# Patient Record
Sex: FEMALE | Race: WHITE | Employment: OTHER | URBAN - METROPOLITAN AREA
[De-identification: names, ages, dates, MRNs, and addresses within clinical notes are randomized per-mention and may not be internally consistent; named-entity substitution may affect disease eponyms.]

---

## 2017-02-17 ENCOUNTER — OFFICE VISIT (OUTPATIENT)
Dept: FAMILY MEDICINE CLINIC | Age: 82
End: 2017-02-17

## 2017-02-17 VITALS
WEIGHT: 123 LBS | HEART RATE: 80 BPM | BODY MASS INDEX: 22.63 KG/M2 | RESPIRATION RATE: 20 BRPM | DIASTOLIC BLOOD PRESSURE: 78 MMHG | TEMPERATURE: 98.2 F | OXYGEN SATURATION: 97 % | SYSTOLIC BLOOD PRESSURE: 148 MMHG | HEIGHT: 62 IN

## 2017-02-17 DIAGNOSIS — R31.9 URINARY TRACT INFECTION WITH HEMATURIA, SITE UNSPECIFIED: Primary | ICD-10-CM

## 2017-02-17 DIAGNOSIS — N39.0 URINARY TRACT INFECTION WITH HEMATURIA, SITE UNSPECIFIED: Primary | ICD-10-CM

## 2017-02-17 LAB
BILIRUB UR QL STRIP: NEGATIVE
GLUCOSE UR-MCNC: NEGATIVE MG/DL
KETONES P FAST UR STRIP-MCNC: NEGATIVE MG/DL
PH UR STRIP: 5.5 [PH] (ref 4.6–8)
PROT UR QL STRIP: NORMAL MG/DL
SP GR UR STRIP: 1.01 (ref 1–1.03)
UA UROBILINOGEN AMB POC: NORMAL (ref 0.2–1)
URINALYSIS CLARITY POC: CLEAR
URINALYSIS COLOR POC: YELLOW
URINE BLOOD POC: NORMAL
URINE LEUKOCYTES POC: NORMAL
URINE NITRITES POC: POSITIVE

## 2017-02-17 RX ORDER — NITROFURANTOIN 25; 75 MG/1; MG/1
100 CAPSULE ORAL 2 TIMES DAILY
Qty: 14 CAP | Refills: 0 | Status: SHIPPED | OUTPATIENT
Start: 2017-02-17 | End: 2017-02-24

## 2017-02-17 NOTE — PATIENT INSTRUCTIONS
Urinary Tract Infection in Women: Care Instructions  Your Care Instructions    A urinary tract infection, or UTI, is a general term for an infection anywhere between the kidneys and the urethra (where urine comes out). Most UTIs are bladder infections. They often cause pain or burning when you urinate. UTIs are caused by bacteria and can be cured with antibiotics. Be sure to complete your treatment so that the infection goes away. Follow-up care is a key part of your treatment and safety. Be sure to make and go to all appointments, and call your doctor if you are having problems. It's also a good idea to know your test results and keep a list of the medicines you take. How can you care for yourself at home? · Take your antibiotics as directed. Do not stop taking them just because you feel better. You need to take the full course of antibiotics. · Drink extra water and other fluids for the next day or two. This may help wash out the bacteria that are causing the infection. (If you have kidney, heart, or liver disease and have to limit fluids, talk with your doctor before you increase your fluid intake.)  · Avoid drinks that are carbonated or have caffeine. They can irritate the bladder. · Urinate often. Try to empty your bladder each time. · To relieve pain, take a hot bath or lay a heating pad set on low over your lower belly or genital area. Never go to sleep with a heating pad in place. To prevent UTIs  · Drink plenty of water each day. This helps you urinate often, which clears bacteria from your system. (If you have kidney, heart, or liver disease and have to limit fluids, talk with your doctor before you increase your fluid intake.)  · Consider adding cranberry juice to your diet. · Urinate when you need to. · Urinate right after you have sex. · Change sanitary pads often. · Avoid douches, bubble baths, feminine hygiene sprays, and other feminine hygiene products that have deodorants.   · After going to the bathroom, wipe from front to back. When should you call for help? Call your doctor now or seek immediate medical care if:  · Symptoms such as fever, chills, nausea, or vomiting get worse or appear for the first time. · You have new pain in your back just below your rib cage. This is called flank pain. · There is new blood or pus in your urine. · You have any problems with your antibiotic medicine. Watch closely for changes in your health, and be sure to contact your doctor if:  · You are not getting better after taking an antibiotic for 2 days. · Your symptoms go away but then come back. Where can you learn more? Go to http://yeny-bret.info/. Enter U386 in the search box to learn more about \"Urinary Tract Infection in Women: Care Instructions. \"  Current as of: August 12, 2016  Content Version: 11.1  © 5002-0541 [employer]. Care instructions adapted under license by [employer] (which disclaims liability or warranty for this information). If you have questions about a medical condition or this instruction, always ask your healthcare professional. [address] any warranty or liability for your use of this information.

## 2017-02-17 NOTE — MR AVS SNAPSHOT
Visit Information Date & Time Provider Department Dept. Phone Encounter #  
 2/17/2017  9:30 AM Christopher Pham NP [facility] [phone] [id number] Follow-up Instructions Return if symptoms worsen or fail to improve. Upcoming Health Maintenance Date Due  
 MEDICARE YEARLY EXAM 7/3/1989 GLAUCOMA SCREENING Q2Y 5/31/2017* Pneumococcal 65+ Low/Medium Risk (2 of 2 - PPSV23) 10/4/2017 DTaP/Tdap/Td series (2 - Td) 10/4/2026 *Topic was postponed. The date shown is not the original due date. Allergies as of 2/17/2017  Review Complete On: 2/17/2017 By: Sabas Chavez LPN No Known Allergies Current Immunizations  Reviewed on 10/4/2016 Name Date Influenza High Dose Vaccine PF 10/4/2016, 10/14/2015 Influenza Vaccine 9/12/2014, 9/12/2013 Influenza Vaccine Split 10/15/2011, 10/12/2010 Not reviewed this visit You Were Diagnosed With   
  
 Codes Comments Urinary tract infection with hematuria, site unspecified    -  Primary ICD-10-CM: N39.0, R31.9 ICD-9-CM: 599.0 Bladder infection     ICD-10-CM: N30.90 ICD-9-CM: 595.9 Vitals BP Pulse Temp Resp Height(growth percentile) Weight(growth percentile) 148/78 80 98.2 °F (36.8 °C) (Oral) 20 5' 2\" (1.575 m) 123 lb (55.8 kg) SpO2 BMI OB Status Smoking Status 97% 22.5 kg/m2 Hysterectomy Former Smoker Vitals History BMI and BSA Data Body Mass Index Body Surface Area  
 22.5 kg/m 2 1.56 m 2 Preferred Pharmacy Pharmacy Name Phone [id number] [place] [phone] Your Updated Medication List  
  
   
This list is accurate as of: 2/17/17 10:19 AM.  Always use your most recent med list. amLODIPine 10 mg tablet Commonly known as:  [place] Post TAKE ONE TABLET BY MOUTH ONCE DAILY  
  
 aspirin delayed-release 81 mg tablet Take  by mouth daily. CALCARB 600 WITH VITAMIN D tablet Generic drug:  calcium-cholecalciferol (D3) Take 1 Tab by mouth daily. FISH OIL 1,000 mg Cap Generic drug:  omega-3 fatty acids-vitamin e Take 1 Cap by mouth. AKLRGJTV-RKNB-PVNIHI-HYALUR AC PO Take  by mouth. hydroCHLOROthiazide 12.5 mg tablet Commonly known as:  HYDRODIURIL Take 1 Tab by mouth daily. lisinopril 20 mg tablet Commonly known as:  PRINIVIL, ZESTRIL  
TAKE ONE TABLET BY MOUTH ONCE DAILY  
  
 nitrofurantoin (macrocrystal-monohydrate) 100 mg capsule Commonly known as:  MACROBID Take 1 Cap by mouth two (2) times a day for 7 days. OCUVITE WITH LUTEIN tablet Generic drug:  vit a,c & e-lutein-minerals  
daily. polyethylene glycol 17 gram/dose powder Commonly known as:  [place] [employer] DILUTE ONE CAPFULL IN WATER AS DIRECTED DAILY  
  
 simvastatin 20 mg tablet Commonly known as:  ZOCOR  
TAKE ONE TABLET BY MOUTH IN THE EVENING  
  
 TYLENOL ARTHRITIS 650 mg CR tablet Generic drug:  acetaminophen Take 1,300 mg by mouth every twelve (12) hours as needed. Prescriptions Sent to Pharmacy Refills  
 nitrofurantoin, macrocrystal-monohydrate, (MACROBID) 100 mg capsule 0 Sig: Take 1 Cap by mouth two (2) times a day for 7 days. Class: Normal  
 Pharmacy: [address] #: [phone] Route: Oral  
  
We Performed the Following AMB POC URINALYSIS DIP STICK AUTO W/O MICRO [15307 CPT(R)] CULTURE, URINE [id number] CPT(R)] Follow-up Instructions Return if symptoms worsen or fail to improve. Patient Instructions Urinary Tract Infection in Women: Care Instructions Your Care Instructions A urinary tract infection, or UTI, is a general term for an infection anywhere between the kidneys and the urethra (where urine comes out). Most UTIs are bladder infections. They often cause pain or burning when you urinate. UTIs are caused by bacteria and can be cured with antibiotics. Be sure to complete your treatment so that the infection goes away. Follow-up care is a key part of your treatment and safety. Be sure to make and go to all appointments, and call your doctor if you are having problems. It's also a good idea to know your test results and keep a list of the medicines you take. How can you care for yourself at home? · Take your antibiotics as directed. Do not stop taking them just because you feel better. You need to take the full course of antibiotics. · Drink extra water and other fluids for the next day or two. This may help wash out the bacteria that are causing the infection. (If you have kidney, heart, or liver disease and have to limit fluids, talk with your doctor before you increase your fluid intake.) · Avoid drinks that are carbonated or have caffeine. They can irritate the bladder. · Urinate often. Try to empty your bladder each time. · To relieve pain, take a hot bath or lay a heating pad set on low over your lower belly or genital area. Never go to sleep with a heating pad in place. To prevent UTIs · Drink plenty of water each day. This helps you urinate often, which clears bacteria from your system. (If you have kidney, heart, or liver disease and have to limit fluids, talk with your doctor before you increase your fluid intake.) · Consider adding cranberry juice to your diet. · Urinate when you need to. · Urinate right after you have sex. · Change sanitary pads often. · Avoid douches, bubble baths, feminine hygiene sprays, and other feminine hygiene products that have deodorants. · After going to the bathroom, wipe from front to back. When should you call for help? Call your doctor now or seek immediate medical care if: · Symptoms such as fever, chills, nausea, or vomiting get worse or appear for the first time. · You have new pain in your back just below your rib cage.  This is called flank pain. · There is new blood or pus in your urine. · You have any problems with your antibiotic medicine. Watch closely for changes in your health, and be sure to contact your doctor if: 
· You are not getting better after taking an antibiotic for 2 days. · Your symptoms go away but then come back. Where can you learn more? Go to http://yeny-bret.info/. Enter O984 in the search box to learn more about \"Urinary Tract Infection in Women: Care Instructions. \" Current as of: August 12, 2016 Content Version: 11.1 © 2499-0255 [employer]. Care instructions adapted under license by [employer] (which disclaims liability or warranty for this information). If you have questions about a medical condition or this instruction, always ask your healthcare professional. [address] any warranty or liability for your use of this information. Introducing [facility]! Michelle Martinez introduces [employer] patient portal. Now you can access parts of your medical record, email your doctor's office, and request medication refills online. 1. In your internet browser, go to [email or web address] 2. Click on the First Time User? Click Here link in the Sign In box. You will see the New Member Sign Up page. 3. Enter your [employer] Access Code exactly as it appears below. You will not need to use this code after youve completed the sign-up process. If you do not sign up before the expiration date, you must request a new code. · [employer] Access Code: [email or web address] Expires: 5/18/2017 10:19 AM 
 
4. Enter the last four digits of your Social Security Number (xxxx) and Date of Birth (mm/dd/yyyy) as indicated and click Submit. You will be taken to the next sign-up page. 5. Create a [employer] ID. This will be your [employer] login ID and cannot be changed, so think of one that is secure and easy to remember. 6. Create a [facility] [employer] password. You can change your password at any time. 7. Enter your Password Reset Question and Answer. This can be used at a later time if you forget your password. 8. Enter your e-mail address. You will receive e-mail notification when new information is available in [address] 9. Click Sign Up. You can now view and download portions of your medical record. 10. Click the Download Summary menu link to download a portable copy of your medical information. If you have questions, please visit the Frequently Asked Questions section of the [employer] website. Remember, [employer] is NOT to be used for urgent needs. For medical emergencies, dial 911. Now available from your iPhone and Android! Please provide this summary of care documentation to your next provider. Your primary care clinician is listed as Emily Yang. If you have any questions after today's visit, please call [phone].

## 2017-02-17 NOTE — PROGRESS NOTES
HISTORY OF PRESENT ILLNESS  Philip Garcia is a 80 y.o. female. HPI Patient comes in today for UTI. States symptoms started last night. Complains of dysuria, frequency, urgency, abdominal pain. Denies flank pain, fever, chills. Drinks water, coffee. Wipes front to back. No bubble baths, no douching  No Known Allergies    Past Medical History   Diagnosis Date    Disc disease, degenerative, lumbar or lumbosacral     HTN (hypertension) 4/9/2010    Hypercholesteremia 4/9/2010    Osteoporosis, age related        Past Surgical History   Procedure Laterality Date    Pr abdomen surgery proc unlisted      Hx gyn      Hx cataract removal      Endoscopy, colon, diagnostic         Social History     Social History    Marital status:      Spouse name: N/A    Number of children: N/A    Years of education: N/A     Occupational History    Not on file. Social History Main Topics    Smoking status: Former Smoker     Quit date: 5/3/1986    Smokeless tobacco: Never Used    Alcohol use Yes      Comment: wine occas    Drug use: No    Sexual activity: Not Currently     Other Topics Concern    Not on file     Social History Narrative       Family History   Problem Relation Age of Onset    Heart Disease Mother     Cancer Father      colon    No Known Problems Sister     No Known Problems Brother        Current Outpatient Prescriptions   Medication Sig    simvastatin (ZOCOR) 20 mg tablet TAKE ONE TABLET BY MOUTH IN THE EVENING    hydrochlorothiazide (HYDRODIURIL) 12.5 mg tablet Take 1 Tab by mouth daily.  lisinopril (PRINIVIL, ZESTRIL) 20 mg tablet TAKE ONE TABLET BY MOUTH ONCE DAILY    amLODIPine (NORVASC) 10 mg tablet TAKE ONE TABLET BY MOUTH ONCE DAILY    polyethylene glycol (MIRALAX) 17 gram/dose powder DILUTE ONE CAPFULL IN WATER AS DIRECTED DAILY    acetaminophen (TYLENOL ARTHRITIS) 650 mg CR tablet Take 1,300 mg by mouth every twelve (12) hours as needed.     GLUC HCL/CSA/NGOZI HY/HYALUR AC (FZVFZGOS-NSXL-GKKTTE-HYALUR AC PO) Take  by mouth.  calcium-cholecalciferol, D3, (CALCARB 600 WITH VITAMIN D) tablet Take 1 Tab by mouth daily.  vit a,c & e-lutein-minerals (OCUVITE) tablet daily.  aspirin delayed-release 81 mg tablet Take  by mouth daily.  omega-3 fatty acids-vitamin e (FISH OIL) 1,000 mg Cap Take 1 Cap by mouth. No current facility-administered medications for this visit. Review of Systems   Constitutional: Negative for chills and fever. Gastrointestinal: Positive for abdominal pain. Negative for nausea and vomiting. Genitourinary: Positive for dysuria, frequency and urgency. Negative for flank pain and hematuria. Neurological: Negative for dizziness. Vitals:    02/17/17 0922 02/17/17 1016   BP: 189/89 148/78   Pulse: 80    Resp: 20    Temp: 98.2 °F (36.8 °C)    TempSrc: Oral    SpO2: 97%    Weight: 123 lb (55.8 kg)    Height: 5' 2\" (1.575 m)      Physical Exam   Constitutional: She is oriented to person, place, and time. Vital signs are normal. She appears well-developed and well-nourished. She is cooperative. Cardiovascular: Normal rate, regular rhythm and normal heart sounds. Pulmonary/Chest: Effort normal and breath sounds normal.   Abdominal: Soft. Normal appearance and bowel sounds are normal. There is tenderness in the suprapubic area. There is no CVA tenderness. Neurological: She is alert and oriented to person, place, and time. Skin: Skin is warm and dry. Psychiatric: She has a normal mood and affect. Her speech is normal and behavior is normal. Thought content normal.     ASSESSMENT and PLAN    ICD-10-CM ICD-9-CM    1. Urinary tract infection with hematuria, site unspecified N39.0 599.0 AMB POC URINALYSIS DIP STICK AUTO W/O MICRO    R31.9  nitrofurantoin, macrocrystal-monohydrate, (MACROBID) 100 mg capsule      CULTURE, URINE     Encounter Diagnoses   Name Primary?     Urinary tract infection with hematuria, site unspecified Yes Orders Placed This Encounter    CULTURE, URINE    AMB POC URINALYSIS DIP STICK AUTO W/O MICRO    nitrofurantoin, macrocrystal-monohydrate, (MACROBID) 100 mg capsule     Tommy was seen today for bladder infection. Diagnoses and all orders for this visit:    Urinary tract infection with hematuria, site unspecified  -     AMB POC URINALYSIS DIP STICK AUTO W/O MICRO - +nitrites, 3+ LE, 2+ blood  -     nitrofurantoin, macrocrystal-monohydrate, (MACROBID) 100 mg capsule; Take 1 Cap by mouth two (2) times a day for 7 days. -     CULTURE, URINE  -     Provided and reviewed written patient education on UTI and prevention of UTI  -     Push fluids, cranberry juice. Follow-up Disposition:  Return if symptoms worsen or fail to improve.  lab results and schedule of future lab studies reviewed with patient    I have reviewed the patient's allergies and made any necessary changes. Medical, procedural, social and family histories have been reviewed and updated as medically indicated. I have reconciled and/or revised patient medications in the EMR. I have discussed each diagnosis listed in this note with Drew Julien and/or their family. I have discussed treatment options and the risk/benefit analysis of those options, including safe use of medications and possible medication side effects. Through the use of shared decision making we have agreed to the above plan. The patient has received an after-visit summary and questions were answered concerning future plans. Olivia Brooks, AFSANEH    This note will not be viewable in [email or web address].

## 2017-02-20 LAB — BACTERIA UR CULT: ABNORMAL

## 2017-02-22 ENCOUNTER — TELEPHONE (OUTPATIENT)
Dept: FAMILY MEDICINE CLINIC | Age: 82
End: 2017-02-22

## 2017-02-25 ENCOUNTER — HOSPITAL ENCOUNTER (OUTPATIENT)
Dept: LAB | Age: 82
Discharge: HOME | End: 2017-02-25

## 2017-02-25 ENCOUNTER — HOSPITAL ENCOUNTER (EMERGENCY)
Age: 82
Discharge: HOME | End: 2017-02-25
Attending: EMERGENCY MEDICINE

## 2017-02-25 VITALS
RESPIRATION RATE: 16 BRPM | WEIGHT: 120 LBS | SYSTOLIC BLOOD PRESSURE: 191 MMHG | OXYGEN SATURATION: 98 % | BODY MASS INDEX: 21.26 KG/M2 | DIASTOLIC BLOOD PRESSURE: 86 MMHG | HEART RATE: 83 BPM | HEIGHT: 63 IN | TEMPERATURE: 97.7 F

## 2017-02-25 DIAGNOSIS — N30.00 ACUTE CYSTITIS WITHOUT HEMATURIA: Primary | ICD-10-CM

## 2017-02-25 LAB
BILIRUB UR QL: NEGATIVE
GLUCOSE UR QL STRIP.AUTO: NEGATIVE MG/DL
KETONES UR-MCNC: NEGATIVE MG/DL
LEUKOCYTE ESTERASE UR QL STRIP: ABNORMAL
NITRITE UR QL: NEGATIVE
PH UR: 8.5 [PH] (ref 5–8)
PROT UR QL: NEGATIVE MG/DL
RBC # UR STRIP: ABNORMAL /UL
SP GR UR: 1.01 (ref 1–1.03)
UROBILINOGEN UR QL: 0.2 EU/DL (ref 0.2–1)

## 2017-02-25 PROCEDURE — 87086 URINE CULTURE/COLONY COUNT: CPT | Performed by: EMERGENCY MEDICINE

## 2017-02-25 PROCEDURE — 87077 CULTURE AEROBIC IDENTIFY: CPT | Performed by: EMERGENCY MEDICINE

## 2017-02-25 PROCEDURE — 87186 SC STD MICRODIL/AGAR DIL: CPT | Performed by: EMERGENCY MEDICINE

## 2017-02-25 RX ORDER — CEPHALEXIN 500 MG/1
500 CAPSULE ORAL 4 TIMES DAILY
Qty: 28 CAP | Refills: 0 | Status: SHIPPED | OUTPATIENT
Start: 2017-02-25 | End: 2017-03-04

## 2017-02-25 NOTE — UC PROVIDER NOTE
HPI Comments: Just completed treatment for uti, susceptible to macrobid, symptoms recurred the day after completing treatment    Patient is a 80 y.o. female presenting with urinary pain. The history is provided by the patient. Urinary Pain    This is a new problem. The problem occurs every urination. The problem has not changed since onset. The quality of the pain is described as burning. The pain is at a severity of 3/10. The pain is mild. There has been no fever. She is not sexually active. There is no history of pyelonephritis. Associated symptoms include frequency, urgency and abdominal pain. Pertinent negatives include no chills, no sweats, no nausea, no vomiting, no discharge, no hematuria, no hesitancy, no flank pain, no vaginal discharge and no back pain. The patient is not pregnant. She has tried antibiotics for the symptoms. The treatment provided significant (then symptoms recurred after completing 7 days of macrobid) relief. Her past medical history is significant for recurrent UTIs. Her past medical history does not include kidney stones, single kidney, urological procedure, urinary stasis, catheterization or urinary catheter problem. Past Medical History:   Diagnosis Date    Disc disease, degenerative, lumbar or lumbosacral     HTN (hypertension) 4/9/2010    Hypercholesteremia 4/9/2010    Osteoporosis, age related         Past Surgical History:   Procedure Laterality Date    ABDOMEN SURGERY PROC UNLISTED      ENDOSCOPY, COLON, DIAGNOSTIC      HX CATARACT REMOVAL      HX GYN           Family History   Problem Relation Age of Onset    Heart Disease Mother     Cancer Father      colon    No Known Problems Sister     No Known Problems Brother         Social History     Social History    Marital status:      Spouse name: N/A    Number of children: N/A    Years of education: N/A     Occupational History    Not on file.      Social History Main Topics    Smoking status: Former Smoker     Quit date: 5/3/1986    Smokeless tobacco: Never Used    Alcohol use Yes      Comment: wine occas    Drug use: No    Sexual activity: Not Currently     Other Topics Concern    Not on file     Social History Narrative                ALLERGIES: Review of patient's allergies indicates no known allergies. Review of Systems   Constitutional: Negative. Negative for chills. Gastrointestinal: Positive for abdominal pain. Negative for nausea and vomiting. Genitourinary: Positive for dysuria, frequency and urgency. Negative for flank pain, hematuria, hesitancy and vaginal discharge. Musculoskeletal: Negative. Negative for back pain. All other systems reviewed and are negative. Vitals:    02/25/17 1101   BP: 191/86   Pulse: 83   Resp: 16   Temp: 97.7 °F (36.5 °C)   SpO2: 98%   Weight: 54.4 kg (120 lb)   Height: 5' 3\" (1.6 m)       Physical Exam   Constitutional: She is oriented to person, place, and time. She appears well-developed and well-nourished. HENT:   Head: Normocephalic and atraumatic. Mouth/Throat: Oropharynx is clear and moist. No oropharyngeal exudate. Eyes: Conjunctivae and EOM are normal. Pupils are equal, round, and reactive to light. Right eye exhibits no discharge. Left eye exhibits no discharge. No scleral icterus. Neck: Normal range of motion. No tracheal deviation present. No thyromegaly present. Cardiovascular: Normal rate, regular rhythm and normal heart sounds. No murmur heard. Pulmonary/Chest: Effort normal and breath sounds normal. No respiratory distress. She has no wheezes. She has no rales. She exhibits no tenderness. Abdominal: Soft. Bowel sounds are normal. She exhibits no distension. There is no tenderness. There is no rebound and no guarding. Musculoskeletal: Normal range of motion. She exhibits no edema or tenderness. Lymphadenopathy:     She has no cervical adenopathy. Neurological: She is alert and oriented to person, place, and time.  No cranial nerve deficit. Coordination normal.   Skin: Skin is warm. No erythema. Psychiatric: She has a normal mood and affect. Her behavior is normal. Judgment and thought content normal.   Nursing note and vitals reviewed.       MDM     Differential Diagnosis; Clinical Impression; Plan:     Recurrent uti, will send culture, treat with keflex      Procedures

## 2017-02-25 NOTE — DISCHARGE INSTRUCTIONS
Urinary Tract Infection in Women: Care Instructions  Your Care Instructions    A urinary tract infection, or UTI, is a general term for an infection anywhere between the kidneys and the urethra (where urine comes out). Most UTIs are bladder infections. They often cause pain or burning when you urinate. UTIs are caused by bacteria and can be cured with antibiotics. Be sure to complete your treatment so that the infection goes away. Follow-up care is a key part of your treatment and safety. Be sure to make and go to all appointments, and call your doctor if you are having problems. It's also a good idea to know your test results and keep a list of the medicines you take. How can you care for yourself at home? · Take your antibiotics as directed. Do not stop taking them just because you feel better. You need to take the full course of antibiotics. · Drink extra water and other fluids for the next day or two. This may help wash out the bacteria that are causing the infection. (If you have kidney, heart, or liver disease and have to limit fluids, talk with your doctor before you increase your fluid intake.)  · Avoid drinks that are carbonated or have caffeine. They can irritate the bladder. · Urinate often. Try to empty your bladder each time. · To relieve pain, take a hot bath or lay a heating pad set on low over your lower belly or genital area. Never go to sleep with a heating pad in place. To prevent UTIs  · Drink plenty of water each day. This helps you urinate often, which clears bacteria from your system. (If you have kidney, heart, or liver disease and have to limit fluids, talk with your doctor before you increase your fluid intake.)  · Consider adding cranberry juice to your diet. · Urinate when you need to. · Urinate right after you have sex. · Change sanitary pads often. · Avoid douches, bubble baths, feminine hygiene sprays, and other feminine hygiene products that have deodorants.   · After going to the bathroom, wipe from front to back. When should you call for help? Call your doctor now or seek immediate medical care if:  · Symptoms such as fever, chills, nausea, or vomiting get worse or appear for the first time. · You have new pain in your back just below your rib cage. This is called flank pain. · There is new blood or pus in your urine. · You have any problems with your antibiotic medicine. Watch closely for changes in your health, and be sure to contact your doctor if:  · You are not getting better after taking an antibiotic for 2 days. · Your symptoms go away but then come back. Where can you learn more? Go to http://yeny-bret.info/. Enter G443 in the search box to learn more about \"Urinary Tract Infection in Women: Care Instructions. \"  Current as of: August 12, 2016  Content Version: 11.1  © 3063-4850 [employer]. Care instructions adapted under license by [employer] (which disclaims liability or warranty for this information). If you have questions about a medical condition or this instruction, always ask your healthcare professional. [address] any warranty or liability for your use of this information.

## 2017-02-27 LAB
BACTERIA SPEC CULT: ABNORMAL
CC UR VC: ABNORMAL
SERVICE CMNT-IMP: ABNORMAL

## 2017-03-01 ENCOUNTER — OFFICE VISIT (OUTPATIENT)
Dept: FAMILY MEDICINE CLINIC | Age: 82
End: 2017-03-01

## 2017-03-01 VITALS
BODY MASS INDEX: 21.26 KG/M2 | SYSTOLIC BLOOD PRESSURE: 164 MMHG | HEART RATE: 80 BPM | DIASTOLIC BLOOD PRESSURE: 61 MMHG | TEMPERATURE: 96.2 F | WEIGHT: 120 LBS | OXYGEN SATURATION: 98 % | HEIGHT: 63 IN | RESPIRATION RATE: 20 BRPM

## 2017-03-01 DIAGNOSIS — N39.0 URINARY TRACT INFECTION, SITE UNSPECIFIED: Primary | ICD-10-CM

## 2017-03-01 LAB
BILIRUB UR QL STRIP: NEGATIVE
GLUCOSE UR-MCNC: NEGATIVE MG/DL
KETONES P FAST UR STRIP-MCNC: NEGATIVE MG/DL
PH UR STRIP: 7.5 [PH] (ref 4.6–8)
PROT UR QL STRIP: NEGATIVE MG/DL
SP GR UR STRIP: 1.01 (ref 1–1.03)
UA UROBILINOGEN AMB POC: NORMAL (ref 0.2–1)
URINALYSIS CLARITY POC: CLEAR
URINALYSIS COLOR POC: YELLOW
URINE BLOOD POC: NORMAL
URINE LEUKOCYTES POC: NORMAL
URINE NITRITES POC: NEGATIVE

## 2017-03-01 NOTE — PATIENT INSTRUCTIONS
Urinary Tract Infection in Women: Care Instructions  Your Care Instructions    A urinary tract infection, or UTI, is a general term for an infection anywhere between the kidneys and the urethra (where urine comes out). Most UTIs are bladder infections. They often cause pain or burning when you urinate. UTIs are caused by bacteria and can be cured with antibiotics. Be sure to complete your treatment so that the infection goes away. Follow-up care is a key part of your treatment and safety. Be sure to make and go to all appointments, and call your doctor if you are having problems. It's also a good idea to know your test results and keep a list of the medicines you take. How can you care for yourself at home? · Take your antibiotics as directed. Do not stop taking them just because you feel better. You need to take the full course of antibiotics. · Drink extra water and other fluids for the next day or two. This may help wash out the bacteria that are causing the infection. (If you have kidney, heart, or liver disease and have to limit fluids, talk with your doctor before you increase your fluid intake.)  · Avoid drinks that are carbonated or have caffeine. They can irritate the bladder. · Urinate often. Try to empty your bladder each time. · To relieve pain, take a hot bath or lay a heating pad set on low over your lower belly or genital area. Never go to sleep with a heating pad in place. To prevent UTIs  · Drink plenty of water each day. This helps you urinate often, which clears bacteria from your system. (If you have kidney, heart, or liver disease and have to limit fluids, talk with your doctor before you increase your fluid intake.)  · Consider adding cranberry juice to your diet. · Urinate when you need to. · Urinate right after you have sex. · Change sanitary pads often. · Avoid douches, bubble baths, feminine hygiene sprays, and other feminine hygiene products that have deodorants.   · After going to the bathroom, wipe from front to back. When should you call for help? Call your doctor now or seek immediate medical care if:  · Symptoms such as fever, chills, nausea, or vomiting get worse or appear for the first time. · You have new pain in your back just below your rib cage. This is called flank pain. · There is new blood or pus in your urine. · You have any problems with your antibiotic medicine. Watch closely for changes in your health, and be sure to contact your doctor if:  · You are not getting better after taking an antibiotic for 2 days. · Your symptoms go away but then come back. Where can you learn more? Go to http://yeny-bret.info/. Enter R924 in the search box to learn more about \"Urinary Tract Infection in Women: Care Instructions. \"  Current as of: August 12, 2016  Content Version: 11.1  © 7262-1898 [employer]. Care instructions adapted under license by [employer] (which disclaims liability or warranty for this information). If you have questions about a medical condition or this instruction, always ask your healthcare professional. [address] any warranty or liability for your use of this information.

## 2017-03-01 NOTE — PROGRESS NOTES
HISTORY OF PRESENT ILLNESS  Abrahan Bone is a 80 y.o. female. HPI  Patient comes in today for follow up UTI. Patient was seen by me in office on 2/17/17, dx with UTI, urine culture positive for E coli, C&C shows susceptibility to Macrobid that was prescribed for patient. Patient states symptoms improved while taking antibiotic, but returned (dysuria, back pain) on 2/25/17 once she had completed antibiotic. Patient went to [address] [place] [address] on 2/25/17 - records reviewed. Urine was recultured and patient was started on Keflex. Patient is currently in middle of treatment with Keflex and states symptoms have once again resolved. Denies abd pain, fever, chills, AMS  No Known Allergies    Past Medical History:   Diagnosis Date    Disc disease, degenerative, lumbar or lumbosacral     HTN (hypertension) 4/9/2010    Hypercholesteremia 4/9/2010    Osteoporosis, age related        Past Surgical History:   Procedure Laterality Date    ABDOMEN SURGERY PROC UNLISTED      ENDOSCOPY, COLON, DIAGNOSTIC      HX CATARACT REMOVAL      HX GYN         Social History     Social History    Marital status:      Spouse name: N/A    Number of children: N/A    Years of education: N/A     Occupational History    Not on file. Social History Main Topics    Smoking status: Former Smoker     Quit date: 5/3/1986    Smokeless tobacco: Never Used    Alcohol use Yes      Comment: wine occas    Drug use: No    Sexual activity: Not Currently     Other Topics Concern    Not on file     Social History Narrative       Family History   Problem Relation Age of Onset    Heart Disease Mother     Cancer Father      colon    No Known Problems Sister     No Known Problems Brother        Current Outpatient Prescriptions   Medication Sig    cephALEXin (KEFLEX) 500 mg capsule Take 1 Cap by mouth four (4) times daily for 7 days.     simvastatin (ZOCOR) 20 mg tablet TAKE ONE TABLET BY MOUTH IN THE EVENING    hydrochlorothiazide (HYDRODIURIL) 12.5 mg tablet Take 1 Tab by mouth daily.  lisinopril (PRINIVIL, ZESTRIL) 20 mg tablet TAKE ONE TABLET BY MOUTH ONCE DAILY    amLODIPine (NORVASC) 10 mg tablet TAKE ONE TABLET BY MOUTH ONCE DAILY    polyethylene glycol (MIRALAX) 17 gram/dose powder DILUTE ONE CAPFULL IN WATER AS DIRECTED DAILY    acetaminophen (TYLENOL ARTHRITIS) 650 mg CR tablet Take 1,300 mg by mouth every twelve (12) hours as needed.  GLUC HCL/CSA/NGOZI HY/HYALUR AC (FZILQBXG-UMKE-LWMNTS-HYALUR AC PO) Take  by mouth.  calcium-cholecalciferol, D3, (CALCARB 600 WITH VITAMIN D) tablet Take 1 Tab by mouth daily.  vit a,c & e-lutein-minerals (OCUVITE) tablet daily.  aspirin delayed-release 81 mg tablet Take  by mouth daily.  omega-3 fatty acids-vitamin e (FISH OIL) 1,000 mg Cap Take 1 Cap by mouth. No current facility-administered medications for this visit. Review of Systems   Constitutional: Negative for chills and fever. Respiratory: Negative for shortness of breath. Cardiovascular: Negative for chest pain. Gastrointestinal: Negative for abdominal pain, nausea and vomiting. Genitourinary: Negative for dysuria, flank pain, frequency, hematuria and urgency. Neurological: Negative for dizziness. Vitals:    03/01/17 1456   BP: 164/61   Pulse: 80   Resp: 20   Temp: 96.2 °F (35.7 °C)   TempSrc: Oral   SpO2: 98%   Weight: 120 lb (54.4 kg)   Height: 5' 3\" (1.6 m)     Physical Exam   Constitutional: She is oriented to person, place, and time. She appears well-developed and well-nourished. She is cooperative. Cardiovascular: Normal rate. Pulmonary/Chest: Effort normal.   Abdominal: Soft. There is no tenderness. There is no CVA tenderness. Neurological: She is alert and oriented to person, place, and time. Skin: Skin is warm and dry. Psychiatric: She has a normal mood and affect. Her behavior is normal. Judgment and thought content normal.   Vitals reviewed.     ASSESSMENT and PLAN    ICD-10-CM ICD-9-CM    1. Urinary tract infection, site unspecified N39.0  AMB POC URINALYSIS DIP STICK AUTO W/ MICRO     Encounter Diagnoses   Name Primary?  Urinary tract infection, site unspecified Yes     Orders Placed This Encounter    AMB POC URINALYSIS DIP STICK AUTO W/ MICRO     Tommy was seen today for bladder infection. Diagnoses and all orders for this visit:    Urinary tract infection, site unspecified - improving. Patient encouraged to continue Keflex to completion, drink plenty of water, wipe front to back. Patient instructed to call office if symptoms return after completion of antibiotic. If UTI persists, will need referral to urology. -     AMB POC URINALYSIS DIP STICK AUTO W/ MICRO      Follow-up Disposition:  Return if symptoms worsen or fail to improve.  lab results and schedule of future lab studies reviewed with patient    I have reviewed the patient's allergies and made any necessary changes. Medical, procedural, social and family histories have been reviewed and updated as medically indicated. I have reconciled and/or revised patient medications in the EMR. I have discussed each diagnosis listed in this note with Deni Deutsch and/or their family. I have discussed treatment options and the risk/benefit analysis of those options, including safe use of medications and possible medication side effects. Through the use of shared decision making we have agreed to the above plan. The patient has received an after-visit summary and questions were answered concerning future plans. Olivia Brooks, AFSANEH    This note will not be viewable in [employer].

## 2017-03-01 NOTE — MR AVS SNAPSHOT
Visit Information Date & Time Provider Department Dept. Phone Encounter #  
 3/1/2017  2:30 PM Christopher Pham NP [facility] [phone] [id number] Follow-up Instructions Return if symptoms worsen or fail to improve. Your Appointments 3/22/2017  2:45 PM  
Medicare Physical with Emeli Queen MD  
[facility] Appt Note: Tamir Heard  [address] [id number] [phone] [address] [id number] Upcoming Health Maintenance Date Due  
 MEDICARE YEARLY EXAM 7/3/1989 GLAUCOMA SCREENING Q2Y 5/31/2017* Pneumococcal 65+ Low/Medium Risk (2 of 2 - PPSV23) 10/4/2017 DTaP/Tdap/Td series (2 - Td) 10/4/2026 *Topic was postponed. The date shown is not the original due date. Allergies as of 3/1/2017  Review Complete On: 2/25/2017 By: Jori Meyer RN No Known Allergies Current Immunizations  Reviewed on 10/4/2016 Name Date Influenza High Dose Vaccine PF 10/4/2016, 10/14/2015 Influenza Vaccine 9/12/2014, 9/12/2013 Influenza Vaccine Split 10/15/2011, 10/12/2010 Not reviewed this visit You Were Diagnosed With   
  
 Codes Comments Urinary tract infection, site unspecified    -  Primary ICD-10-CM: N39.0 Vitals BP  
  
  
  
  
  
 164/61 BMI and BSA Data Body Mass Index Body Surface Area  
 21.26 kg/m 2 1.56 m 2 Preferred Pharmacy Pharmacy Name Phone [facility] [address] [address] [phone] Your Updated Medication List  
  
   
This list is accurate as of: 3/1/17  3:30 PM.  Always use your most recent med list. amLODIPine 10 mg tablet Commonly known as:  Rudene Post TAKE ONE TABLET BY MOUTH ONCE DAILY  
  
 aspirin delayed-release 81 mg tablet Take  by mouth daily. CALCARB 600 WITH VITAMIN D tablet Generic drug:  calcium-cholecalciferol (D3) Take 1 Tab by mouth daily. cephALEXin 500 mg capsule Commonly known as:  Paris Revels Take 1 Cap by mouth four (4) times daily for 7 days. FISH OIL 1,000 mg Cap Generic drug:  omega-3 fatty acids-vitamin e Take 1 Cap by mouth. CZELUWXZ-GCLC-APYRQI-HYALUR AC PO Take  by mouth. hydroCHLOROthiazide 12.5 mg tablet Commonly known as:  HYDRODIURIL Take 1 Tab by mouth daily. lisinopril 20 mg tablet Commonly known as:  PRINIVIL, ZESTRIL  
TAKE ONE TABLET BY MOUTH ONCE DAILY  
  
 OCUVITE WITH LUTEIN tablet Generic drug:  vit a,c & e-lutein-minerals  
daily. polyethylene glycol 17 gram/dose powder Commonly known as:  Emmie Manan DILUTE ONE CAPFULL IN WATER AS DIRECTED DAILY  
  
 simvastatin 20 mg tablet Commonly known as:  ZOCOR  
TAKE ONE TABLET BY MOUTH IN THE EVENING  
  
 TYLENOL ARTHRITIS 650 mg CR tablet Generic drug:  acetaminophen Take 1,300 mg by mouth every twelve (12) hours as needed. We Performed the Following AMB POC URINALYSIS DIP STICK AUTO W/ MICRO [71048 CPT(R)] Follow-up Instructions Return if symptoms worsen or fail to improve. Patient Instructions Urinary Tract Infection in Women: Care Instructions Your Care Instructions A urinary tract infection, or UTI, is a general term for an infection anywhere between the kidneys and the urethra (where urine comes out). Most UTIs are bladder infections. They often cause pain or burning when you urinate. UTIs are caused by bacteria and can be cured with antibiotics. Be sure to complete your treatment so that the infection goes away. Follow-up care is a key part of your treatment and safety. Be sure to make and go to all appointments, and call your doctor if you are having problems. It's also a good idea to know your test results and keep a list of the medicines you take. How can you care for yourself at home? · Take your antibiotics as directed. Do not stop taking them just because you feel better. You need to take the full course of antibiotics. · Drink extra water and other fluids for the next day or two. This may help wash out the bacteria that are causing the infection. (If you have kidney, heart, or liver disease and have to limit fluids, talk with your doctor before you increase your fluid intake.) · Avoid drinks that are carbonated or have caffeine. They can irritate the bladder. · Urinate often. Try to empty your bladder each time. · To relieve pain, take a hot bath or lay a heating pad set on low over your lower belly or genital area. Never go to sleep with a heating pad in place. To prevent UTIs · Drink plenty of water each day. This helps you urinate often, which clears bacteria from your system. (If you have kidney, heart, or liver disease and have to limit fluids, talk with your doctor before you increase your fluid intake.) · Consider adding cranberry juice to your diet. · Urinate when you need to. · Urinate right after you have sex. · Change sanitary pads often. · Avoid douches, bubble baths, feminine hygiene sprays, and other feminine hygiene products that have deodorants. · After going to the bathroom, wipe from front to back. When should you call for help? Call your doctor now or seek immediate medical care if: · Symptoms such as fever, chills, nausea, or vomiting get worse or appear for the first time. · You have new pain in your back just below your rib cage. This is called flank pain. · There is new blood or pus in your urine. · You have any problems with your antibiotic medicine. Watch closely for changes in your health, and be sure to contact your doctor if: 
· You are not getting better after taking an antibiotic for 2 days. · Your symptoms go away but then come back. Where can you learn more? Go to http://yeny-bret.info/. Enter F446 in the search box to learn more about \"Urinary Tract Infection in Women: Care Instructions. \" Current as of: August 12, 2016 Content Version: 11.1 © 1716-7082 [employer]. Care instructions adapted under license by [employer] (which disclaims liability or warranty for this information). If you have questions about a medical condition or this instruction, always ask your healthcare professional. [address] any warranty or liability for your use of this information. Introducing [facility]! Nathanielsoumya Diamond introduces [employer] patient portal. Now you can access parts of your medical record, email your doctor's office, and request medication refills online. 1. In your internet browser, go to [email or web address] 2. Click on the First Time User? Click Here link in the Sign In box. You will see the New Member Sign Up page. 3. Enter your ChoreMonster Access Code exactly as it appears below. You will not need to use this code after youve completed the sign-up process. If you do not sign up before the expiration date, you must request a new code. · ChoreMonster Access Code: [email or web address] Expires: 5/18/2017 10:19 AM 
 
4. Enter the last four digits of your Social Security Number (xxxx) and Date of Birth (mm/dd/yyyy) as indicated and click Submit. You will be taken to the next sign-up page. 5. Create a ChoreMonster ID. This will be your ChoreMonster login ID and cannot be changed, so think of one that is secure and easy to remember. 6. Create a Ch[employer]Monster password. You can change your password at any time. 7. Enter your Password Reset Question and Answer. This can be used at a later time if you forget your password. 8. Enter your e-mail address. You will receive e-mail notification when new information is available in [address] 9. Click Sign Up. You can now view and download portions of your medical record. 10. Click the Download Summary menu link to download a portable copy of your medical information. If you have questions, please visit the Frequently Asked Questions section of the [employer] website. Remember, [employer] is NOT to be used for urgent needs. For medical emergencies, dial 911. Now available from your iPhone and Android! Please provide this summary of care documentation to your next provider. Your primary care clinician is listed as Shravan Wilkerson. If you have any questions after today's visit, please call [phone].

## 2017-06-29 ENCOUNTER — OFFICE VISIT (OUTPATIENT)
Dept: FAMILY MEDICINE CLINIC | Age: 82
End: 2017-06-29

## 2017-06-29 ENCOUNTER — PATIENT OUTREACH (OUTPATIENT)
Dept: FAMILY MEDICINE CLINIC | Age: 82
End: 2017-06-29

## 2017-06-29 VITALS
SYSTOLIC BLOOD PRESSURE: 148 MMHG | DIASTOLIC BLOOD PRESSURE: 70 MMHG | TEMPERATURE: 96.5 F | HEIGHT: 63 IN | BODY MASS INDEX: 21.09 KG/M2 | RESPIRATION RATE: 18 BRPM | HEART RATE: 72 BPM | OXYGEN SATURATION: 95 % | WEIGHT: 119 LBS

## 2017-06-29 DIAGNOSIS — Z00.00 ROUTINE GENERAL MEDICAL EXAMINATION AT A HEALTH CARE FACILITY: ICD-10-CM

## 2017-06-29 DIAGNOSIS — R82.90 ABNORMAL FINDING IN URINE: ICD-10-CM

## 2017-06-29 DIAGNOSIS — I10 ESSENTIAL HYPERTENSION: Primary | ICD-10-CM

## 2017-06-29 DIAGNOSIS — E78.5 HYPERLIPIDEMIA, UNSPECIFIED HYPERLIPIDEMIA TYPE: ICD-10-CM

## 2017-06-29 DIAGNOSIS — Z13.39 SCREENING FOR ALCOHOLISM: ICD-10-CM

## 2017-06-29 LAB
BILIRUB UR QL STRIP: NEGATIVE
GLUCOSE UR-MCNC: NEGATIVE MG/DL
KETONES P FAST UR STRIP-MCNC: NEGATIVE MG/DL
PH UR STRIP: 7 [PH] (ref 4.6–8)
PROT UR QL STRIP: NEGATIVE MG/DL
SP GR UR STRIP: 1.01 (ref 1–1.03)
UA UROBILINOGEN AMB POC: NORMAL (ref 0.2–1)
URINALYSIS CLARITY POC: NORMAL
URINALYSIS COLOR POC: YELLOW
URINE BLOOD POC: NORMAL
URINE LEUKOCYTES POC: NORMAL
URINE NITRITES POC: NEGATIVE

## 2017-06-29 RX ORDER — HYDROCHLOROTHIAZIDE 12.5 MG/1
TABLET ORAL
Qty: 90 TAB | Refills: 3 | Status: SHIPPED | OUTPATIENT
Start: 2017-06-29 | End: 2018-06-08 | Stop reason: SDUPTHER

## 2017-06-29 RX ORDER — AMLODIPINE BESYLATE 10 MG/1
TABLET ORAL
Qty: 90 TAB | Refills: 3 | Status: SHIPPED | OUTPATIENT
Start: 2017-06-29 | End: 2018-06-08 | Stop reason: SDUPTHER

## 2017-06-29 RX ORDER — SIMVASTATIN 20 MG/1
TABLET, FILM COATED ORAL
Qty: 90 TAB | Refills: 3 | Status: SHIPPED | OUTPATIENT
Start: 2017-06-29 | End: 2018-06-08 | Stop reason: SDUPTHER

## 2017-06-29 RX ORDER — LISINOPRIL 20 MG/1
TABLET ORAL
Qty: 90 TAB | Refills: 3 | Status: SHIPPED | OUTPATIENT
Start: 2017-06-29 | End: 2018-06-08 | Stop reason: SDUPTHER

## 2017-06-29 NOTE — MR AVS SNAPSHOT
Visit Information Date & Time Provider Department Dept. Phone Encounter #  
 6/29/2017  9:00 AM Greg Dahl [phone] [id number] Follow-up Instructions Return in about 6 months (around 12/29/2017). Upcoming Health Maintenance Date Due  
 GLAUCOMA SCREENING Q2Y 7/3/1989 MEDICARE YEARLY EXAM 7/3/1989 INFLUENZA AGE 9 TO ADULT 8/1/2017 DTaP/Tdap/Td series (2 - Td) 6/29/2027 Allergies as of 6/29/2017  Review Complete On: 6/29/2017 By: Odessa Merino LPN No Known Allergies Current Immunizations  Reviewed on 10/4/2016 Name Date Influenza High Dose Vaccine PF 10/4/2016, 10/14/2015 Influenza Vaccine 9/12/2014, 9/12/2013 Influenza Vaccine Split 10/15/2011, 10/12/2010 Not reviewed this visit You Were Diagnosed With   
  
 Codes Comments Hyperlipidemia, unspecified hyperlipidemia type    -  Primary ICD-10-CM: E78.5 ICD-9-CM: 272.4 Essential hypertension     ICD-10-CM: I10 
ICD-9-CM: 401.9 Routine general medical examination at a health care facility     ICD-10-CM: Z00.00 ICD-9-CM: V70.0 Screening for alcoholism     ICD-10-CM: Z13.89 ICD-9-CM: V79.1 Vitals Height(growth percentile) Weight(growth percentile) BMI OB Status Smoking Status 5' 3\" (1.6 m) 119 lb (54 kg) 21.08 kg/m2 Hysterectomy Former Smoker Vitals History BMI and BSA Data Body Mass Index Body Surface Area 21.08 kg/m 2 1.55 m 2 Preferred Pharmacy Pharmacy Name Phone [facility] [id number] [place] [phone] Your Updated Medication List  
  
   
This list is accurate as of: 6/29/17 10:06 AM.  Always use your most recent med list. amLODIPine 10 mg tablet Commonly known as:  [place] Staggers TAKE ONE TABLET BY MOUTH ONCE DAILY  
  
 aspirin delayed-release 81 mg tablet Take  by mouth daily. CALCARB 600 WITH VITAMIN D tablet Generic drug:  calcium-cholecalciferol (D3) Take 1 Tab by mouth daily. FISH OIL 1,000 mg Cap Generic drug:  omega-3 fatty acids-vitamin e Take 1 Cap by mouth. RCKRMKFC-TIEM-NZYIAU-HYALUR AC PO Take  by mouth. hydroCHLOROthiazide 12.5 mg tablet Commonly known as:  HYDRODIURIL  
TAKE ONE TABLET BY MOUTH ONCE DAILY  
  
 lisinopril 20 mg tablet Commonly known as:  PRINIVIL, ZESTRIL  
TAKE ONE TABLET BY MOUTH ONCE DAILY  
  
 OCUVITE WITH LUTEIN tablet Generic drug:  vit a,c & e-lutein-minerals  
daily. polyethylene glycol 17 gram/dose powder Commonly known as:  Gwenyth Space DILUTE ONE CAPFULL IN WATER AS DIRECTED DAILY  
  
 simvastatin 20 mg tablet Commonly known as:  ZOCOR  
TAKE ONE TABLET BY MOUTH IN THE EVENING  
  
 TYLENOL ARTHRITIS 650 mg CR tablet Generic drug:  acetaminophen Take 1,300 mg by mouth every twelve (12) hours as needed. We Performed the Following AMB POC URINALYSIS DIP STICK AUTO W/ MICRO [34454 CPT(R)] CBC WITH AUTOMATED DIFF [70281 CPT(R)] LIPID PANEL [81837 CPT(R)] METABOLIC PANEL, COMPREHENSIVE [26109 CPT(R)] Follow-up Instructions Return in about 6 months (around 12/29/2017). Patient Instructions Medicare Part B Preventive Services Limitations Recommendation Scheduled Bone Mass Measurement 
(age 72 & older, biennial) Requires diagnosis related to osteoporosis or estrogen deficiency. Biennial benefit unless patient has history of long-term glucocorticoid tx or baseline is needed because initial test was by other method  Declined today Cardiovascular Screening Blood Tests (every 5 years) Total cholesterol, HDL, Triglycerides Order as a panel if possible  Last done 10/2017 Colorectal Cancer Screening 
-Fecal occult blood test (annual) -Flexible sigmoidoscopy (5y) 
-Screening colonoscopy (10y) -Barium Enema   Had in past  
Counseling to Prevent Tobacco Use (up to 8 sessions per year) - Counseling greater than 3 and up to 10 minutes - Counseling greater than 10 minutes Patients must be asymptomatic of tobacco-related conditions to receive as preventive service  Non smoker Diabetes Screening Tests (at least every 3 years, Medicare covers annually or at 6-month intervals for prediabetic patients) Fasting blood sugar (FBS) or glucose tolerance test (GTT) Patient must be diagnosed with one of the following: 
-Hypertension, Dyslipidemia, obesity, previous impaired FBS or GTT 
Or any two of the following: overweight, FH of diabetes, age ? 72, history of gestational diabetes, birth of baby weighing more than 9 pounds  Last done 10/2016 blood glucose was 100 normal is  Diabetes Self-Management Training (DSMT) (no USPSTF recommendation) Requires referral by treating physician for patient with diabetes or renal disease. 10 hours of initial DSMT session of no less than 30 minutes each in a continuous 12-month period. 2 hours of follow-up DSMT in subsequent years. N/A Glaucoma Screening (no USPSTF recommendation) Diabetes mellitus, family history, , age 48 or over,  American, age 72 or over  Dr Mushtaq Johnson yearly Human Immunodeficiency Virus (HIV) Screening (annually for increased risk patients) HIV-1 and HIV-2 by EIA, FADIA, rapid antibody test, or oral mucosa transudate Patient must be at increased risk for HIV infection per USPSTF guidelines or pregnant. Tests covered annually for patients at increased risk. Pregnant patients may receive up to 3 test during pregnancy. Not high risk Medical Nutrition Therapy (MNT) (for diabetes or renal disease not recommended schedule) Requires referral by treating physician for patient with diabetes or renal disease. Can be provided in same year as diabetes self-management training (DSMT), and CMS recommends medical nutrition therapy take place after DSMT.   Up to 3 hours for initial year and 2 hours in subsequent years. N/A Shingles Vaccination A shingles vaccine is also recommended once in a lifetime after age 61  Declined today Seasonal Influenza Vaccination (annually)   Fall 2017 Pneumococcal Vaccination (once after 65)   Declined today Hepatitis B Vaccinations (if medium/high risk) Medium/high risk factors:  End-stage renal disease, Hemophiliacs who received Factor VIII or IX concentrates, Clients of institutions for the mentally retarded, Persons who live in the same house as a HepB virus carrier, Homosexual men, Illicit injectable drug abusers. N/A Screening Mammography (biennial age 54-69) Annually (age 36 or over)  Declined today Screening Pap Tests and Pelvic Examination (up to age 79 and after 79 if unknown history or abnormal study last 10 years) Every 24 months except high risk  Hysterectomy Ultrasound Screening for Abdominal Aortic Aneurysm (AAA) (once) Patient must be referred through IPPE and not have had a screening for abdominal aortic aneurysm before under Medicare. Limited to patients who meet one of the following criteria: 
- Men who are 73-68 years old and have smoked more than 100 cigarettes in their lifetime. 
-Anyone with a FH of AAA 
-Anyone recommended for screening by USPSTF  N/A Introducing [facility]! [employer] introduces [employer] patient portal. Now you can access parts of your medical record, email your doctor's office, and request medication refills online. 1. In your internet browser, go to [email or web address] 2. Click on the First Time User? Click Here link in the Sign In box. You will see the New Member Sign Up page. 3. Enter your [employer] Access Code exactly as it appears below. You will not need to use this code after youve completed the sign-up process. If you do not sign up before the expiration date, you must request a new code. · [employer] Access Code: [email or web address] Expires: 9/27/2017  9:39 AM 
 
 4. Enter the last four digits of your Social Security Number (xxxx) and Date of Birth (mm/dd/yyyy) as indicated and click Submit. You will be taken to the next sign-up page. 5. Create a [employer] ID. This will be your [employer] login ID and cannot be changed, so think of one that is secure and easy to remember. 6. Create a [employer] password. You can change your password at any time. 7. Enter your Password Reset Question and Answer. This can be used at a later time if you forget your password. 8. Enter your e-mail address. You will receive e-mail notification when new information is available in [address] 9. Click Sign Up. You can now view and download portions of your medical record. 10. Click the Download Summary menu link to download a portable copy of your medical information. If you have questions, please visit the Frequently Asked Questions section of the [employer] website. Remember, [employer] is NOT to be used for urgent needs. For medical emergencies, dial 911. Now available from your iPhone and Android! Please provide this summary of care documentation to your next provider. Your primary care clinician is listed as [facility]. If you have any questions after today's visit, please call [phone].

## 2017-06-29 NOTE — PROGRESS NOTES
HISTORY OF PRESENT ILLNESS  Floyd Whipple is a 80 y.o. female. HPI  Patient comes in today for follow up. Patient states compliance with BP medications. Denies any problems with medications. Patient states she took medications this morning. Denies chest pains, palpitations, dyspnea, dizziness, tingling in extremities, headache. Appetite good. Urinating without difficulty. Patient's daughter present today, states patient has been drinking cranberry juice and taking Azo, would like to have urine checked for UTI. Patient has been adamant about not seeing a urologist.  Denies fever, chills, abdominal pain. No Known Allergies    Past Medical History:   Diagnosis Date    Disc disease, degenerative, lumbar or lumbosacral     HTN (hypertension) 4/9/2010    Hypercholesteremia 4/9/2010    Osteoporosis, age related        Past Surgical History:   Procedure Laterality Date    ABDOMEN SURGERY PROC UNLISTED      ENDOSCOPY, COLON, DIAGNOSTIC      HX CATARACT REMOVAL      HX GYN         Social History     Social History    Marital status:      Spouse name: N/A    Number of children: N/A    Years of education: N/A     Occupational History    Not on file.      Social History Main Topics    Smoking status: Former Smoker     Quit date: 5/3/1986    Smokeless tobacco: Never Used    Alcohol use Yes      Comment: wine occas    Drug use: No    Sexual activity: Not Currently     Other Topics Concern    Not on file     Social History Narrative       Family History   Problem Relation Age of Onset    Heart Disease Mother     Cancer Father      colon    No Known Problems Sister     No Known Problems Brother        Current Outpatient Prescriptions   Medication Sig    lisinopril (PRINIVIL, ZESTRIL) 20 mg tablet TAKE ONE TABLET BY MOUTH ONCE DAILY    amLODIPine (NORVASC) 10 mg tablet TAKE ONE TABLET BY MOUTH ONCE DAILY    hydroCHLOROthiazide (HYDRODIURIL) 12.5 mg tablet TAKE ONE TABLET BY MOUTH ONCE DAILY    simvastatin (ZOCOR) 20 mg tablet TAKE ONE TABLET BY MOUTH IN THE EVENING    acetaminophen (TYLENOL ARTHRITIS) 650 mg CR tablet Take 1,300 mg by mouth every twelve (12) hours as needed.  GLUC HCL/CSA/NGOZI HY/HYALUR AC (TEJCGCHT-NSPY-CYYIVP-HYALUR AC PO) Take  by mouth.  calcium-cholecalciferol, D3, (CALCARB 600 WITH VITAMIN D) tablet Take 1 Tab by mouth daily.  vit a,c & e-lutein-minerals (OCUVITE) tablet daily.  aspirin delayed-release 81 mg tablet Take  by mouth daily.  omega-3 fatty acids-vitamin e (FISH OIL) 1,000 mg Cap Take 1 Cap by mouth.  polyethylene glycol (MIRALAX) 17 gram/dose powder DILUTE ONE CAPFULL IN WATER AS DIRECTED DAILY     No current facility-administered medications for this visit. Review of Systems   Constitutional: Negative for chills, fever, malaise/fatigue and weight loss. Respiratory: Negative for cough and shortness of breath. Cardiovascular: Negative for chest pain, palpitations and leg swelling. Gastrointestinal: Negative for abdominal pain, nausea and vomiting. Genitourinary: Negative for dysuria, flank pain, frequency, hematuria and urgency. Musculoskeletal: Negative for back pain, joint pain and myalgias. Skin: Negative. Neurological: Negative for dizziness, tingling, sensory change, speech change, focal weakness and headaches. Psychiatric/Behavioral: Negative for depression. The patient is not nervous/anxious and does not have insomnia. Vitals:    06/29/17 0914 06/29/17 1000   BP: 176/77 148/70   Pulse: 72    Resp: 18    Temp: 96.5 °F (35.8 °C)    TempSrc: Oral    SpO2: 95%    Weight: 119 lb (54 kg)    Height: 5' 3\" (1.6 m)      Physical Exam   Constitutional: She is oriented to person, place, and time. She appears well-developed and well-nourished. She is cooperative. Cardiovascular: Normal rate, regular rhythm and normal heart sounds. Pulses:       Radial pulses are 2+ on the right side, and 2+ on the left side. Dorsalis pedis pulses are 2+ on the right side, and 2+ on the left side. Pulmonary/Chest: Effort normal.   Abdominal: Soft. Normal appearance and bowel sounds are normal. There is no tenderness. Neurological: She is alert and oriented to person, place, and time. Skin: Skin is warm and dry. Psychiatric: She has a normal mood and affect. Her behavior is normal. Judgment and thought content normal.   Vitals reviewed. ASSESSMENT and PLAN    ICD-10-CM ICD-9-CM    1. Essential hypertension I10 401.9 hydroCHLOROthiazide (HYDRODIURIL) 12.5 mg tablet      amLODIPine (NORVASC) 10 mg tablet      lisinopril (PRINIVIL, ZESTRIL) 20 mg tablet      METABOLIC PANEL, COMPREHENSIVE      CBC WITH AUTOMATED DIFF      LIPID PANEL      AMB POC URINALYSIS DIP STICK AUTO W/ MICRO   2. Hyperlipidemia, unspecified hyperlipidemia type E78.5 272.4 simvastatin (ZOCOR) 20 mg tablet   3. Routine general medical examination at a health care facility Z00.00 V70.0    4. Screening for alcoholism Z13.89 V79.1    5. Abnormal finding in urine R82.90 791.9 CULTURE, URINE     Encounter Diagnoses   Name Primary?  Essential hypertension Yes    Hyperlipidemia, unspecified hyperlipidemia type     Routine general medical examination at a health care facility     Screening for alcoholism     Abnormal finding in urine      Orders Placed This Encounter    CULTURE, URINE    METABOLIC PANEL, COMPREHENSIVE    CBC WITH AUTOMATED DIFF    LIPID PANEL    AMB POC URINALYSIS DIP STICK AUTO W/ MICRO    hydroCHLOROthiazide (HYDRODIURIL) 12.5 mg tablet    amLODIPine (NORVASC) 10 mg tablet    lisinopril (PRINIVIL, ZESTRIL) 20 mg tablet    simvastatin (ZOCOR) 20 mg tablet     Tommy was seen today for physical, medication refill, labs and annual wellness visit. Diagnoses and all orders for this visit:    Essential hypertension - stable.   Continue current medications and check labs  -     hydroCHLOROthiazide (HYDRODIURIL) 12.5 mg tablet; TAKE ONE TABLET BY MOUTH ONCE DAILY  -     amLODIPine (NORVASC) 10 mg tablet; TAKE ONE TABLET BY MOUTH ONCE DAILY  -     lisinopril (PRINIVIL, ZESTRIL) 20 mg tablet; TAKE ONE TABLET BY MOUTH ONCE DAILY  -     METABOLIC PANEL, COMPREHENSIVE  -     CBC WITH AUTOMATED DIFF  -     LIPID PANEL  -     AMB POC URINALYSIS DIP STICK AUTO W/ MICRO    Hyperlipidemia, unspecified hyperlipidemia type  -     simvastatin (ZOCOR) 20 mg tablet; TAKE ONE TABLET BY MOUTH IN THE EVENING    Routine general medical examination at a health care facility - per nurse navigator. Screening for alcoholism    Abnormal finding in urine  -     CULTURE, URINE      Follow-up Disposition:  Return in about 6 months (around 12/29/2017). lab results and schedule of future lab studies reviewed with patient  reviewed diet, exercise and weight control  cardiovascular risk and specific lipid/LDL goals reviewed    I have reviewed the patient's allergies and made any necessary changes. Medical, procedural, social and family histories have been reviewed and updated as medically indicated. I have reconciled and/or revised patient medications in the EMR. I have discussed each diagnosis listed in this note with Tamiko Larkin and/or their family. I have discussed treatment options and the risk/benefit analysis of those options, including safe use of medications and possible medication side effects. Through the use of shared decision making we have agreed to the above plan. The patient has received an after-visit summary and questions were answered concerning future plans. AFSANEH Solis    This note will not be viewable in [facility].

## 2017-06-29 NOTE — PROGRESS NOTES
This is an Initial Medicare Annual Wellness Exam (AWV) (Performed 12 months after IPPE or effective date of Medicare Part B enrollment, Once in a lifetime)    I have reviewed the patient's medical history in detail and updated the computerized patient record. History     Past Medical History:   Diagnosis Date    Disc disease, degenerative, lumbar or lumbosacral     HTN (hypertension) 4/9/2010    Hypercholesteremia 4/9/2010    Osteoporosis, age related       Past Surgical History:   Procedure Laterality Date    ABDOMEN SURGERY PROC UNLISTED      ENDOSCOPY, COLON, DIAGNOSTIC      HX CATARACT REMOVAL      HX GYN       Current Outpatient Prescriptions   Medication Sig Dispense Refill    lisinopril (PRINIVIL, ZESTRIL) 20 mg tablet TAKE ONE TABLET BY MOUTH ONCE DAILY 90 Tab 0    amLODIPine (NORVASC) 10 mg tablet TAKE ONE TABLET BY MOUTH ONCE DAILY 90 Tab 0    hydroCHLOROthiazide (HYDRODIURIL) 12.5 mg tablet TAKE ONE TABLET BY MOUTH ONCE DAILY 90 Tab 0    simvastatin (ZOCOR) 20 mg tablet TAKE ONE TABLET BY MOUTH IN THE EVENING 90 Tab 1    acetaminophen (TYLENOL ARTHRITIS) 650 mg CR tablet Take 1,300 mg by mouth every twelve (12) hours as needed.  GLUC HCL/CSA/NGOZI HY/HYALUR AC (WGQHDKLZ-NOBE-SMMLIP-HYALUR AC PO) Take  by mouth.  calcium-cholecalciferol, D3, (CALCARB 600 WITH VITAMIN D) tablet Take 1 Tab by mouth daily.  vit a,c & e-lutein-minerals (OCUVITE) tablet daily.  aspirin delayed-release 81 mg tablet Take  by mouth daily.  omega-3 fatty acids-vitamin e (FISH OIL) 1,000 mg Cap Take 1 Cap by mouth.       polyethylene glycol (MIRALAX) 17 gram/dose powder DILUTE ONE CAPFULL IN WATER AS DIRECTED DAILY 765 g 1     No Known Allergies  Family History   Problem Relation Age of Onset    Heart Disease Mother     Cancer Father      colon    No Known Problems Sister     No Known Problems Brother      Social History   Substance Use Topics    Smoking status: Former Smoker     Quit date: 5/3/1986    Smokeless tobacco: Never Used    Alcohol use Yes      Comment: wine occas     Patient Active Problem List   Diagnosis Code    Hypercholesteremia E78.00    Encounter for long-term (current) use of other medications Z79.899    History of appendectomy Z90.49    Essential hypertension I10    Chronic back pain M54.9, G89.29    Macular degeneration H35.30    At risk for osteoporosis/osteopenia Z91.89    Arthritis of both knees M17.0         Depression Risk Factor Screening:     PHQ over the last two weeks 2/17/2017   Little interest or pleasure in doing things Not at all   Feeling down, depressed or hopeless Not at all   Total Score PHQ 2 0     Alcohol Risk Factor Screening: On any occasion during the past 3 months, have you had more than 3 drinks containing alcohol? No    Do you average more than 7 drinks per week? No      Functional Ability and Level of Safety:     Hearing Loss   mild    Activities of Daily Living   Partial assistance. Requires assistance with: ambulation and no ADLs    Fall Risk   Fall Risk Assessment, last 12 mths 6/29/2017   Able to walk? Yes   Fall in past 12 months? No     Abuse Screen   Patient is not abused    Review of Systems   Not required    Physical Examination     No exam data present    Evaluation of Cognitive Function:  Mood/affect:  happy  Appearance: age appropriate and well dressed  Family member/caregiver input: daughterLeota Bowels    No exam performed today, done by Yessenia Teixeira NP. Patient Care Team:  Kelby Chaparro MD as PCP - General (Internal Medicine)   Yessenia Hicks Nurse Allyssa    Advice/Referrals/Counseling   Education and counseling provided:  End-of-Life planning (with patient's consent) Advanced Directives discussed with patient, given Your Right to Decide booklet and Advanced Directive paperwork to completed and bring back for chart.       Assessment/Plan   As directed by Yessenia Teixeira NP.

## 2017-06-30 LAB
ALBUMIN SERPL-MCNC: 4.9 G/DL (ref 3.2–4.6)
ALBUMIN/GLOB SERPL: 1.6 {RATIO} (ref 1.2–2.2)
ALP SERPL-CCNC: 68 IU/L (ref 39–117)
ALT SERPL-CCNC: 11 IU/L (ref 0–32)
AST SERPL-CCNC: 24 IU/L (ref 0–40)
BASOPHILS # BLD AUTO: 0.1 X10E3/UL (ref 0–0.2)
BASOPHILS NFR BLD AUTO: 1 %
BILIRUB SERPL-MCNC: 0.4 MG/DL (ref 0–1.2)
BUN SERPL-MCNC: 17 MG/DL (ref 10–36)
BUN/CREAT SERPL: 23 (ref 12–28)
CALCIUM SERPL-MCNC: 10.4 MG/DL (ref 8.7–10.3)
CHLORIDE SERPL-SCNC: 97 MMOL/L (ref 96–106)
CHOLEST SERPL-MCNC: 166 MG/DL (ref 100–199)
CO2 SERPL-SCNC: 27 MMOL/L (ref 18–29)
CREAT SERPL-MCNC: 0.74 MG/DL (ref 0.57–1)
EOSINOPHIL # BLD AUTO: 0.4 X10E3/UL (ref 0–0.4)
EOSINOPHIL NFR BLD AUTO: 3 %
ERYTHROCYTE [DISTWIDTH] IN BLOOD BY AUTOMATED COUNT: 14.7 % (ref 12.3–15.4)
GLOBULIN SER CALC-MCNC: 3.1 G/DL (ref 1.5–4.5)
GLUCOSE SERPL-MCNC: 109 MG/DL (ref 65–99)
HCT VFR BLD AUTO: 39.8 % (ref 34–46.6)
HDLC SERPL-MCNC: 83 MG/DL
HGB BLD-MCNC: 12.9 G/DL (ref 11.1–15.9)
IMM GRANULOCYTES # BLD: 0 X10E3/UL (ref 0–0.1)
IMM GRANULOCYTES NFR BLD: 0 %
INTERPRETATION, 910389: NORMAL
LDLC SERPL CALC-MCNC: 66 MG/DL (ref 0–99)
LYMPHOCYTES # BLD AUTO: 5.2 X10E3/UL (ref 0.7–3.1)
LYMPHOCYTES NFR BLD AUTO: 40 %
MCH RBC QN AUTO: 29.8 PG (ref 26.6–33)
MCHC RBC AUTO-ENTMCNC: 32.4 G/DL (ref 31.5–35.7)
MCV RBC AUTO: 92 FL (ref 79–97)
MONOCYTES # BLD AUTO: 0.8 X10E3/UL (ref 0.1–0.9)
MONOCYTES NFR BLD AUTO: 6 %
NEUTROPHILS # BLD AUTO: 6.5 X10E3/UL (ref 1.4–7)
NEUTROPHILS NFR BLD AUTO: 50 %
PLATELET # BLD AUTO: 263 X10E3/UL (ref 150–379)
POTASSIUM SERPL-SCNC: 5 MMOL/L (ref 3.5–5.2)
PROT SERPL-MCNC: 8 G/DL (ref 6–8.5)
RBC # BLD AUTO: 4.33 X10E6/UL (ref 3.77–5.28)
SODIUM SERPL-SCNC: 140 MMOL/L (ref 134–144)
TRIGL SERPL-MCNC: 83 MG/DL (ref 0–149)
VLDLC SERPL CALC-MCNC: 17 MG/DL (ref 5–40)
WBC # BLD AUTO: 13 X10E3/UL (ref 3.4–10.8)

## 2017-07-01 LAB
BACTERIA UR CULT: ABNORMAL
BACTERIA UR CULT: ABNORMAL

## 2017-07-20 DIAGNOSIS — N39.0 URINARY TRACT INFECTION WITHOUT HEMATURIA, SITE UNSPECIFIED: Primary | ICD-10-CM

## 2017-07-20 RX ORDER — CIPROFLOXACIN 500 MG/1
500 TABLET ORAL 2 TIMES DAILY
Qty: 14 TAB | Refills: 0 | Status: SHIPPED | OUTPATIENT
Start: 2017-07-20 | End: 2018-05-21 | Stop reason: SDUPTHER

## 2017-07-25 ENCOUNTER — TELEPHONE (OUTPATIENT)
Dept: FAMILY MEDICINE CLINIC | Age: 82
End: 2017-07-25

## 2017-07-25 DIAGNOSIS — H35.30 MACULAR DEGENERATION: Primary | ICD-10-CM

## 2017-07-25 NOTE — TELEPHONE ENCOUNTER
----- Message from Collins Davis sent at 7/25/2017  8:42 AM EDT -----  Regarding: NP [place]/ Telephone  Patient needs a referral to see Dr. Jacob Flanagan specialists) on 7/28.  Would like a call back on [phone]

## 2018-05-09 ENCOUNTER — OFFICE VISIT (OUTPATIENT)
Dept: FAMILY MEDICINE CLINIC | Age: 83
End: 2018-05-09

## 2018-05-09 VITALS
SYSTOLIC BLOOD PRESSURE: 150 MMHG | TEMPERATURE: 96.7 F | HEART RATE: 72 BPM | DIASTOLIC BLOOD PRESSURE: 76 MMHG | WEIGHT: 124.4 LBS | BODY MASS INDEX: 22.04 KG/M2 | OXYGEN SATURATION: 98 % | RESPIRATION RATE: 18 BRPM | HEIGHT: 63 IN

## 2018-05-09 DIAGNOSIS — R35.1 NOCTURIA: ICD-10-CM

## 2018-05-09 DIAGNOSIS — I10 ESSENTIAL HYPERTENSION: Primary | ICD-10-CM

## 2018-05-09 DIAGNOSIS — E78.00 HYPERCHOLESTEREMIA: ICD-10-CM

## 2018-05-09 LAB
BACTERIA UA POCT, BACTPOCT: NORMAL
BILIRUB UR QL STRIP: NEGATIVE
CASTS UA POCT: 0
CLUE CELLS, CLUEPOCT: NEGATIVE
CRYSTALS UA POCT, CRYSPOCT: NEGATIVE
EPITHELIAL CELLS POCT: NEGATIVE
GLUCOSE UR-MCNC: NEGATIVE MG/DL
HBA1C MFR BLD HPLC: 6 %
KETONES P FAST UR STRIP-MCNC: NEGATIVE MG/DL
MUCUS UA POCT, MUCPOCT: NORMAL
PH UR STRIP: 7.5 [PH] (ref 4.6–8)
PROT UR QL STRIP: NORMAL
RBC UA POCT, RBCPOCT: NORMAL
SP GR UR STRIP: 1.02 (ref 1–1.03)
TRICH UA POCT, TRICHPOC: NEGATIVE
UA UROBILINOGEN AMB POC: NORMAL (ref 0.2–1)
URINALYSIS CLARITY POC: NORMAL
URINALYSIS COLOR POC: YELLOW
URINE BLOOD POC: NORMAL
URINE CULT COMMENT, POCT: NORMAL
URINE LEUKOCYTES POC: NORMAL
URINE NITRITES POC: NEGATIVE
WBC UA POCT, WBCPOCT: NORMAL
YEAST UA POCT, YEASTPOC: NEGATIVE

## 2018-05-09 NOTE — PROGRESS NOTES
Chief Complaint   Patient presents with    Hypertension     follow up     1. Have you been to the ER, urgent care clinic since your last visit? Hospitalized since your last visit? No    2. Have you seen or consulted any other health care providers outside of the [facility] since your last visit? Include any pap smears or colon screening.   NO

## 2018-05-09 NOTE — PATIENT INSTRUCTIONS

## 2018-05-09 NOTE — MR AVS SNAPSHOT
[place] [id number] [place] [id number] [phone] Patient: Nelli Dailey MRN: [id number] NBI:2/0/4286 Visit Information Date & Time Provider Department Dept. Phone Encounter #  
 5/9/2018  8:00 AM Mouna Bey NP [facility] [phone] [id number] Follow-up Instructions Return in about 6 months (around 11/9/2018). Upcoming Health Maintenance Date Due  
 MEDICARE YEARLY EXAM 6/30/2018 Influenza Age 5 to Adult 8/1/2018 GLAUCOMA SCREENING Q2Y 8/23/2018 DTaP/Tdap/Td series (2 - Td) 6/29/2027 Allergies as of 5/9/2018  Review Complete On: 5/9/2018 By: Criselda Benitez LPN No Known Allergies Current Immunizations  Reviewed on 10/4/2016 Name Date Influenza High Dose Vaccine PF 9/12/2017, 10/4/2016, 10/14/2015 Influenza Vaccine 9/12/2014, 9/12/2013 Influenza Vaccine Split 10/15/2011, 10/12/2010 Not reviewed this visit You Were Diagnosed With   
  
 Codes Comments Hypercholesteremia    -  Primary ICD-10-CM: E78.00 ICD-9-CM: 272.0 Essential hypertension     ICD-10-CM: I10 
ICD-9-CM: 401.9 Vitals BP Pulse Temp Resp Height(growth percentile) Weight(growth percentile) 161/73 (BP 1 Location: Right arm, BP Patient Position: Sitting) 72 96.7 °F (35.9 °C) (Oral) 18 5' 3\" (1.6 m) 124 lb 6.4 oz (56.4 kg) SpO2 BMI OB Status Smoking Status 98% 22.04 kg/m2 Hysterectomy Former Smoker Vitals History BMI and BSA Data Body Mass Index Body Surface Area 22.04 kg/m 2 1.58 m 2 Your Updated Medication List  
  
   
This list is accurate as of 5/9/18  8:49 AM.  Always use your most recent med list. amLODIPine 10 mg tablet Commonly known as:  Gisel Baca TAKE ONE TABLET BY MOUTH ONCE DAILY  
  
 aspirin delayed-release 81 mg tablet Take  by mouth daily. CALCARB 600 WITH VITAMIN D tablet Generic drug:  calcium-cholecalciferol (D3) Take 1 Tab by mouth daily. FISH OIL 1,000 mg Cap Generic drug:  omega-3 fatty acids-vitamin e Take 1 Cap by mouth. PXNWNCGR-ZBRF-APRADL-HYALUR AC PO Take  by mouth. hydroCHLOROthiazide 12.5 mg tablet Commonly known as:  HYDRODIURIL  
TAKE ONE TABLET BY MOUTH ONCE DAILY  
  
 lisinopril 20 mg tablet Commonly known as:  PRINIVIL, ZESTRIL  
TAKE ONE TABLET BY MOUTH ONCE DAILY  
  
 OCUVITE WITH LUTEIN tablet Generic drug:  vit a,c & e-lutein-minerals  
daily. polyethylene glycol 17 gram/dose powder Commonly known as:  Court Hugger DILUTE ONE CAPFULL IN WATER AS DIRECTED DAILY  
  
 simvastatin 20 mg tablet Commonly known as:  ZOCOR  
TAKE ONE TABLET BY MOUTH IN THE EVENING  
  
 TYLENOL ARTHRITIS 650 mg Tber Generic drug:  acetaminophen Take 1,300 mg by mouth every twelve (12) hours as needed. We Performed the Following AMB POC HEMOGLOBIN A1C [36211 CPT(R)] AMB POC URINALYSIS DIP STICK AUTO W/ MICRO  [56543 CPT(R)] CBC WITH AUTOMATED DIFF [40516 CPT(R)] LIPID PANEL [68525 CPT(R)] METABOLIC PANEL, COMPREHENSIVE [20870 CPT(R)] Follow-up Instructions Return in about 6 months (around 11/9/2018). Patient Instructions A Healthy Lifestyle: Care Instructions Your Care Instructions A healthy lifestyle can help you feel good, stay at a healthy weight, and have plenty of energy for both work and play. A healthy lifestyle is something you can share with your whole family. A healthy lifestyle also can lower your risk for serious health problems, such as high blood pressure, heart disease, and diabetes. You can follow a few steps listed below to improve your health and the health of your family. Follow-up care is a key part of your treatment and safety.  Be sure to make and go to all appointments, and call your doctor if you are having problems. It's also a good idea to know your test results and keep a list of the medicines you take. How can you care for yourself at home? · Do not eat too much sugar, fat, or fast foods. You can still have dessert and treats now and then. The goal is moderation. · Start small to improve your eating habits. Pay attention to portion sizes, drink less juice and soda pop, and eat more fruits and vegetables. ¨ Eat a healthy amount of food. A 3-ounce serving of meat, for example, is about the size of a deck of cards. Fill the rest of your plate with vegetables and whole grains. ¨ Limit the amount of soda and sports drinks you have every day. Drink more water when you are thirsty. ¨ Eat at least 5 servings of fruits and vegetables every day. It may seem like a lot, but it is not hard to reach this goal. A serving or helping is 1 piece of fruit, 1 cup of vegetables, or 2 cups of leafy, raw vegetables. Have an apple or some carrot sticks as an afternoon snack instead of a candy bar. Try to have fruits and/or vegetables at every meal. 
· Make exercise part of your daily routine. You may want to start with simple activities, such as walking, bicycling, or slow swimming. Try to be active 30 to 60 minutes every day. You do not need to do all 30 to 60 minutes all at once. For example, you can exercise 3 times a day for 10 or 20 minutes. Moderate exercise is safe for most people, but it is always a good idea to talk to your doctor before starting an exercise program. 
· Keep moving. [place] the lawn, work in the garden, or Prizzm. Take the stairs instead of the elevator at work. · If you smoke, quit. People who smoke have an increased risk for heart attack, stroke, cancer, and other lung illnesses. Quitting is hard, but there are ways to boost your chance of quitting tobacco for good. ¨ Use nicotine gum, patches, or lozenges. ¨ Ask your doctor about stop-smoking programs and medicines. ¨ Keep trying. In addition to reducing your risk of diseases in the future, you will notice some benefits soon after you stop using tobacco. If you have shortness of breath or asthma symptoms, they will likely get better within a few weeks after you quit. · Limit how much alcohol you drink. Moderate amounts of alcohol (up to 2 drinks a day for men, 1 drink a day for women) are okay. But drinking too much can lead to liver problems, high blood pressure, and other health problems. Family health If you have a family, there are many things you can do together to improve your health. · Eat meals together as a family as often as possible. · Eat healthy foods. This includes fruits, vegetables, lean meats and dairy, and whole grains. · Include your family in your fitness plan. Most people think of activities such as jogging or tennis as the way to fitness, but there are many ways you and your family can be more active. Anything that makes you breathe hard and gets your heart pumping is exercise. Here are some tips: 
¨ Walk to do errands or to take your child to school or the bus. ¨ Go for a family bike ride after dinner instead of watching TV. Where can you learn more? Go to http://yenySOF Studiosbret.info/. Enter Q573 in the search box to learn more about \"A Healthy Lifestyle: Care Instructions. \" Current as of: May 12, 2017 Content Version: 11.4 © 1530-8191 Healthwise, Incorporated. Care instructions adapted under license by [employer] (which disclaims liability or warranty for this information). If you have questions about a medical condition or this instruction, always ask your healthcare professional. [address] any warranty or liability for your use of this information. Introducing \[facility] [place] [facility]\"" & HEALTH SERVICES! Cara Buckner introduces [facility] patient portal. Now you can access parts of your medical record, email your doctor's office, and request medication refills online. 1. In your internet browser, go to [email or web address] for Google Glass. [employer] 2. Click on the First Time User? Click Here link in the Sign In box. You will see the New Member Sign Up page. 3. Enter your K[employer]agent Access Code exactly as it appears below. You will not need to use this code after youve completed the sign-up process. If you do not sign up before the expiration date, you must request a new code. · K[employer] Access Code: [email or web address] Expires: 8/7/2018  8:15 AM 
 
4. Enter the last four digits of your Social Security Number (xxxx) and Date of Birth (mm/dd/yyyy) as indicated and click Submit. You will be taken to the next sign-up page. 5. Create a MPSTORt ID. This will be your Kontagent login ID and cannot be changed, so think of one that is secure and easy to remember. 6. Create a K[employer]agent password. You can change your password at any time. 7. Enter your Password Reset Question and Answer. This can be used at a later time if you forget your password. 8. Enter your e-mail address. You will receive e-mail notification when new information is available in [address] 9. Click Sign Up. You can now view and download portions of your medical record. 10. Click the Download Summary menu link to download a portable copy of your medical information. If you have questions, please visit the Frequently Asked Questions section of the [employer] website. Remember, K[employer]agent is NOT to be used for urgent needs. For medical emergencies, dial 911. Now available from your iPhone and Android! Please provide this summary of care documentation to your next provider. Your primary care clinician is listed as [address] [facility]. If you have any questions after today's visit, please call [phone].

## 2018-05-09 NOTE — PROGRESS NOTES
HISTORY OF PRESENT ILLNESS  Taco Malin is a 80 y.o. female. HPI  Patient comes in today for follow up. Last visit 6/29/17. Patient states compliance with BP medications. Denies any problems with medications. Patient states she took medications this morning. Denies chest pains, palpitations, dyspnea, dizziness, tingling in extremities, headache. Appetite good. Urinating without difficulty. Watches TV during day, will try and walk depending on weather. Sleeping okay at night. Goes to bed around 11p - 7a. Denies napping during day. Will get up few times during night to urinate. Denies polydipsia, fatigue. Daughter with patient today. Indicates that patient does nap during the day, and gets up to urinate multiple times during the night. No Known Allergies    Past Medical History:   Diagnosis Date    Disc disease, degenerative, lumbar or lumbosacral     HTN (hypertension) 4/9/2010    Hypercholesteremia 4/9/2010    Osteoporosis, age related        Past Surgical History:   Procedure Laterality Date    ABDOMEN SURGERY PROC UNLISTED      ENDOSCOPY, COLON, DIAGNOSTIC      HX CATARACT REMOVAL      HX GYN         Social History     Social History    Marital status:      Spouse name: N/A    Number of children: N/A    Years of education: N/A     Occupational History    Not on file.      Social History Main Topics    Smoking status: Former Smoker     Quit date: 5/3/1986    Smokeless tobacco: Never Used    Alcohol use Yes      Comment: wine occas    Drug use: No    Sexual activity: Not Currently     Other Topics Concern    Not on file     Social History Narrative       Family History   Problem Relation Age of Onset    Heart Disease Mother     Cancer Father      colon    No Known Problems Sister     No Known Problems Brother        Current Outpatient Prescriptions   Medication Sig    hydroCHLOROthiazide (HYDRODIURIL) 12.5 mg tablet TAKE ONE TABLET BY MOUTH ONCE DAILY    amLODIPine (NORVASC) 10 mg tablet TAKE ONE TABLET BY MOUTH ONCE DAILY    lisinopril (PRINIVIL, ZESTRIL) 20 mg tablet TAKE ONE TABLET BY MOUTH ONCE DAILY    simvastatin (ZOCOR) 20 mg tablet TAKE ONE TABLET BY MOUTH IN THE EVENING    polyethylene glycol (MIRALAX) 17 gram/dose powder DILUTE ONE CAPFULL IN WATER AS DIRECTED DAILY    acetaminophen (TYLENOL ARTHRITIS) 650 mg CR tablet Take 1,300 mg by mouth every twelve (12) hours as needed.  calcium-cholecalciferol, D3, (CALCARB 600 WITH VITAMIN D) tablet Take 1 Tab by mouth daily.  vit a,c & e-lutein-minerals (OCUVITE) tablet daily.  aspirin delayed-release 81 mg tablet Take  by mouth daily.  omega-3 fatty acids-vitamin e (FISH OIL) 1,000 mg Cap Take 1 Cap by mouth.  GLUC HCL/CSA/NGOZI HY/HYALUR AC (SWROZFZZ-QESU-ALTAUS-HYALUR AC PO) Take  by mouth. No current facility-administered medications for this visit. Review of Systems   Constitutional: Negative for chills, fever, malaise/fatigue and weight loss. Respiratory: Negative for cough and shortness of breath. Cardiovascular: Negative for chest pain, palpitations and leg swelling. Gastrointestinal: Negative for abdominal pain, nausea and vomiting. Genitourinary: Positive for frequency (nocturia). Negative for dysuria, flank pain, hematuria and urgency. Musculoskeletal: Negative for back pain, joint pain and myalgias. Skin: Negative. Neurological: Negative for dizziness, tingling, sensory change, speech change, focal weakness and headaches. Psychiatric/Behavioral: Negative for depression. The patient is not nervous/anxious and does not have insomnia. Vitals:    05/09/18 0810   BP: 161/73   Pulse: 72   Resp: 18   Temp: 96.7 °F (35.9 °C)   TempSrc: Oral   SpO2: 98%   Weight: 124 lb 6.4 oz (56.4 kg)   Height: 5' 3\" (1.6 m)     Physical Exam   Constitutional: She is oriented to person, place, and time. She appears well-developed and well-nourished. She is cooperative.    Neck: Carotid bruit is not present. No thyromegaly present. Cardiovascular: Normal rate, regular rhythm and normal heart sounds. Pulses:       Radial pulses are 2+ on the right side, and 2+ on the left side. Dorsalis pedis pulses are 2+ on the right side, and 2+ on the left side. Pulmonary/Chest: Effort normal.   Abdominal: Soft. Normal appearance and bowel sounds are normal. There is no tenderness. Neurological: She is alert and oriented to person, place, and time. Skin: Skin is warm and dry. Psychiatric: She has a normal mood and affect. Her behavior is normal. Judgment and thought content normal.   Vitals reviewed. ASSESSMENT and PLAN    ICD-10-CM ICD-9-CM    1. Essential hypertension I10 401.9 AMB POC URINALYSIS DIP STICK AUTO W/ MICRO       CBC WITH AUTOMATED DIFF      METABOLIC PANEL, COMPREHENSIVE   2. Hypercholesteremia G57.04 536.5 METABOLIC PANEL, COMPREHENSIVE      LIPID PANEL   3. Nocturia R35.1 788.43 AMB POC HEMOGLOBIN A1C      CULTURE, URINE     Encounter Diagnoses   Name Primary?  Essential hypertension Yes    Hypercholesteremia     Nocturia      Orders Placed This Encounter    CULTURE, URINE    CBC WITH AUTOMATED DIFF    METABOLIC PANEL, COMPREHENSIVE    LIPID PANEL    AMB POC URINALYSIS DIP STICK AUTO W/ MICRO     AMB POC HEMOGLOBIN A1C     Diagnoses and all orders for this visit:    1. Essential hypertension - not at goal but patient did not take BP medication this morning prior to visit. -     AMB POC URINALYSIS DIP STICK AUTO W/ MICRO   -     CBC WITH AUTOMATED DIFF  -     METABOLIC PANEL, COMPREHENSIVE    2. Hypercholesteremia  -     METABOLIC PANEL, COMPREHENSIVE  -     LIPID PANEL    3. Nocturia  -     AMB POC HEMOGLOBIN A1C  -     URINE CULTURE      Follow-up Disposition:  Return in about 6 months (around 11/9/2018). lab results and schedule of future lab studies reviewed with patient    I have reviewed the patient's allergies and made any necessary changes. Medical, procedural, social and family histories have been reviewed and updated as medically indicated. I have reconciled and/or revised patient medications in the EMR. I have discussed each diagnosis listed in this note with Galilea Chamberlain and/or their family. I have discussed treatment options and the risk/benefit analysis of those options, including safe use of medications and possible medication side effects. Through the use of shared decision making we have agreed to the above plan. The patient has received an after-visit summary and questions were answered concerning future plans. Olivia Brooks, CHUYITA-C    This note will not be viewable in [place].

## 2018-05-10 LAB
ALBUMIN SERPL-MCNC: 4.8 G/DL (ref 3.2–4.6)
ALBUMIN/GLOB SERPL: 1.5 {RATIO} (ref 1.2–2.2)
ALP SERPL-CCNC: 69 IU/L (ref 39–117)
ALT SERPL-CCNC: 12 IU/L (ref 0–32)
AST SERPL-CCNC: 25 IU/L (ref 0–40)
BASOPHILS # BLD AUTO: 0.1 X10E3/UL (ref 0–0.2)
BASOPHILS NFR BLD AUTO: 1 %
BILIRUB SERPL-MCNC: 0.5 MG/DL (ref 0–1.2)
BUN SERPL-MCNC: 23 MG/DL (ref 10–36)
BUN/CREAT SERPL: 32 (ref 12–28)
CALCIUM SERPL-MCNC: 9.9 MG/DL (ref 8.7–10.3)
CHLORIDE SERPL-SCNC: 98 MMOL/L (ref 96–106)
CHOLEST SERPL-MCNC: 165 MG/DL (ref 100–199)
CO2 SERPL-SCNC: 24 MMOL/L (ref 18–29)
CREAT SERPL-MCNC: 0.73 MG/DL (ref 0.57–1)
EOSINOPHIL # BLD AUTO: 0.2 X10E3/UL (ref 0–0.4)
EOSINOPHIL NFR BLD AUTO: 2 %
ERYTHROCYTE [DISTWIDTH] IN BLOOD BY AUTOMATED COUNT: 14.2 % (ref 12.3–15.4)
GFR SERPLBLD CREATININE-BSD FMLA CKD-EPI: 71 ML/MIN/1.73
GFR SERPLBLD CREATININE-BSD FMLA CKD-EPI: 82 ML/MIN/1.73
GLOBULIN SER CALC-MCNC: 3.3 G/DL (ref 1.5–4.5)
GLUCOSE SERPL-MCNC: 99 MG/DL (ref 65–99)
HCT VFR BLD AUTO: 40.9 % (ref 34–46.6)
HDLC SERPL-MCNC: 84 MG/DL
HGB BLD-MCNC: 13.5 G/DL (ref 11.1–15.9)
IMM GRANULOCYTES # BLD: 0 X10E3/UL (ref 0–0.1)
IMM GRANULOCYTES NFR BLD: 0 %
INTERPRETATION, 910389: NORMAL
LDLC SERPL CALC-MCNC: 65 MG/DL (ref 0–99)
LYMPHOCYTES # BLD AUTO: 4.8 X10E3/UL (ref 0.7–3.1)
LYMPHOCYTES NFR BLD AUTO: 42 %
MCH RBC QN AUTO: 31.2 PG (ref 26.6–33)
MCHC RBC AUTO-ENTMCNC: 33 G/DL (ref 31.5–35.7)
MCV RBC AUTO: 95 FL (ref 79–97)
MONOCYTES # BLD AUTO: 0.8 X10E3/UL (ref 0.1–0.9)
MONOCYTES NFR BLD AUTO: 7 %
NEUTROPHILS # BLD AUTO: 5.5 X10E3/UL (ref 1.4–7)
NEUTROPHILS NFR BLD AUTO: 48 %
PLATELET # BLD AUTO: 239 X10E3/UL (ref 150–379)
POTASSIUM SERPL-SCNC: 4.1 MMOL/L (ref 3.5–5.2)
PROT SERPL-MCNC: 8.1 G/DL (ref 6–8.5)
RBC # BLD AUTO: 4.33 X10E6/UL (ref 3.77–5.28)
SODIUM SERPL-SCNC: 139 MMOL/L (ref 134–144)
TRIGL SERPL-MCNC: 78 MG/DL (ref 0–149)
VLDLC SERPL CALC-MCNC: 16 MG/DL (ref 5–40)
WBC # BLD AUTO: 11.4 X10E3/UL (ref 3.4–10.8)

## 2018-05-11 LAB
BACTERIA UR CULT: ABNORMAL
BACTERIA UR CULT: ABNORMAL

## 2018-05-21 ENCOUNTER — TELEPHONE (OUTPATIENT)
Dept: FAMILY MEDICINE CLINIC | Age: 83
End: 2018-05-21

## 2018-05-21 DIAGNOSIS — N39.0 URINARY TRACT INFECTION WITHOUT HEMATURIA, SITE UNSPECIFIED: ICD-10-CM

## 2018-05-21 RX ORDER — CIPROFLOXACIN 500 MG/1
500 TABLET ORAL 2 TIMES DAILY
Qty: 14 TAB | Refills: 0 | Status: SHIPPED | OUTPATIENT
Start: 2018-05-21 | End: 2018-05-28

## 2018-05-21 NOTE — TELEPHONE ENCOUNTER
Called pt and left a vm advising pt of UTI results and that a rx had been called into her pharmacy to take x 7 days twice a day.

## 2019-01-01 ENCOUNTER — LAB ONLY (OUTPATIENT)
Dept: FAMILY MEDICINE CLINIC | Age: 84
End: 2019-01-01

## 2019-01-01 ENCOUNTER — TELEPHONE (OUTPATIENT)
Dept: FAMILY MEDICINE CLINIC | Age: 84
End: 2019-01-01

## 2019-01-01 ENCOUNTER — OFFICE VISIT (OUTPATIENT)
Dept: FAMILY MEDICINE CLINIC | Age: 84
End: 2019-01-01

## 2019-01-01 ENCOUNTER — HOSPITAL ENCOUNTER (EMERGENCY)
Age: 84
Discharge: HOME | End: 2019-10-29
Attending: EMERGENCY MEDICINE
Payer: MEDICARE

## 2019-01-01 ENCOUNTER — HOSPITAL ENCOUNTER (EMERGENCY)
Age: 84
Discharge: HOME | End: 2019-10-28
Attending: EMERGENCY MEDICINE
Payer: MEDICARE

## 2019-01-01 VITALS
BODY MASS INDEX: 22.11 KG/M2 | HEART RATE: 85 BPM | TEMPERATURE: 96.9 F | RESPIRATION RATE: 18 BRPM | SYSTOLIC BLOOD PRESSURE: 158 MMHG | DIASTOLIC BLOOD PRESSURE: 74 MMHG | OXYGEN SATURATION: 97 % | WEIGHT: 129.5 LBS | HEIGHT: 64 IN

## 2019-01-01 VITALS
OXYGEN SATURATION: 99 % | SYSTOLIC BLOOD PRESSURE: 149 MMHG | RESPIRATION RATE: 18 BRPM | DIASTOLIC BLOOD PRESSURE: 70 MMHG | HEIGHT: 63 IN | BODY MASS INDEX: 21.86 KG/M2 | WEIGHT: 123.4 LBS | HEART RATE: 79 BPM | TEMPERATURE: 95.7 F

## 2019-01-01 VITALS
HEIGHT: 64 IN | BODY MASS INDEX: 21.34 KG/M2 | TEMPERATURE: 98.8 F | OXYGEN SATURATION: 98 % | WEIGHT: 125 LBS | SYSTOLIC BLOOD PRESSURE: 120 MMHG | HEART RATE: 85 BPM | DIASTOLIC BLOOD PRESSURE: 70 MMHG | RESPIRATION RATE: 18 BRPM

## 2019-01-01 VITALS
WEIGHT: 120 LBS | BODY MASS INDEX: 21.26 KG/M2 | HEIGHT: 63 IN | OXYGEN SATURATION: 95 % | DIASTOLIC BLOOD PRESSURE: 84 MMHG | TEMPERATURE: 98.3 F | HEART RATE: 115 BPM | RESPIRATION RATE: 16 BRPM | SYSTOLIC BLOOD PRESSURE: 139 MMHG

## 2019-01-01 DIAGNOSIS — R10.2 PELVIC PAIN: ICD-10-CM

## 2019-01-01 DIAGNOSIS — E78.5 HYPERLIPIDEMIA, UNSPECIFIED HYPERLIPIDEMIA TYPE: ICD-10-CM

## 2019-01-01 DIAGNOSIS — R82.90 ABNORMAL FINDING IN URINE: ICD-10-CM

## 2019-01-01 DIAGNOSIS — H35.30 MACULAR DEGENERATION, UNSPECIFIED LATERALITY, UNSPECIFIED TYPE: ICD-10-CM

## 2019-01-01 DIAGNOSIS — Z00.00 MEDICARE ANNUAL WELLNESS VISIT, SUBSEQUENT: ICD-10-CM

## 2019-01-01 DIAGNOSIS — N17.9 AKI (ACUTE KIDNEY INJURY) (HCC): ICD-10-CM

## 2019-01-01 DIAGNOSIS — I10 ESSENTIAL HYPERTENSION: Primary | ICD-10-CM

## 2019-01-01 DIAGNOSIS — N30.00 ACUTE CYSTITIS WITHOUT HEMATURIA: Primary | ICD-10-CM

## 2019-01-01 DIAGNOSIS — R73.03 PREDIABETES: ICD-10-CM

## 2019-01-01 DIAGNOSIS — N39.0 URINARY TRACT INFECTION WITHOUT HEMATURIA, SITE UNSPECIFIED: Primary | ICD-10-CM

## 2019-01-01 DIAGNOSIS — R82.90 ABNORMAL URINALYSIS: Primary | ICD-10-CM

## 2019-01-01 LAB
ALBUMIN SERPL-MCNC: 3 G/DL (ref 3.5–5)
ALBUMIN SERPL-MCNC: 3.4 G/DL (ref 3.5–5)
ALBUMIN SERPL-MCNC: 3.9 G/DL (ref 3.2–4.6)
ALBUMIN SERPL-MCNC: 5 G/DL (ref 3.2–4.6)
ALBUMIN/GLOB SERPL: 0.7 {RATIO} (ref 1.1–2.2)
ALBUMIN/GLOB SERPL: 0.7 {RATIO} (ref 1.1–2.2)
ALBUMIN/GLOB SERPL: 1.2 {RATIO} (ref 1.2–2.2)
ALBUMIN/GLOB SERPL: 1.5 {RATIO} (ref 1.2–2.2)
ALP SERPL-CCNC: 62 IU/L (ref 39–117)
ALP SERPL-CCNC: 67 U/L (ref 45–117)
ALP SERPL-CCNC: 70 U/L (ref 45–117)
ALP SERPL-CCNC: 72 IU/L (ref 39–117)
ALT SERPL-CCNC: 11 IU/L (ref 0–32)
ALT SERPL-CCNC: 17 U/L (ref 12–78)
ALT SERPL-CCNC: 20 U/L (ref 12–78)
ALT SERPL-CCNC: 9 IU/L (ref 0–32)
ANION GAP SERPL CALC-SCNC: 10 MMOL/L (ref 5–15)
ANION GAP SERPL CALC-SCNC: 8 MMOL/L (ref 5–15)
APPEARANCE UR: CLEAR
AST SERPL-CCNC: 16 IU/L (ref 0–40)
AST SERPL-CCNC: 22 IU/L (ref 0–40)
AST SERPL-CCNC: 32 U/L (ref 15–37)
AST SERPL-CCNC: 44 U/L (ref 15–37)
BACTERIA SPEC CULT: ABNORMAL
BACTERIA SPEC CULT: NORMAL
BACTERIA UA POCT, BACTPOCT: NORMAL
BACTERIA UR CULT: ABNORMAL
BACTERIA UR CULT: NORMAL
BACTERIA URNS QL MICRO: ABNORMAL /HPF
BASOPHILS # BLD AUTO: 0 X10E3/UL (ref 0–0.2)
BASOPHILS # BLD: 0 K/UL (ref 0–0.1)
BASOPHILS # BLD: 0.1 K/UL (ref 0–0.1)
BASOPHILS NFR BLD AUTO: 0 %
BASOPHILS NFR BLD: 0 % (ref 0–1)
BASOPHILS NFR BLD: 0 % (ref 0–1)
BILIRUB SERPL-MCNC: 0.3 MG/DL (ref 0–1.2)
BILIRUB SERPL-MCNC: 0.5 MG/DL (ref 0.2–1)
BILIRUB SERPL-MCNC: 0.6 MG/DL (ref 0.2–1)
BILIRUB SERPL-MCNC: 0.6 MG/DL (ref 0–1.2)
BILIRUB UR QL STRIP: NEGATIVE
BILIRUB UR QL: NEGATIVE
BUN SERPL-MCNC: 15 MG/DL (ref 10–36)
BUN SERPL-MCNC: 16 MG/DL (ref 6–20)
BUN SERPL-MCNC: 21 MG/DL (ref 10–36)
BUN SERPL-MCNC: 28 MG/DL (ref 6–20)
BUN/CREAT SERPL: 17 (ref 12–20)
BUN/CREAT SERPL: 22 (ref 12–20)
BUN/CREAT SERPL: 23 (ref 12–28)
BUN/CREAT SERPL: 26 (ref 12–28)
CALCIUM SERPL-MCNC: 10.4 MG/DL (ref 8.7–10.3)
CALCIUM SERPL-MCNC: 9 MG/DL (ref 8.5–10.1)
CALCIUM SERPL-MCNC: 9.2 MG/DL (ref 8.5–10.1)
CALCIUM SERPL-MCNC: 9.3 MG/DL (ref 8.7–10.3)
CASTS UA POCT: NORMAL
CC UR VC: ABNORMAL
CHLORIDE SERPL-SCNC: 102 MMOL/L (ref 97–108)
CHLORIDE SERPL-SCNC: 103 MMOL/L (ref 97–108)
CHLORIDE SERPL-SCNC: 97 MMOL/L (ref 96–106)
CHLORIDE SERPL-SCNC: 99 MMOL/L (ref 96–106)
CHOLEST SERPL-MCNC: 172 MG/DL (ref 100–199)
CLUE CELLS, CLUEPOCT: NORMAL
CO2 SERPL-SCNC: 23 MMOL/L (ref 21–32)
CO2 SERPL-SCNC: 24 MMOL/L (ref 20–29)
CO2 SERPL-SCNC: 25 MMOL/L (ref 20–29)
CO2 SERPL-SCNC: 27 MMOL/L (ref 21–32)
COLOR UR: ABNORMAL
CREAT SERPL-MCNC: 0.57 MG/DL (ref 0.57–1)
CREAT SERPL-MCNC: 0.73 MG/DL (ref 0.55–1.02)
CREAT SERPL-MCNC: 0.91 MG/DL (ref 0.57–1)
CREAT SERPL-MCNC: 1.67 MG/DL (ref 0.55–1.02)
CRYSTALS UA POCT, CRYSPOCT: NORMAL
DIFFERENTIAL METHOD BLD: ABNORMAL
DIFFERENTIAL METHOD BLD: ABNORMAL
EOSINOPHIL # BLD AUTO: 0.2 X10E3/UL (ref 0–0.4)
EOSINOPHIL # BLD: 0 K/UL (ref 0–0.4)
EOSINOPHIL # BLD: 0.1 K/UL (ref 0–0.4)
EOSINOPHIL NFR BLD AUTO: 2 %
EOSINOPHIL NFR BLD: 0 % (ref 0–7)
EOSINOPHIL NFR BLD: 0 % (ref 0–7)
EPITH CASTS URNS QL MICRO: ABNORMAL /LPF
EPITHELIAL CELLS POCT: NORMAL
ERYTHROCYTE [DISTWIDTH] IN BLOOD BY AUTOMATED COUNT: 13 % (ref 11.5–14.5)
ERYTHROCYTE [DISTWIDTH] IN BLOOD BY AUTOMATED COUNT: 13.1 % (ref 11.5–14.5)
ERYTHROCYTE [DISTWIDTH] IN BLOOD BY AUTOMATED COUNT: 14 % (ref 12.3–15.4)
GLOBULIN SER CALC-MCNC: 3.2 G/DL (ref 1.5–4.5)
GLOBULIN SER CALC-MCNC: 3.3 G/DL (ref 1.5–4.5)
GLOBULIN SER CALC-MCNC: 4.3 G/DL (ref 2–4)
GLOBULIN SER CALC-MCNC: 4.6 G/DL (ref 2–4)
GLUCOSE SERPL-MCNC: 107 MG/DL (ref 65–99)
GLUCOSE SERPL-MCNC: 112 MG/DL (ref 65–100)
GLUCOSE SERPL-MCNC: 148 MG/DL (ref 65–100)
GLUCOSE SERPL-MCNC: 94 MG/DL (ref 65–99)
GLUCOSE UR STRIP.AUTO-MCNC: NEGATIVE MG/DL
GLUCOSE UR-MCNC: NEGATIVE MG/DL
GRAN# POC: NORMAL
GRAN% POC: NORMAL
HBA1C MFR BLD HPLC: 6.1 %
HCT VFR BLD AUTO: 34.6 % (ref 35–47)
HCT VFR BLD AUTO: 38.7 % (ref 35–47)
HCT VFR BLD AUTO: 42 % (ref 34–46.6)
HCT VFR BLD CALC: NORMAL %
HDLC SERPL-MCNC: 81 MG/DL
HGB BLD-MCNC: 11 G/DL (ref 11.5–16)
HGB BLD-MCNC: 12.5 G/DL (ref 11.5–16)
HGB BLD-MCNC: 13.9 G/DL (ref 11.1–15.9)
HGB BLD-MCNC: NORMAL G/DL
HGB UR QL STRIP: ABNORMAL
HYALINE CASTS URNS QL MICRO: ABNORMAL /LPF (ref 0–5)
IMM GRANULOCYTES # BLD AUTO: 0 X10E3/UL (ref 0–0.1)
IMM GRANULOCYTES # BLD AUTO: 0.1 K/UL (ref 0–0.04)
IMM GRANULOCYTES # BLD AUTO: 0.1 K/UL (ref 0–0.04)
IMM GRANULOCYTES NFR BLD AUTO: 0 %
IMM GRANULOCYTES NFR BLD AUTO: 1 % (ref 0–0.5)
IMM GRANULOCYTES NFR BLD AUTO: 1 % (ref 0–0.5)
INTERPRETATION, 910389: NORMAL
INTERPRETATION: NORMAL
KETONES P FAST UR STRIP-MCNC: NEGATIVE MG/DL
KETONES P FAST UR STRIP-MCNC: NORMAL MG/DL
KETONES P FAST UR STRIP-MCNC: NORMAL MG/DL
KETONES UR QL STRIP.AUTO: 15 MG/DL
LACTATE BLD-SCNC: 0.91 MMOL/L (ref 0.4–2)
LDLC SERPL CALC-MCNC: 74 MG/DL (ref 0–99)
LEUKOCYTE ESTERASE UR QL STRIP.AUTO: ABNORMAL
LY# POC: NORMAL
LY% POC: NORMAL
LYMPHOCYTES # BLD AUTO: 4.7 X10E3/UL (ref 0.7–3.1)
LYMPHOCYTES # BLD: 3.5 K/UL (ref 0.8–3.5)
LYMPHOCYTES # BLD: 3.8 K/UL (ref 0.8–3.5)
LYMPHOCYTES NFR BLD AUTO: 42 %
LYMPHOCYTES NFR BLD: 20 % (ref 12–49)
LYMPHOCYTES NFR BLD: 22 % (ref 12–49)
MCH RBC QN AUTO: 30 PG (ref 26–34)
MCH RBC QN AUTO: 30 PG (ref 26–34)
MCH RBC QN AUTO: 31.4 PG (ref 26.6–33)
MCH RBC QN: NORMAL PG
MCHC RBC AUTO-ENTMCNC: 31.8 G/DL (ref 30–36.5)
MCHC RBC AUTO-ENTMCNC: 32.3 G/DL (ref 30–36.5)
MCHC RBC AUTO-ENTMCNC: 33.1 G/DL (ref 31.5–35.7)
MCHC RBC-ENTMCNC: NORMAL G/DL
MCV RBC AUTO: 92.8 FL (ref 80–99)
MCV RBC AUTO: 94.3 FL (ref 80–99)
MCV RBC AUTO: 95 FL (ref 79–97)
MCV RBC: NORMAL FL
MID #, POC: NORMAL
MID% POC: NORMAL
MONOCYTES # BLD AUTO: 0.7 X10E3/UL (ref 0.1–0.9)
MONOCYTES # BLD: 0.9 K/UL (ref 0–1)
MONOCYTES # BLD: 0.9 K/UL (ref 0–1)
MONOCYTES NFR BLD AUTO: 6 %
MONOCYTES NFR BLD: 5 % (ref 5–13)
MONOCYTES NFR BLD: 6 % (ref 5–13)
MUCUS UA POCT, MUCPOCT: NORMAL
NEUTROPHILS # BLD AUTO: 5.5 X10E3/UL (ref 1.4–7)
NEUTROPHILS NFR BLD AUTO: 50 %
NEUTS SEG # BLD: 11.3 K/UL (ref 1.8–8)
NEUTS SEG # BLD: 14.4 K/UL (ref 1.8–8)
NEUTS SEG NFR BLD: 71 % (ref 32–75)
NEUTS SEG NFR BLD: 74 % (ref 32–75)
NITRITE UR QL STRIP.AUTO: POSITIVE
NRBC # BLD: 0 K/UL (ref 0–0.01)
NRBC # BLD: 0 K/UL (ref 0–0.01)
NRBC BLD-RTO: 0 PER 100 WBC
NRBC BLD-RTO: 0 PER 100 WBC
PDF IMAGE, 910387: NORMAL
PH UR STRIP: 5.5 [PH] (ref 4.6–8)
PH UR STRIP: 5.5 [PH] (ref 4.6–8)
PH UR STRIP: 7 [PH] (ref 4.6–8)
PH UR STRIP: 7.5 [PH] (ref 5–8)
PLATELET # BLD AUTO: 255 X10E3/UL (ref 150–379)
PLATELET # BLD AUTO: 285 K/UL (ref 150–400)
PLATELET # BLD AUTO: 290 K/UL (ref 150–400)
PLATELET # BLD: NORMAL 10*3/UL
PMV BLD AUTO: 10.3 FL (ref 8.9–12.9)
PMV BLD AUTO: 10.3 FL (ref 8.9–12.9)
POTASSIUM SERPL-SCNC: 3.4 MMOL/L (ref 3.5–5.1)
POTASSIUM SERPL-SCNC: 3.8 MMOL/L (ref 3.5–5.1)
POTASSIUM SERPL-SCNC: 4 MMOL/L (ref 3.5–5.2)
POTASSIUM SERPL-SCNC: 4.6 MMOL/L (ref 3.5–5.2)
PROT SERPL-MCNC: 7.1 G/DL (ref 6–8.5)
PROT SERPL-MCNC: 7.3 G/DL (ref 6.4–8.2)
PROT SERPL-MCNC: 8 G/DL (ref 6.4–8.2)
PROT SERPL-MCNC: 8.3 G/DL (ref 6–8.5)
PROT UR QL STRIP: NEGATIVE
PROT UR QL STRIP: NORMAL
PROT UR QL STRIP: NORMAL
PROT UR STRIP-MCNC: 100 MG/DL
RBC # BLD AUTO: 3.67 M/UL (ref 3.8–5.2)
RBC # BLD AUTO: 4.17 M/UL (ref 3.8–5.2)
RBC # BLD AUTO: 4.42 X10E6/UL (ref 3.77–5.28)
RBC # BLD: NORMAL 10*6/UL
RBC #/AREA URNS HPF: ABNORMAL /HPF (ref 0–5)
RBC UA POCT, RBCPOCT: NORMAL
SERVICE CMNT-IMP: ABNORMAL
SERVICE CMNT-IMP: NORMAL
SODIUM SERPL-SCNC: 136 MMOL/L (ref 136–145)
SODIUM SERPL-SCNC: 137 MMOL/L (ref 136–145)
SODIUM SERPL-SCNC: 138 MMOL/L (ref 134–144)
SODIUM SERPL-SCNC: 139 MMOL/L (ref 134–144)
SP GR UR REFRACTOMETRY: 1.01 (ref 1–1.03)
SP GR UR STRIP: 1.01 (ref 1–1.03)
SP GR UR STRIP: 1.02 (ref 1–1.03)
SP GR UR STRIP: 1.02 (ref 1–1.03)
TRICH UA POCT, TRICHPOC: NORMAL
TRIGL SERPL-MCNC: 86 MG/DL (ref 0–149)
UA UROBILINOGEN AMB POC: NORMAL (ref 0.2–1)
UA: UC IF INDICATED,UAUC: ABNORMAL
URINALYSIS CLARITY POC: CLEAR
URINALYSIS CLARITY POC: NORMAL
URINALYSIS CLARITY POC: NORMAL
URINALYSIS COLOR POC: NORMAL
URINALYSIS COLOR POC: YELLOW
URINALYSIS COLOR POC: YELLOW
URINE BLOOD POC: NORMAL
URINE CULT COMMENT, POCT: NORMAL
URINE LEUKOCYTES POC: NEGATIVE
URINE LEUKOCYTES POC: NORMAL
URINE LEUKOCYTES POC: NORMAL
URINE NITRITES POC: NEGATIVE
URINE NITRITES POC: NEGATIVE
URINE NITRITES POC: POSITIVE
UROBILINOGEN UR QL STRIP.AUTO: 0.2 EU/DL (ref 0.2–1)
VLDLC SERPL CALC-MCNC: 17 MG/DL (ref 5–40)
WBC # BLD AUTO: 11 X10E3/UL (ref 3.4–10.8)
WBC # BLD AUTO: 15.9 K/UL (ref 3.6–11)
WBC # BLD AUTO: 19.3 K/UL (ref 3.6–11)
WBC # BLD: NORMAL 10*3/UL
WBC UA POCT, WBCPOCT: NORMAL
WBC URNS QL MICRO: ABNORMAL /HPF (ref 0–4)
YEAST UA POCT, YEASTPOC: NORMAL

## 2019-01-01 PROCEDURE — 99283 EMERGENCY DEPT VISIT LOW MDM: CPT

## 2019-01-01 PROCEDURE — 87040 BLOOD CULTURE FOR BACTERIA: CPT

## 2019-01-01 PROCEDURE — 80053 COMPREHEN METABOLIC PANEL: CPT

## 2019-01-01 PROCEDURE — 36415 COLL VENOUS BLD VENIPUNCTURE: CPT

## 2019-01-01 PROCEDURE — 74011250636 HC RX REV CODE- 250/636: Performed by: EMERGENCY MEDICINE

## 2019-01-01 PROCEDURE — 87186 SC STD MICRODIL/AGAR DIL: CPT

## 2019-01-01 PROCEDURE — 74011000258 HC RX REV CODE- 258: Performed by: EMERGENCY MEDICINE

## 2019-01-01 PROCEDURE — 74011250637 HC RX REV CODE- 250/637: Performed by: EMERGENCY MEDICINE

## 2019-01-01 PROCEDURE — 96375 TX/PRO/DX INJ NEW DRUG ADDON: CPT

## 2019-01-01 PROCEDURE — 96365 THER/PROPH/DIAG IV INF INIT: CPT

## 2019-01-01 PROCEDURE — 81001 URINALYSIS AUTO W/SCOPE: CPT

## 2019-01-01 PROCEDURE — 83605 ASSAY OF LACTIC ACID: CPT

## 2019-01-01 PROCEDURE — 87086 URINE CULTURE/COLONY COUNT: CPT

## 2019-01-01 PROCEDURE — 85025 COMPLETE CBC W/AUTO DIFF WBC: CPT

## 2019-01-01 PROCEDURE — 99285 EMERGENCY DEPT VISIT HI MDM: CPT

## 2019-01-01 PROCEDURE — 87077 CULTURE AEROBIC IDENTIFY: CPT

## 2019-01-01 RX ORDER — ACETAMINOPHEN 500 MG
1000 TABLET ORAL ONCE
Status: COMPLETED | OUTPATIENT
Start: 2019-01-01 | End: 2019-01-01

## 2019-01-01 RX ORDER — CEPHALEXIN 500 MG/1
500 CAPSULE ORAL 4 TIMES DAILY
Qty: 28 CAP | Refills: 0 | Status: SHIPPED | OUTPATIENT
Start: 2019-01-01 | End: 2019-01-01

## 2019-01-01 RX ORDER — POTASSIUM CHLORIDE 20 MEQ/1
40 TABLET, EXTENDED RELEASE ORAL
Status: COMPLETED | OUTPATIENT
Start: 2019-01-01 | End: 2019-01-01

## 2019-01-01 RX ORDER — FENTANYL CITRATE 50 UG/ML
25 INJECTION, SOLUTION INTRAMUSCULAR; INTRAVENOUS
Status: COMPLETED | OUTPATIENT
Start: 2019-01-01 | End: 2019-01-01

## 2019-01-01 RX ORDER — LEVOFLOXACIN 750 MG/1
750 TABLET ORAL DAILY
Qty: 10 TAB | Refills: 0 | Status: SHIPPED | OUTPATIENT
Start: 2019-01-01 | End: 2019-01-01

## 2019-01-01 RX ADMIN — CEFTRIAXONE 1 G: 1 INJECTION, POWDER, FOR SOLUTION INTRAMUSCULAR; INTRAVENOUS at 00:36

## 2019-01-01 RX ADMIN — FENTANYL CITRATE 25 MCG: 50 INJECTION INTRAMUSCULAR; INTRAVENOUS at 00:39

## 2019-01-01 RX ADMIN — SODIUM CHLORIDE 1000 ML: 900 INJECTION, SOLUTION INTRAVENOUS at 17:34

## 2019-01-01 RX ADMIN — POTASSIUM CHLORIDE 40 MEQ: 20 TABLET, EXTENDED RELEASE ORAL at 17:34

## 2019-01-01 RX ADMIN — SODIUM CHLORIDE 1000 ML: 900 INJECTION, SOLUTION INTRAVENOUS at 00:35

## 2019-01-01 RX ADMIN — ACETAMINOPHEN 1000 MG: 500 TABLET ORAL at 00:42

## 2019-05-15 NOTE — PROGRESS NOTES
HISTORY OF PRESENT ILLNESS Mray Osorio is a 80 y.o. female. HPI  Patient comes in today for AWV Last visit 18. Jus Mac. Pt has appointment coming up. Does own housework and cooking. Son lives with her. Doesn't walk the distance she used to, uses cane for ambulation, uses rollator when going to be walking distances. Patient states compliance with BP medications.  Denies any problems with medications.  Patient states she took medications this morning.  Denies chest pains, palpitations, dyspnea, dizziness, tingling in extremities, headache.  Appetite good.  Urinating without difficulty. Sleeping well at night. Will have a cat nap during day if she is watching TV. Daughter with patient today. No concerns about memory. No Known Allergies Past Medical History:  
Diagnosis Date  Disc disease, degenerative, lumbar or lumbosacral   
 HTN (hypertension) 2010  Hypercholesteremia 2010  Osteoporosis, age related Past Surgical History:  
Procedure Laterality Date [address] UNLISTED  ENDOSCOPY, COLON, DIAGNOSTIC    
 HX CATARACT REMOVAL    
 HX GYN Social History Socioeconomic History  Marital status:  Spouse name: Not on file  Number of children: Not on file  Years of education: Not on file  Highest education level: Not on file Occupational History  Not on file Social Needs  Financial resource strain: Not on file  Food insecurity:  
  Worry: Not on file Inability: Not on file  Transportation needs:  
  Medical: Not on file Non-medical: Not on file Tobacco Use  Smoking status: Former Smoker Last attempt to quit: 5/3/1986 Years since quittin.0  Smokeless tobacco: Never Used Substance and Sexual Activity  Alcohol use: Yes Comment: wine occas  Drug use: No  
 Sexual activity: Not Currently Lifestyle  Physical activity: Days per week: Not on file Minutes per session: Not on file  Stress: Not on file Relationships  Social connections:  
  Talks on phone: Not on file Gets together: Not on file Attends Druze service: Not on file Active member of club or organization: Not on file Attends meetings of clubs or organizations: Not on file Relationship status: Not on file  Intimate partner violence:  
  Fear of current or ex partner: Not on file Emotionally abused: Not on file Physically abused: Not on file Forced sexual activity: Not on file Other Topics Concern  Not on file Social History Narrative  Not on file Family History Problem Relation Age of Onset  Heart Disease Mother  Cancer Father   
     colon  No Known Problems Sister  No Known Problems Brother Current Outpatient Medications Medication Sig  
 lisinopril (PRINIVIL, ZESTRIL) 20 mg tablet TAKE 1 TABLET BY MOUTH ONCE DAILY  simvastatin (ZOCOR) 20 mg tablet TAKE 1 TABLET BY MOUTH ONCE DAILY IN THE EVENING  
 amLODIPine (NORVASC) 10 mg tablet TAKE 1 TABLET BY MOUTH ONCE DAILY  hydroCHLOROthiazide (HYDRODIURIL) 12.5 mg tablet TAKE 1 TABLET BY MOUTH ONCE DAILY  polyethylene glycol (MIRALAX) 17 gram/dose powder DILUTE ONE CAPFULL IN WATER AS DIRECTED DAILY  acetaminophen (TYLENOL ARTHRITIS) 650 mg CR tablet Take 1,300 mg by mouth every twelve (12) hours as needed.  GLUC HCL/CSA/NGOZI HY/HYALUR AC (AOSPSXTY-KUSE-UHABNS-HYALUR AC PO) Take  by mouth.  calcium-cholecalciferol, D3, (CALCARB 600 WITH VITAMIN D) tablet Take 1 Tab by mouth daily.  vit a,c & e-lutein-minerals (OCUVITE) tablet daily.  omega-3 fatty acids-vitamin e (FISH OIL) 1,000 mg Cap Take 1 Cap by mouth.  aspirin delayed-release 81 mg tablet Take  by mouth daily. No current facility-administered medications for this visit. Review of Systems Constitutional: Negative for chills, fever, malaise/fatigue and weight loss. Respiratory: Negative for cough and shortness of breath. Cardiovascular: Negative for chest pain, palpitations and leg swelling. Gastrointestinal: Negative for abdominal pain, nausea and vomiting. Genitourinary: Positive for frequency (nocturia). Negative for dysuria, flank pain, hematuria and urgency. Musculoskeletal: Negative for back pain, joint pain and myalgias. Skin: Negative. Neurological: Negative for dizziness, tingling, sensory change, speech change, focal weakness and headaches. Psychiatric/Behavioral: Negative for depression. The patient is not nervous/anxious and does not have insomnia. Vitals:  
 05/15/19 9556 BP: 149/70 Pulse: 79 Resp: 18 Temp: 95.7 °F (35.4 °C) TempSrc: Oral  
SpO2: 99% Weight: 123 lb 6.4 oz (56 kg) Height: 5' 3\" (1.6 m) Physical Exam  
Constitutional: She is oriented to person, place, and time. She appears well-developed and well-nourished. She is cooperative. Neck: Carotid bruit is not present. No thyromegaly present. Cardiovascular: Normal rate, regular rhythm and normal heart sounds. Pulses: 
     Radial pulses are 2+ on the right side, and 2+ on the left side. Dorsalis pedis pulses are 2+ on the right side, and 2+ on the left side. Pulmonary/Chest: Effort normal.  
Abdominal: Soft. Normal appearance and bowel sounds are normal. There is no tenderness. Neurological: She is alert and oriented to person, place, and time. Skin: Skin is warm and dry. Psychiatric: She has a normal mood and affect. Her behavior is normal. Judgment and thought content normal.  
Vitals reviewed. ASSESSMENT and PLAN 
  ICD-10-CM ICD-9-CM 1. Essential hypertension I10 401.9 CBC WITH AUTOMATED DIFF  
   METABOLIC PANEL, COMPREHENSIVE  
   AMB POC URINALYSIS DIP STICK AUTO W/ MICRO 2.  Hyperlipidemia, unspecified hyperlipidemia type Z24.9 951.7 METABOLIC PANEL, COMPREHENSIVE  
   LIPID PANEL 3. Prediabetes R73.03 790.29 AMB POC HEMOGLOBIN A1C  
4. Macular degeneration, unspecified laterality, unspecified type H35.30 362.50   
5. Medicare annual wellness visit, subsequent Z00.00 V70.0 Encounter Diagnoses Name Primary?  Essential hypertension Yes  Hyperlipidemia, unspecified hyperlipidemia type  Prediabetes  Macular degeneration, unspecified laterality, unspecified type  Medicare annual wellness visit, subsequent  Abnormal finding in urine Orders Placed This Encounter  CULTURE, URINE  CBC WITH AUTOMATED DIFF  
 METABOLIC PANEL, COMPREHENSIVE  LIPID PANEL  
 AMB POC HEMOGLOBIN A1C  
 AMB POC URINALYSIS DIP STICK AUTO W/ MICRO Diagnoses and all orders for this visit: 1. Essential hypertension - stable. Continue current meds 
-     CBC WITH AUTOMATED DIFF 
-     METABOLIC PANEL, COMPREHENSIVE 
-     AMB POC URINALYSIS DIP STICK AUTO W/ MICRO 2. Hyperlipidemia, unspecified hyperlipidemia type - on simvastatin. Will check FLP 
-     METABOLIC PANEL, COMPREHENSIVE 
-     LIPID PANEL 3. Prediabetes -     AMB POC HEMOGLOBIN A1C 
 
4. Macular degeneration, unspecified laterality, unspecified type - due to see Dr. Radha Vaughn in near future. 5. Medicare annual wellness visit, subsequent 7.  abnormal finding in urine -     URINE CULTURE Follow-up and Dispositions · Return in about 1 year (around 5/15/2020). lab results and schedule of future lab studies reviewed with patient 
cardiovascular risk and specific lipid/LDL goals reviewed I have reviewed the patient's allergies and made any necessary changes. Medical, procedural, social and family histories have been reviewed and updated as medically indicated. I have reconciled and/or revised patient medications in the EMR.     
 
I have discussed each diagnosis listed in this note with Misty Johnson Cris and/or their family. I have discussed treatment options and the risk/benefit analysis of those options, including safe use of medications and possible medication side effects. Through the use of shared decision making we have agreed to the above plan. The patient has received an after-visit summary and questions were answered concerning future plans. Olivia Brooks, FNP-C This note will not be viewable in [address]

## 2019-05-15 NOTE — PROGRESS NOTES
Chief Complaint Patient presents with [address] [place] [address] Annual Wellness Visit 1. Have you been to the ER, urgent care clinic since your last visit? Hospitalized since your last visit? No 
 
2. Have you seen or consulted any other health care providers outside of the [address] [place] [address] since your last visit? Include any pap smears or colon screening. No  
 
Eye Exam - Dr. Yanira Kowalski. Pt has appointment coming up. Health Maintenance Due Topic Date Due  Shingrix Vaccine Age 50> (1 of 2) 07/03/1974  Pneumococcal 65+ years (1 of 2 - PCV13) 07/03/1989  MEDICARE YEARLY EXAM  06/30/2018  GLAUCOMA SCREENING Q2Y  08/23/2018

## 2019-05-15 NOTE — PROGRESS NOTES
Meenakshi Pate is a 80 y.o. female who presents for a Medicare Subsequent Annual Wellness Exam and follow up of chronic medical conditions. I have reviewed the patient's medical history in detail and updated the computerized patient record. History Patient Active Problem List  
 Diagnosis  Arthritis of both knees  History of appendectomy  Essential hypertension  Chronic back pain  Macular degeneration  At risk for osteoporosis/osteopenia  Encounter for long-term (current) use of other medications  Hypercholesteremia Patient Care Team: 
Sonya Parada NP as PCP - General (Nurse Practitioner) Rigo Wetzel MD as Physician (Optometry) Past Medical History:  
Diagnosis Date  Disc disease, degenerative, lumbar or lumbosacral   
 HTN (hypertension) 4/9/2010  Hypercholesteremia 4/9/2010  Osteoporosis, age related Past Surgical History:  
Procedure Laterality Date [address] UNLISTED  ENDOSCOPY, COLON, DIAGNOSTIC    
 HX CATARACT REMOVAL    
 HX GYN Current Outpatient Medications Medication Sig Dispense Refill  lisinopril (PRINIVIL, ZESTRIL) 20 mg tablet TAKE 1 TABLET BY MOUTH ONCE DAILY 90 Tab 3  
 simvastatin (ZOCOR) 20 mg tablet TAKE 1 TABLET BY MOUTH ONCE DAILY IN THE EVENING 90 Tab 3  
 amLODIPine (NORVASC) 10 mg tablet TAKE 1 TABLET BY MOUTH ONCE DAILY 90 Tab 3  
 hydroCHLOROthiazide (HYDRODIURIL) 12.5 mg tablet TAKE 1 TABLET BY MOUTH ONCE DAILY 90 Tab 3  polyethylene glycol (MIRALAX) 17 gram/dose powder DILUTE ONE CAPFULL IN WATER AS DIRECTED DAILY 765 g 1  
 acetaminophen (TYLENOL ARTHRITIS) 650 mg CR tablet Take 1,300 mg by mouth every twelve (12) hours as needed.  GLUC HCL/CSA/NGOZI HY/HYALUR AC (PTITRQHJ-ISHA-WZXZFM-HYALUR AC PO) Take  by mouth.  calcium-cholecalciferol, D3, (CALCARB 600 WITH VITAMIN D) tablet Take 1 Tab by mouth daily.  vit a,c & e-lutein-minerals (OCUVITE) tablet daily.  omega-3 fatty acids-vitamin e (FISH OIL) 1,000 mg Cap Take 1 Cap by mouth.  aspirin delayed-release 81 mg tablet Take  by mouth daily. No Known Allergies Family History Problem Relation Age of Onset  Heart Disease Mother  Cancer Father   
     colon  No Known Problems Sister  No Known Problems Brother Social History Tobacco Use  Smoking status: Former Smoker Last attempt to quit: 5/3/1986 Years since quittin.0  Smokeless tobacco: Never Used Substance Use Topics  Alcohol use: Yes Comment: wine occas Alcohol Risk Factor Screening: You do not drink alcohol or very rarely. Review of Systems: 
 
Functional Ability and Level of Safety:  
Hearing Loss Hearing is good. Whisper Test done with normal results. Activities of Daily Living ADL Assessment 5/15/2019 Feeding yourself No Help Needed Getting from bed to chair No Help Needed Getting dressed No Help Needed Bathing or showering No Help Needed Walk across the room (includes cane/walker) No Help Needed Using the telphone No Help Needed Taking your medications No Help Needed Preparing meals No Help Needed Managing money (expenses/bills) Help Needed Moderately strenuous housework (laundry) No Help Needed Shopping for personal items (toiletries/medicines) Help Needed Shopping for groceries Help Needed Driving Help Needed Climbing a flight of stairs Help Needed Getting to places beyond walking distances Help Needed Fall Risk Fall Risk Assessment, last 12 mths 5/15/2019 Able to walk? Yes Fall in past 12 months? No  
 
 
Abuse Screen Abuse Screening Questionnaire 5/15/2019 Do you ever feel afraid of your partner? Suze Mota Are you in a relationship with someone who physically or mentally threatens you? Suze Mota Is it safe for you to go home? Pancho Bernard Cognitive Screening Evaluation of Cognitive Function: Has your family/caregiver stated any concerns about your memory: no 
 
Depression Risk Factor Screening:  
 
3 most recent PHQ Screens 5/15/2019 Little interest or pleasure in doing things Not at all Feeling down, depressed, irritable, or hopeless Not at all Total Score PHQ 2 0 Physical Exam  
 
Visit Vitals /70 (BP 1 Location: Right arm, BP Patient Position: Sitting) Pulse 79 Temp 95.7 °F (35.4 °C) (Oral) Resp 18 Ht 5' 3\" (1.6 m) Wt 123 lb 6.4 oz (56 kg) SpO2 99% BMI 21.86 kg/m² Assessment/Plan [address] education and counseling provided: 
Age appropriate evidence-based preventive care recommendations based on today's review and evaluation; including relevant cancer screening guidelines, and vaccination recommendations. An After Visit Summary was printed and given to the patient which information about these guidelines, and a personalized schedule for health maintenance items. Whe appropriate and with patient agreement, orders noted below were placed to complete missing health maintenance items. Additional Plan for follow up chronic medical conditions includes: 
Diagnoses and all orders for this visit: 1. Essential hypertension 
-     CBC WITH AUTOMATED DIFF 
-     METABOLIC PANEL, COMPREHENSIVE 
-     AMB POC URINALYSIS DIP STICK AUTO W/ MICRO 2. Hyperlipidemia, unspecified hyperlipidemia type -     METABOLIC PANEL, COMPREHENSIVE 
-     LIPID PANEL 3. Prediabetes -     AMB POC HEMOGLOBIN A1C 
 
4. Macular degeneration, unspecified laterality, unspecified type 5. Medicare annual wellness visit, subsequent 6. Abnormal finding in urine 
-     CULTURE, URINE Health Maintenance Due Topic Date Due  MEDICARE YEARLY EXAM  06/30/2018  GLAUCOMA SCREENING Q2Y  08/23/2018 Celestino Nassar NP

## 2019-05-15 NOTE — PATIENT INSTRUCTIONS

## 2019-05-23 NOTE — PROGRESS NOTES
RECOMMENDATIONS: 
All labs are normal/stable. Cholesterol looks wonderful. Keep up the good work. No change in medications. Urine culture negative.

## 2019-10-28 NOTE — ED TRIAGE NOTES
Pt arrived via EMS with lower abd pain, starting at 5pm today. Daughter states she pt has been more incontinent today than usual.  Denies N/V/D. Pt states she has not taking her evening medications. Pt is hypertensive PTA.

## 2019-10-28 NOTE — ED PROVIDER NOTES
EMERGENCY DEPARTMENT HISTORY AND PHYSICAL EXAM  
 
---------------------------------------------------------------------------- Please note that this dictation was completed with [employer]axer, the computer voice recognition software. Quite often unanticipated grammatical, syntax, homophones, and other interpretive errors are inadvertently transcribed by the computer software. Please disregard these errors. Please excuse any errors that have escaped final proofreading 
---------------------------------------------------------------------------- 
 
 
Date: 10/27/2019 Patient Name: Cami Green History of Presenting Illness Chief Complaint Patient presents with  Abdominal Pain  
  arrives via EMS; acute onset today at 1900; lower groin with urinary incontinence; denies n/v/d; pain improves when laying flat  Hypertension  
  has NOT taken medication today, asymptomatic History Provided By:  Patient and family HPI: Cami Green is a 80 y.o. female, with significant pmhx of HTN, who presents via EMS to the ED with c/o pelvic pain that is radiating to bilateral groins of cramping sensation that started earlier this afternoon. Patient reports having ongoing issues with constipation although had bowel movement yesterday. Also complaining urinary symptoms with urgency/incontinence. History of recurrent bladder infections in the past.  Denies associated fever, nausea or vomiting. Family reports that she lives by herself and that the pain has become so severe she could not sit up or walk. EMS had to pick her up and place her on the stretcher due to severe pain. Patient specifically denies any associated fevers, chills, nausea, vomiting, diarrhea, CP, SOB,  changes in BM, or headache. Social Hx: denies tobacco  denies EtOH , denies Illicit Drugs There are no other complaints, changes, or physical findings at this time. PCP: Christiane Sepulveda NP No Known Allergies Current Outpatient Medications Medication Sig Dispense Refill  cephALEXin (KEFLEX) 500 mg capsule Take 1 Cap by mouth four (4) times daily for 7 days. 28 Cap 0  
 lisinopril (PRINIVIL, ZESTRIL) 20 mg tablet TAKE 1 TABLET BY MOUTH ONCE DAILY 90 Tab 3  
 simvastatin (ZOCOR) 20 mg tablet TAKE 1 TABLET BY MOUTH ONCE DAILY IN THE EVENING 90 Tab 3  
 amLODIPine (NORVASC) 10 mg tablet TAKE 1 TABLET BY MOUTH ONCE DAILY 90 Tab 3  
 hydroCHLOROthiazide (HYDRODIURIL) 12.5 mg tablet TAKE 1 TABLET BY MOUTH ONCE DAILY 90 Tab 3  polyethylene glycol (MIRALAX) 17 gram/dose powder DILUTE ONE CAPFULL IN WATER AS DIRECTED DAILY 765 g 1  
 acetaminophen (TYLENOL ARTHRITIS) 650 mg CR tablet Take 1,300 mg by mouth every twelve (12) hours as needed.  GLUC HCL/CSA/NGOZI HY/HYALUR AC (MVDCAAAR-DGFR-TYIEKU-HYALUR AC PO) Take  by mouth.  calcium-cholecalciferol, D3, (CALCARB 600 WITH VITAMIN D) tablet Take 1 Tab by mouth daily.  vit a,c & e-lutein-minerals (OCUVITE) tablet daily.  aspirin delayed-release 81 mg tablet Take  by mouth daily.  omega-3 fatty acids-vitamin e (FISH OIL) 1,000 mg Cap Take 1 Cap by mouth. Past History Past Medical History: 
Past Medical History:  
Diagnosis Date  Disc disease, degenerative, lumbar or lumbosacral   
 HTN (hypertension) 2010  Hypercholesteremia 2010  Osteoporosis, age related Past Surgical History: 
Past Surgical History:  
Procedure Laterality Date [address] UNLISTED  ENDOSCOPY, COLON, DIAGNOSTIC    
 HX CATARACT REMOVAL    
 HX GYN Family History: 
Family History Problem Relation Age of Onset  Heart Disease Mother  Cancer Father   
     colon  No Known Problems Sister  No Known Problems Brother Social History: 
Social History Tobacco Use  Smoking status: Former Smoker Last attempt to quit: 5/3/1986 Years since quittin.5  Smokeless tobacco: Never Used Substance Use Topics  Alcohol use: Yes Comment: wine occas  Drug use: No  
 
 
Allergies: 
No Known Allergies Review of Systems Review of Systems Constitutional: Negative. Negative for fever. Eyes: Negative. Respiratory: Negative. Negative for shortness of breath. Cardiovascular: Negative for chest pain. Gastrointestinal: Positive for abdominal pain. Negative for nausea and vomiting. Endocrine: Negative. Genitourinary: Positive for urgency. Negative for difficulty urinating, dysuria and hematuria. Musculoskeletal: Negative. Skin: Negative. Neurological: Negative. Psychiatric/Behavioral: Negative for suicidal ideas. All other systems reviewed and are negative. Physical Exam  
Physical Exam  
Constitutional: She is oriented to person, place, and time. She appears well-developed and well-nourished. No distress. HENT:  
Head: Normocephalic and atraumatic. Nose: Nose normal.  
Eyes: Conjunctivae and EOM are normal. No scleral icterus. Neck: Normal range of motion. No tracheal deviation present. Cardiovascular: Regular rhythm, normal heart sounds and intact distal pulses. Tachycardia present. Exam reveals no friction rub. No murmur heard. Pulmonary/Chest: Effort normal and breath sounds normal. No stridor. No respiratory distress. She has no wheezes. She has no rales. Abdominal: Soft. Bowel sounds are normal. She exhibits no distension. There is tenderness in the suprapubic area. There is no rigidity, no rebound and no guarding. Musculoskeletal: Normal range of motion. She exhibits no tenderness. Neurological: She is alert and oriented to person, place, and time. No cranial nerve deficit. Skin: Skin is warm and dry. No rash noted. She is not diaphoretic. Psychiatric: She has a normal mood and affect.  Her speech is normal and behavior is normal. Judgment and thought content normal. Cognition and memory are normal.  
 Nursing note and vitals reviewed. Diagnostic Study Results Labs - Recent Results (from the past 12 hour(s)) CBC WITH AUTOMATED DIFF Collection Time: 10/27/19  9:46 PM  
Result Value Ref Range WBC 15.9 (H) 3.6 - 11.0 K/uL  
 RBC 4.17 3.80 - 5.20 M/uL  
 HGB 12.5 11.5 - 16.0 g/dL HCT 38.7 35.0 - 47.0 % MCV 92.8 80.0 - 99.0 FL  
 MCH 30.0 26.0 - 34.0 PG  
 MCHC 32.3 30.0 - 36.5 g/dL  
 RDW 13.0 11.5 - 14.5 % PLATELET 314 454 - 468 K/uL MPV 10.3 8.9 - 12.9 FL  
 NRBC 0.0 0  WBC ABSOLUTE NRBC 0.00 0.00 - 0.01 K/uL NEUTROPHILS 71 32 - 75 % LYMPHOCYTES 22 12 - 49 % MONOCYTES 6 5 - 13 % EOSINOPHILS 0 0 - 7 % BASOPHILS 0 0 - 1 % IMMATURE GRANULOCYTES 1 (H) 0.0 - 0.5 % ABS. NEUTROPHILS 11.3 (H) 1.8 - 8.0 K/UL  
 ABS. LYMPHOCYTES 3.5 0.8 - 3.5 K/UL  
 ABS. MONOCYTES 0.9 0.0 - 1.0 K/UL  
 ABS. EOSINOPHILS 0.1 0.0 - 0.4 K/UL  
 ABS. BASOPHILS 0.1 0.0 - 0.1 K/UL  
 ABS. IMM. GRANS. 0.1 (H) 0.00 - 0.04 K/UL  
 DF AUTOMATED METABOLIC PANEL, COMPREHENSIVE Collection Time: 10/27/19  9:46 PM  
Result Value Ref Range Sodium 136 136 - 145 mmol/L Potassium 3.8 3.5 - 5.1 mmol/L Chloride 103 97 - 108 mmol/L  
 CO2 23 21 - 32 mmol/L Anion gap 10 5 - 15 mmol/L Glucose 148 (H) 65 - 100 mg/dL BUN 16 6 - 20 MG/DL Creatinine 0.73 0.55 - 1.02 MG/DL  
 BUN/Creatinine ratio 22 (H) 12 - 20 GFR est AA >60 >60 ml/min/1.73m2 GFR est non-AA >60 >60 ml/min/1.73m2 Calcium 9.0 8.5 - 10.1 MG/DL Bilirubin, total 0.5 0.2 - 1.0 MG/DL  
 ALT (SGPT) 17 12 - 78 U/L  
 AST (SGOT) 32 15 - 37 U/L Alk. phosphatase 70 45 - 117 U/L Protein, total 8.0 6.4 - 8.2 g/dL Albumin 3.4 (L) 3.5 - 5.0 g/dL Globulin 4.6 (H) 2.0 - 4.0 g/dL A-G Ratio 0.7 (L) 1.1 - 2.2 URINALYSIS W/ REFLEX CULTURE Collection Time: 10/27/19 10:52 PM  
Result Value Ref Range Color YELLOW/STRAW Appearance CLEAR CLEAR  Specific gravity 1.014 1.003 - 1.030    
 pH (UA) 7.5 5.0 - 8.0 Protein 100 (A) NEG mg/dL Glucose NEGATIVE  NEG mg/dL Ketone 15 (A) NEG mg/dL Bilirubin NEGATIVE  NEG Blood MODERATE (A) NEG Urobilinogen 0.2 0.2 - 1.0 EU/dL Nitrites POSITIVE (A) NEG Leukocyte Esterase TRACE (A) NEG    
 WBC 10-20 0 - 4 /hpf  
 RBC 10-20 0 - 5 /hpf Epithelial cells FEW FEW /lpf Bacteria 4+ (A) NEG /hpf  
 UA:UC IF INDICATED URINE CULTURE ORDERED (A) CNI Hyaline cast 0-2 0 - 5 /lpf POC LACTIC ACID Collection Time: 10/28/19 12:29 AM  
Result Value Ref Range Lactic Acid (POC) 0.91 0.40 - 2.00 mmol/L Radiologic Studies - No orders to display CT Results  (Last 48 hours) None CXR Results  (Last 48 hours) None Medical Decision Making I am the first provider for this patient. I reviewed the vital signs, available nursing notes, past medical history, past surgical history, family history and social history. Vital Signs-Reviewed the patient's vital signs. Patient Vitals for the past 12 hrs: 
 Temp Pulse Resp BP SpO2  
10/28/19 0230    139/84 95 % 10/28/19 0215     94 % 10/28/19 0200    148/67 96 % 10/28/19 0145     95 % 10/28/19 0130    172/72 96 % 10/27/19 2330    173/77 96 % 10/27/19 2230    177/72 96 % 10/27/19 2200    (!) 186/97 96 % 10/27/19 2125 98.3 °F (36.8 °C) (!) 115 16 (!) 215/81 98 % Pulse Oximetry Analysis - 96% on RA Cardiac Monitor:  
Rate: 115 bpm 
Rhythm: sinus tach Records Reviewed: Nursing Notes and Old Medical Records Provider Notes (Medical Decision Making): DDX: 
Uti, constipation, sbo, sepsis, dehydration Plan: 
Labs, ua, lactate, cultures Impression: UTI 
 
ED Course:  
Initial assessment performed. The patients presenting problems have been discussed, and they are in agreement with the care plan formulated and outlined with them.   I have encouraged them to ask questions as they arise throughout their visit. I reviewed our electronic medical record system for any past medical records that were available that may contribute to the patients current condition, the nursing notes and and vital signs from today's visit Nursing notes will be reviewed as they become available in realtime while the pt has been in the ED. Maya Castellanos MD 
 
I personally reviewed pt's imaging. Official read by radiology noted above. Maya Castellanos MD 
 
PROGRESS NOTE: 
1:24 AM 
Pt noted to have significant findings on urine I labs. Also with elevated white blood cell count. Lactate normal.  Blood culture sent and ceftriaxone started. Have provided analgesic. Will reevaluate patient for symptom relief and able to ambulate after medications provided. Is able to ambulate feeling better will plan for discharge home with antibiotics if not we will plan for admission. Maya Castellanos MD 
 
 
3:08 AM 
Progress note: 
Pt noted to be feeling better, to get up and ambulate without issue ready for discharge. Discussed lab and imaging findings with pt and/or family, specifically noting UTI. Pt will follow up with pcp as instructed. All questions have been answered, pt voiced understanding and agreement with plan. Abx were prescribed, pt advised that diarrhea and rash are possible side effects of the medications. Specific return precautions provided in addition to instructions for pt to return to the ED immediately should sx worsen at any time. Maya Castellanos MD 
 
 
  
 
Critical Care Time:  
 
none Diagnosis Clinical Impression: 1. Acute cystitis without hematuria 2. Pelvic pain PLAN: 
1. Current Discharge Medication List  
  
START taking these medications Details  
cephALEXin (KEFLEX) 500 mg capsule Take 1 Cap by mouth four (4) times daily for 7 days. Qty: 28 Cap, Refills: 0  
  
  
 
2. Follow-up Information Follow up With Specialties Details Why Contact Info Wendy Gómez NP Nurse Practitioner Schedule an appointment as soon as possible for a visit in 2 days  [address] [facility] [id number] 
[phone] Return to ED if worse Disposition: 
3:08 AM 
The patient's results have been reviewed with family and/or caregiver. They verbally convey their understanding and agreement of the patient's signs, symptoms, diagnosis, treatment and prognosis and additionally agree to follow up as recommended in the discharge instructions or to return to the Emergency Room should the patient's condition change prior to their follow-up appointment. The family and/or caregiver verbally agrees with the care-plan and all of their questions have been answered. The discharge instructions have also been provided to the them with educational information regarding the patient's diagnosis as well a list of reasons why the patient would want to return to the ER prior to their follow-up appointment should their condition change. Danielle Clay MD 
 
 
 
 
 
This note will not be viewable in [address]

## 2019-10-28 NOTE — ED NOTES
Patient discharged by Anayeli Haskins MD. Patient provided with discharge instructions Rx and instructions on follow up care. Patient out of ED via wheelchair accompanied by daughter and ED staff.

## 2019-10-28 NOTE — DISCHARGE INSTRUCTIONS
Pelvic Pain: Care Instructions  Your Care Instructions    Pelvic pain, or pain in the lower belly, can have many causes. Often pelvic pain is not serious and gets better in a few days. If your pain continues or gets worse, you may need tests and treatment. Tell your doctor about any new symptoms. These may be signs of a serious problem. Follow-up care is a key part of your treatment and safety. Be sure to make and go to all appointments, and call your doctor if you are having problems. It's also a good idea to know your test results and keep a list of the medicines you take. How can you care for yourself at home? · Rest until you feel better. Lie down, and raise your legs by placing a pillow under your knees. · Drink plenty of fluids. You may find that small, frequent sips are easier on your stomach than if you drink a lot at once. Avoid drinks with carbonation or caffeine, such as soda pop, tea, or coffee. · Try eating several small meals instead of 2 or 3 large ones. Eat mild foods, such as rice, dry toast or crackers, bananas, and applesauce. Avoid fatty and spicy foods, other fruits, and alcohol until 48 hours after your symptoms have gone away. · Take an over-the-counter pain medicine, such as acetaminophen (Tylenol), ibuprofen (Advil, Motrin), or naproxen (Aleve). Read and follow all instructions on the label. · Do not take two or more pain medicines at the same time unless the doctor told you to. Many pain medicines have acetaminophen, which is Tylenol. Too much acetaminophen (Tylenol) can be harmful. · You can put a heating pad, a warm cloth, or moist heat on your belly to relieve pain. When should you call for help?   Call your doctor now or seek immediate medical care if:    · You have a new or higher fever.     · You have unusual vaginal bleeding.     · You have new or worse belly or pelvic pain.     · You have vaginal discharge that has increased in amount or smells bad.    Watch closely for changes in your health, and be sure to contact your doctor if:    · You do not get better as expected. Where can you learn more? Go to http://yeny-bret.info/. Enter [phone] in the search box to learn more about \"Pelvic Pain: Care Instructions. \"  Current as of: February 19, 2019  Content Version: 12.2  © 2801-7429 [employer]. Care instructions adapted under license by [employer] (which disclaims liability or warranty for this information). If you have questions about a medical condition or this instruction, always ask your healthcare professional. [place] [address] any warranty or liability for your use of this information. Patient Education        Urinary Tract Infection in Women: Care Instructions  Your Care Instructions    A urinary tract infection, or UTI, is a general term for an infection anywhere between the kidneys and the urethra (where urine comes out). Most UTIs are bladder infections. They often cause pain or burning when you urinate. UTIs are caused by bacteria and can be cured with antibiotics. Be sure to complete your treatment so that the infection goes away. Follow-up care is a key part of your treatment and safety. Be sure to make and go to all appointments, and call your doctor if you are having problems. It's also a good idea to know your test results and keep a list of the medicines you take. How can you care for yourself at home? · Take your antibiotics as directed. Do not stop taking them just because you feel better. You need to take the full course of antibiotics. · Drink extra water and other fluids for the next day or two. This may help wash out the bacteria that are causing the infection. (If you have kidney, heart, or liver disease and have to limit fluids, talk with your doctor before you increase your fluid intake.)  · Avoid drinks that are carbonated or have caffeine. They can irritate the bladder.   · Urinate often. Try to empty your bladder each time. · To relieve pain, take a hot bath or lay a heating pad set on low over your lower belly or genital area. Never go to sleep with a heating pad in place. To prevent UTIs  · Drink plenty of water each day. This helps you urinate often, which clears bacteria from your system. (If you have kidney, heart, or liver disease and have to limit fluids, talk with your doctor before you increase your fluid intake.)  · Urinate when you need to. · Urinate right after you have sex. · Change sanitary pads often. · Avoid douches, bubble baths, feminine hygiene sprays, and other feminine hygiene products that have deodorants. · After going to the bathroom, wipe from front to back. When should you call for help? Call your doctor now or seek immediate medical care if:    · Symptoms such as fever, chills, nausea, or vomiting get worse or appear for the first time.     · You have new pain in your back just below your rib cage. This is called flank pain.     · There is new blood or pus in your urine.     · You have any problems with your antibiotic medicine.    Watch closely for changes in your health, and be sure to contact your doctor if:    · You are not getting better after taking an antibiotic for 2 days.     · Your symptoms go away but then come back. Where can you learn more? Go to http://yeny-bret.info/. Enter F221 in the search box to learn more about \"Urinary Tract Infection in Women: Care Instructions. \"  Current as of: December 19, 2018  Content Version: 12.2  © 1296-4330 [employer] Care instructions adapted under license by [employer] (which disclaims liability or warranty for this information). If you have questions about a medical condition or this instruction, always ask your healthcare professional. [address] any warranty or liability for your use of this information.

## 2019-10-29 NOTE — ED PROVIDER NOTES
EMERGENCY DEPARTMENT HISTORY AND PHYSICAL EXAM 
 
 
Date: 10/29/2019 Patient Name: Joe Solorzano Patient Age and Sex: 80 y.o. female History of Presenting Illness Chief Complaint Patient presents with  Fatigue  
  pt was seen on Sunday and started on Keflex for cystitis, her family reports pt was confused yesterday, has not eaten today, has not followed up with PCP History Provided By: Patient, and family HPI: Joe Solorzano  Is a pleasant 54-year-old female presenting today for recheck after being diagnosed with cystitis on Sunday. She was started on Keflex after having suprapubic abdominal pain. Family reports that she was slightly confused yesterday and was not eating, but has improved today. They want her to be rechecked. No fevers, vomiting, abdominal pain, diarrhea, tolerating p.o. antibiotics without difficulty. There are no other complaints, changes, or physical findings at this time. PCP: Ruby Reed NP No current facility-administered medications on file prior to encounter. Current Outpatient Medications on File Prior to Encounter Medication Sig Dispense Refill  cephALEXin (KEFLEX) 500 mg capsule Take 1 Cap by mouth four (4) times daily for 7 days. 28 Cap 0  
 lisinopril (PRINIVIL, ZESTRIL) 20 mg tablet TAKE 1 TABLET BY MOUTH ONCE DAILY 90 Tab 3  
 simvastatin (ZOCOR) 20 mg tablet TAKE 1 TABLET BY MOUTH ONCE DAILY IN THE EVENING 90 Tab 3  
 amLODIPine (NORVASC) 10 mg tablet TAKE 1 TABLET BY MOUTH ONCE DAILY 90 Tab 3  
 hydroCHLOROthiazide (HYDRODIURIL) 12.5 mg tablet TAKE 1 TABLET BY MOUTH ONCE DAILY 90 Tab 3  polyethylene glycol (MIRALAX) 17 gram/dose powder DILUTE ONE CAPFULL IN WATER AS DIRECTED DAILY 765 g 1  
 acetaminophen (TYLENOL ARTHRITIS) 650 mg CR tablet Take 1,300 mg by mouth every twelve (12) hours as needed.  GLUC HCL/CSA/NGOZI HY/HYALUR AC ([id number]-HYALUR AC PO) Take  by mouth.  calcium-cholecalciferol, D3, (CALCARB 600 WITH VITAMIN D) tablet Take 1 Tab by mouth daily.  vit a,c & e-lutein-minerals (OCUVITE) tablet daily.  aspirin delayed-release 81 mg tablet Take  by mouth daily.  omega-3 fatty acids-vitamin e (FISH OIL) 1,000 mg Cap Take 1 Cap by mouth. Past History Past Medical History: 
Past Medical History:  
Diagnosis Date  Disc disease, degenerative, lumbar or lumbosacral   
 HTN (hypertension) 2010  Hypercholesteremia 2010  Osteoporosis, age related Past Surgical History: 
Past Surgical History:  
Procedure Laterality Date   [place] UNLISTED  ENDOSCOPY, COLON, DIAGNOSTIC    
 HX CATARACT REMOVAL    
 HX GYN Family History: 
Family History Problem Relation Age of Onset  Heart Disease Mother  Cancer Father   
     colon  No Known Problems Sister  No Known Problems Brother Social History: 
Social History Tobacco Use  Smoking status: Former Smoker Last attempt to quit: 5/3/1986 Years since quittin.5  Smokeless tobacco: Never Used Substance Use Topics  Alcohol use: Yes Comment: wine occas  Drug use: No  
 
 
Allergies: 
No Known Allergies Review of Systems Constitutional: No  fever,  No  headache Skin: No  rash, No  jaundice HEENT: No  nasal congestion, No  eye drainage. Resp: No cough,  No  wheezing CV: No chest pain, No  palpitations GI: No vomiting,  No  diarrhea.,  No  constipation : No dysuria,  No  hematuria MSK: No joint pain,  No  trauma Neuro: No numbness, No  tingling Psych: No suicidal, No  paranoid Physical Exam  
 
Patient Vitals for the past 12 hrs: 
 Temp Pulse Resp BP SpO2  
10/29/19 1711     98 % 10/29/19 1709    120/70   
10/29/19 1526 98.8 °F (37.1 °C) 85 18 111/68 98 % General: alert, No acute distress Eyes: EOMI, normal conjunctiva ENT: moist mucous membranes. Neck: Active, full ROM of neck. Skin: No rashes. no jaundice Lungs: Equal chest expansion. no respiratory distress. clear to auscultation bilaterally No accessory muscle usage Heart: regular rate     no peripheral edema   2+ radial pulses and DPs bilaterally Abd:  non distended soft, nontender. No rebound tenderness. No guarding Back: Full ROM, no CVA tenderness MSK: Full, active ROM in all 4 extremities. Neuro: Person, Place, Time and Situation; normal speech;  
Psych: Cooperative with exam; Appropriate mood and affect Diagnostic Study Results Labs - Recent Results (from the past 12 hour(s)) CBC WITH AUTOMATED DIFF Collection Time: 10/29/19  4:02 PM  
Result Value Ref Range WBC 19.3 (H) 3.6 - 11.0 K/uL  
 RBC 3.67 (L) 3.80 - 5.20 M/uL  
 HGB 11.0 (L) 11.5 - 16.0 g/dL HCT 34.6 (L) 35.0 - 47.0 % MCV 94.3 80.0 - 99.0 FL  
 MCH 30.0 26.0 - 34.0 PG  
 MCHC 31.8 30.0 - 36.5 g/dL  
 RDW 13.1 11.5 - 14.5 % PLATELET 579 116 - 395 K/uL MPV 10.3 8.9 - 12.9 FL  
 NRBC 0.0 0  WBC ABSOLUTE NRBC 0.00 0.00 - 0.01 K/uL NEUTROPHILS 74 32 - 75 % LYMPHOCYTES 20 12 - 49 % MONOCYTES 5 5 - 13 % EOSINOPHILS 0 0 - 7 % BASOPHILS 0 0 - 1 % IMMATURE GRANULOCYTES 1 (H) 0.0 - 0.5 % ABS. NEUTROPHILS 14.4 (H) 1.8 - 8.0 K/UL  
 ABS. LYMPHOCYTES 3.8 (H) 0.8 - 3.5 K/UL  
 ABS. MONOCYTES 0.9 0.0 - 1.0 K/UL  
 ABS. EOSINOPHILS 0.0 0.0 - 0.4 K/UL  
 ABS. BASOPHILS 0.0 0.0 - 0.1 K/UL  
 ABS. IMM. GRANS. 0.1 (H) 0.00 - 0.04 K/UL  
 DF AUTOMATED METABOLIC PANEL, COMPREHENSIVE Collection Time: 10/29/19  4:02 PM  
Result Value Ref Range Sodium 137 136 - 145 mmol/L Potassium 3.4 (L) 3.5 - 5.1 mmol/L Chloride 102 97 - 108 mmol/L  
 CO2 27 21 - 32 mmol/L Anion gap 8 5 - 15 mmol/L Glucose 112 (H) 65 - 100 mg/dL BUN 28 (H) 6 - 20 MG/DL Creatinine 1.67 (H) 0.55 - 1.02 MG/DL  
 BUN/Creatinine ratio 17 12 - 20 GFR est AA 35 (L) >60 ml/min/1.73m2 GFR est non-AA 29 (L) >60 ml/min/1.73m2 Calcium 9.2 8.5 - 10.1 MG/DL Bilirubin, total 0.6 0.2 - 1.0 MG/DL  
 ALT (SGPT) 20 12 - 78 U/L  
 AST (SGOT) 44 (H) 15 - 37 U/L Alk. phosphatase 67 45 - 117 U/L Protein, total 7.3 6.4 - 8.2 g/dL Albumin 3.0 (L) 3.5 - 5.0 g/dL Globulin 4.3 (H) 2.0 - 4.0 g/dL A-G Ratio 0.7 (L) 1.1 - 2.2 Radiologic Studies - No orders to display CT Results  (Last 48 hours) None CXR Results  (Last 48 hours) None Medical Decision Making Differential Diagnosis: Dehydration, urinary tract infection, sepsis, electrolyte derangement I reviewed the vital signs, available nursing notes, past medical history, past surgical history, family history and social history and old medical records. On my interpretation, Laboratory workup is significant for lab significant for slightly elevated creatinine 1.67, white blood cell count is 19.3 which has increased slightly from her previous check, lactate was negative Management/ED course: Patient presents today for recheck after being diagnosed with acute cystitis 2 days ago. Her initial blood cultures have no growth, she did have a slight elevation in her white blood cell count, but has no fever, normal vital signs, no abdominal tenderness, and improving symptoms. She had decreased p.o. intake yesterday. She does have a slightly elevated creatinine. Treated with 1 L of IV fluids. The patient and family are given return precautions and her discharge to follow-up with primary care provider in the next week for recheck of labs. Dispo: Discharged. The patient has been re-evaluated and is ready for discharge. Reviewed available results with patient. Counseled patient on diagnosis and care plan. Patient has expressed understanding, and all questions have been answered. Patient agrees with plan and agrees to follow up as recommended, or to return to the ED if their symptoms worsen. Discharge instructions have been provided and explained to the patient, along with reasons to return to the ED. PLAN: 
Discharge Medication List as of 10/29/2019  5:30 PM  
  
 
2. Follow-up Information Follow up With Specialties Details Why Contact Info Judy Richardson NP Nurse Practitioner   [address] [place] [id number] [phone] 3. Return to ED if worse Diagnosis Clinical Impression: 1. Urinary tract infection without hematuria, site unspecified 2. NAVA (acute [facility]) ([facility] Attestations: 
 
Vidya Elias MD

## 2019-10-29 NOTE — ED TRIAGE NOTES
Family reports pt is feeling increased weakness today, denies any other concerns, denies any pain, no distress noted, vss, family remains at bedside.

## 2019-10-31 NOTE — TELEPHONE ENCOUNTER
Patient wants to be seen for a f/u ER visit on Monday.   Please give the daughter Juan Manuel Purdy a call @ [phone]

## 2019-10-31 NOTE — TELEPHONE ENCOUNTER
Verified patient with two type of identifiers. Spoke with pt's daughter and scheduled pt for 11/8/2019 at 6 AM. Pt's daughter verbalized understanding.

## 2019-11-08 NOTE — PROGRESS NOTES
HISTORY OF PRESENT ILLNESS Codie Hubbard is a 80 y.o. female. HPI  Patient comes in today for ED follow up Went to [address] [place] [address] ED on 10/27/19, dx with UTI. Given Rocephin IV and sent home with Keflex. Went back to ED on 10/29/19 because did not feel like she was improving on abx. Creatinine was elevated, given fluid bolus and sent home for follow up with me. ED records reviewed. Patient comes in today with daughter. Patient states she feels fine, denies abd pain, dysuria, fever, chills. Daughter states patient is still a little more confused than normal.  Having more frequent urinary incontinence. Would like to have urine rechecked today. No Known Allergies Past Medical History:  
Diagnosis Date  Disc disease, degenerative, lumbar or lumbosacral   
 HTN (hypertension) 2010  Hypercholesteremia 2010  Osteoporosis, age related Past Surgical History:  
Procedure Laterality Date [address] UNLISTED  ENDOSCOPY, COLON, DIAGNOSTIC    
 HX CATARACT REMOVAL    
 HX GYN Social History Socioeconomic History  Marital status:  Spouse name: Not on file  Number of children: Not on file  Years of education: Not on file  Highest education level: Not on file Occupational History  Not on file Social Needs  Financial resource strain: Not on file  Food insecurity:  
  Worry: Not on file Inability: Not on file  Transportation needs:  
  Medical: Not on file Non-medical: Not on file Tobacco Use  Smoking status: Former Smoker Last attempt to quit: 5/3/1986 Years since quittin.5  Smokeless tobacco: Never Used Substance and Sexual Activity  Alcohol use: Yes Comment: wine occas  Drug use: No  
 Sexual activity: Not Currently Lifestyle  Physical activity:  
  Days per week: Not on file Minutes per session: Not on file  Stress: Not on file Relationships  Social connections: Talks on phone: Not on file Gets together: Not on file Attends [employer] service: Not on file Active member of club or organization: Not on file Attends meetings of clubs or organizations: Not on file Relationship status: Not on file  Intimate partner violence:  
  Fear of current or ex partner: Not on file Emotionally abused: Not on file Physically abused: Not on file Forced sexual activity: Not on file Other Topics Concern  Not on file Social History Narrative  Not on file Family History Problem Relation Age of Onset  Heart Disease Mother  Cancer Father   
     colon  No Known Problems Sister  No Known Problems Brother Current Outpatient Medications Medication Sig  
 lisinopril (PRINIVIL, ZESTRIL) 20 mg tablet TAKE 1 TABLET BY MOUTH ONCE DAILY  simvastatin (ZOCOR) 20 mg tablet TAKE 1 TABLET BY MOUTH ONCE DAILY IN THE EVENING  
 amLODIPine (NORVASC) 10 mg tablet TAKE 1 TABLET BY MOUTH ONCE DAILY  hydroCHLOROthiazide (HYDRODIURIL) 12.5 mg tablet TAKE 1 TABLET BY MOUTH ONCE DAILY  polyethylene glycol (MIRALAX) 17 gram/dose powder DILUTE ONE CAPFULL IN WATER AS DIRECTED DAILY  acetaminophen (TYLENOL ARTHRITIS) 650 mg CR tablet Take 1,300 mg by mouth every twelve (12) hours as needed.  GLUC HCL/CSA/NGOZI HY/HYALUR AC ([id number]-HYALUR AC PO) Take  by mouth.  calcium-cholecalciferol, D3, (CALCARB 600 WITH VITAMIN D) tablet Take 1 Tab by mouth daily.  vit a,c & e-lutein-minerals (OCUVITE) tablet daily.  aspirin delayed-release 81 mg tablet Take  by mouth daily.  omega-3 fatty acids-vitamin e (FISH OIL) 1,000 mg Cap Take 1 Cap by mouth. No current facility-administered medications for this visit. Review of Systems Constitutional: Negative for chills and fever. Respiratory: Negative. Cardiovascular: Negative. Gastrointestinal: Negative for abdominal pain. Genitourinary: Negative for dysuria, flank pain, frequency, hematuria and urgency. Urinary incontinence, more than normal  
Neurological:  
     Increased confusion per daughter Vitals:  
 11/08/19 1108 BP: 171/83 Pulse: 85 Resp: 18 Temp: 96.9 °F (36.1 °C) TempSrc: Oral  
SpO2: 97% Weight: 129 lb 8 oz (58.7 kg) Height: 5' 4\" (1.626 m) Physical Exam  
Constitutional: She is oriented to person, place, and time. She appears well-developed and well-nourished. Cardiovascular: Normal rate, regular rhythm and normal heart sounds. Pulmonary/Chest: Effort normal and breath sounds normal.  
Abdominal: Soft. Normal appearance and bowel sounds are normal. There is no tenderness. Neurological: She is alert and oriented to person, place, and time. Skin: Skin is warm and dry. Psychiatric: She has a normal mood and affect. Her behavior is normal. Judgment and thought content normal.  
Vitals reviewed. ASSESSMENT and PLAN 
  ICD-10-CM ICD-9-CM 1. Urinary tract infection without hematuria, site unspecified N39.0 599.0 AMB POC URINALYSIS DIP STICK AUTO W/ MICRO   
   CULTURE, URINE  
   METABOLIC PANEL, COMPREHENSIVE  
   AMB POC COMPLETE CBC,AUTOMATED ENTER  
   levoFLOXacin (LEVAQUIN) 750 mg tablet Encounter Diagnoses Name Primary?  Urinary tract infection without hematuria, site unspecified Yes Orders Placed This Encounter  CULTURE, URINE  
 METABOLIC PANEL, COMPREHENSIVE  
 AMB POC URINALYSIS DIP STICK AUTO W/ MICRO  AMB POC COMPLETE CBC,AUTOMATED ENTER  levoFLOXacin (LEVAQUIN) 750 mg tablet Diagnoses and all orders for this visit: 
 
1. Urinary tract infection without hematuria, site unspecified - UA still positive nitrites, WBC 10-20/hpf under micro. Will send urine for culture again, given Levaquin, repeat urine in 2 weeks. Discussed when to seek urgent care.  
-     AMB POC URINALYSIS DIP STICK AUTO W/ MICRO  
-     CULTURE, URINE 
 -     METABOLIC PANEL, COMPREHENSIVE 
-     AMB POC COMPLETE CBC,AUTOMATED ENTER 
-     levoFLOXacin (LEVAQUIN) 750 mg tablet; Take 1 Tab by mouth daily for 10 days. Follow-up and Dispositions · Return if symptoms worsen or fail to improve. I have reviewed the patient's allergies and made any necessary changes. Medical, procedural, social and family histories have been reviewed and updated as medically indicated. I have reconciled and/or revised patient medications in the EMR. I have discussed each diagnosis listed in this note with Ash Kaye and/or their family. I have discussed treatment options and the risk/benefit analysis of those options, including safe use of medications and possible medication side effects. Through the use of shared decision making we have agreed to the above plan. The patient has received an after-visit summary and questions were answered concerning future plans. Olivia Brooks, EDINP-C This note will not be viewable in [address]

## 2019-11-08 NOTE — PATIENT INSTRUCTIONS
Urinary Tract Infection in Women: Care Instructions Your Care Instructions A urinary tract infection, or UTI, is a general term for an infection anywhere between the kidneys and the urethra (where urine comes out). Most UTIs are bladder infections. They often cause pain or burning when you urinate. UTIs are caused by bacteria and can be cured with antibiotics. Be sure to complete your treatment so that the infection goes away. Follow-up care is a key part of your treatment and safety. Be sure to make and go to all appointments, and call your doctor if you are having problems. It's also a good idea to know your test results and keep a list of the medicines you take. How can you care for yourself at home? · Take your antibiotics as directed. Do not stop taking them just because you feel better. You need to take the full course of antibiotics. · Drink extra water and other fluids for the next day or two. This may help wash out the bacteria that are causing the infection. (If you have kidney, heart, or liver disease and have to limit fluids, talk with your doctor before you increase your fluid intake.) · Avoid drinks that are carbonated or have caffeine. They can irritate the bladder. · Urinate often. Try to empty your bladder each time. · To relieve pain, take a hot bath or lay a heating pad set on low over your lower belly or genital area. Never go to sleep with a heating pad in place. To prevent UTIs · Drink plenty of water each day. This helps you urinate often, which clears bacteria from your system. (If you have kidney, heart, or liver disease and have to limit fluids, talk with your doctor before you increase your fluid intake.) · Urinate when you need to. · Urinate right after you have sex. · Change sanitary pads often. · Avoid douches, bubble baths, feminine hygiene sprays, and other feminine hygiene products that have deodorants. · After going to the bathroom, wipe from front to back. When should you call for help? Call your doctor now or seek immediate medical care if: 
  · Symptoms such as fever, chills, nausea, or vomiting get worse or appear for the first time.  
  · You have new pain in your back just below your rib cage. This is called flank pain.  
  · There is new blood or pus in your urine.  
  · You have any problems with your antibiotic medicine.  
 Watch closely for changes in your health, and be sure to contact your doctor if: 
  · You are not getting better after taking an antibiotic for 2 days.  
  · Your symptoms go away but then come back. Where can you learn more? Go to http://yeny-bret.info/. Enter A316 in the search box to learn more about \"Urinary Tract Infection in Women: Care Instructions. \" Current as of: December 19, 2018 Content Version: 12.2 © 5667-5901 [employer]. Care instructions adapted under license by [employer] (which disclaims liability or warranty for this information). If you have questions about a medical condition or this instruction, always ask your healthcare professional. [address] any warranty or liability for your use of this information.

## 2019-11-08 NOTE — PROGRESS NOTES
Chief Complaint Patient presents with  ED Follow-up 10/29/2019 & 10/27/2019 1. Have you been to the ER, urgent care clinic since your last visit? Hospitalized since your last visit? No 
 
2. Have you seen or consulted any other health care providers outside of the [address] [place] [address] since your last visit? Include any pap smears or colon screening. No 
 
Health Maintenance Due Topic Date Due  Shingrix Vaccine Age 50> (1 of 2) [birth year]/1974  GLAUCOMA SCREENING Q2Y  08/23/2018

## 2019-11-12 NOTE — PROGRESS NOTES
RECOMMENDATIONS: 
Urine culture positive but bacteria is susceptible to Levaquin. Make sure you complete the full dose of antibiotics.

## 2019-11-25 NOTE — PROGRESS NOTES
Patient comes in today for lab only to repeat urine. Was treated for UTI on 11/8/19. Orders Placed This Encounter    AMB POC URINALYSIS DIP STICK AUTO W/ MICRO      UA negative for LE and nitrites. Micro exam with WBC 2-4/hpf, few bacteria.   Improved since completing levaquin

## 2020-01-01 ENCOUNTER — HOME CARE VISIT (OUTPATIENT)
Dept: HOSPICE | Facility: HOSPICE | Age: 85
End: 2020-01-01
Payer: MEDICARE

## 2020-01-01 ENCOUNTER — HOME CARE VISIT (OUTPATIENT)
Dept: SCHEDULING | Facility: HOME HEALTH | Age: 85
End: 2020-01-01
Payer: MEDICARE

## 2020-01-01 ENCOUNTER — PATIENT OUTREACH (OUTPATIENT)
Dept: INTERNAL MEDICINE CLINIC | Age: 85
End: 2020-01-01

## 2020-01-01 ENCOUNTER — APPOINTMENT (OUTPATIENT)
Dept: GENERAL RADIOLOGY | Age: 85
DRG: 872 | End: 2020-01-01
Attending: INTERNAL MEDICINE
Payer: MEDICARE

## 2020-01-01 ENCOUNTER — APPOINTMENT (OUTPATIENT)
Dept: NON INVASIVE DIAGNOSTICS | Age: 85
DRG: 193 | End: 2020-01-01
Attending: INTERNAL MEDICINE
Payer: MEDICARE

## 2020-01-01 ENCOUNTER — HOSPITAL ENCOUNTER (INPATIENT)
Age: 85
LOS: 5 days | Discharge: HOSPICE | DRG: 872 | End: 2020-02-05
Attending: EMERGENCY MEDICINE | Admitting: INTERNAL MEDICINE
Payer: MEDICARE

## 2020-01-01 ENCOUNTER — APPOINTMENT (OUTPATIENT)
Dept: GENERAL RADIOLOGY | Age: 85
DRG: 872 | End: 2020-01-01
Attending: EMERGENCY MEDICINE
Payer: MEDICARE

## 2020-01-01 ENCOUNTER — HOSPITAL ENCOUNTER (INPATIENT)
Age: 85
LOS: 5 days | Discharge: NURSING, REHAB OR LONG-TERM CARE | DRG: 193 | End: 2020-01-14
Attending: EMERGENCY MEDICINE | Admitting: INTERNAL MEDICINE
Payer: MEDICARE

## 2020-01-01 ENCOUNTER — APPOINTMENT (OUTPATIENT)
Dept: CT IMAGING | Age: 85
DRG: 872 | End: 2020-01-01
Attending: INTERNAL MEDICINE
Payer: MEDICARE

## 2020-01-01 ENCOUNTER — TELEPHONE (OUTPATIENT)
Dept: FAMILY MEDICINE CLINIC | Age: 85
End: 2020-01-01

## 2020-01-01 ENCOUNTER — APPOINTMENT (OUTPATIENT)
Dept: CT IMAGING | Age: 85
DRG: 193 | End: 2020-01-01
Attending: NURSE PRACTITIONER
Payer: MEDICARE

## 2020-01-01 ENCOUNTER — APPOINTMENT (OUTPATIENT)
Dept: GENERAL RADIOLOGY | Age: 85
DRG: 193 | End: 2020-01-01
Attending: NURSE PRACTITIONER
Payer: MEDICARE

## 2020-01-01 ENCOUNTER — APPOINTMENT (OUTPATIENT)
Dept: GENERAL RADIOLOGY | Age: 85
DRG: 193 | End: 2020-01-01
Attending: INTERNAL MEDICINE
Payer: MEDICARE

## 2020-01-01 ENCOUNTER — HOSPICE ADMISSION (OUTPATIENT)
Dept: HOSPICE | Facility: HOSPICE | Age: 85
End: 2020-01-01
Payer: MEDICARE

## 2020-01-01 VITALS
BODY MASS INDEX: 22.15 KG/M2 | HEART RATE: 67 BPM | WEIGHT: 125 LBS | OXYGEN SATURATION: 97 % | SYSTOLIC BLOOD PRESSURE: 156 MMHG | RESPIRATION RATE: 23 BRPM | HEIGHT: 63 IN | TEMPERATURE: 97.4 F | DIASTOLIC BLOOD PRESSURE: 78 MMHG

## 2020-01-01 VITALS
DIASTOLIC BLOOD PRESSURE: 83 MMHG | SYSTOLIC BLOOD PRESSURE: 150 MMHG | RESPIRATION RATE: 16 BRPM | TEMPERATURE: 98.7 F | HEART RATE: 68 BPM

## 2020-01-01 VITALS — RESPIRATION RATE: 24 BRPM

## 2020-01-01 VITALS
RESPIRATION RATE: 16 BRPM | OXYGEN SATURATION: 96 % | HEART RATE: 87 BPM | SYSTOLIC BLOOD PRESSURE: 128 MMHG | DIASTOLIC BLOOD PRESSURE: 74 MMHG | TEMPERATURE: 97.4 F

## 2020-01-01 VITALS
TEMPERATURE: 98.5 F | HEIGHT: 63 IN | SYSTOLIC BLOOD PRESSURE: 173 MMHG | BODY MASS INDEX: 21.26 KG/M2 | HEART RATE: 94 BPM | OXYGEN SATURATION: 94 % | WEIGHT: 120 LBS | RESPIRATION RATE: 16 BRPM | DIASTOLIC BLOOD PRESSURE: 79 MMHG

## 2020-01-01 VITALS
SYSTOLIC BLOOD PRESSURE: 128 MMHG | DIASTOLIC BLOOD PRESSURE: 70 MMHG | TEMPERATURE: 98.1 F | HEART RATE: 79 BPM | RESPIRATION RATE: 18 BRPM | OXYGEN SATURATION: 98 %

## 2020-01-01 VITALS
HEART RATE: 76 BPM | RESPIRATION RATE: 20 BRPM | SYSTOLIC BLOOD PRESSURE: 150 MMHG | OXYGEN SATURATION: 96 % | WEIGHT: 125 LBS | BODY MASS INDEX: 22.15 KG/M2 | DIASTOLIC BLOOD PRESSURE: 62 MMHG | HEIGHT: 63 IN

## 2020-01-01 DIAGNOSIS — Z71.89 GOALS OF CARE, COUNSELING/DISCUSSION: ICD-10-CM

## 2020-01-01 DIAGNOSIS — I48.91 ATRIAL FIBRILLATION WITH RVR (HCC): ICD-10-CM

## 2020-01-01 DIAGNOSIS — R53.83 FATIGUE, UNSPECIFIED TYPE: ICD-10-CM

## 2020-01-01 DIAGNOSIS — R54 FRAILTY: ICD-10-CM

## 2020-01-01 DIAGNOSIS — Z71.89 DNR (DO NOT RESUSCITATE) DISCUSSION: ICD-10-CM

## 2020-01-01 DIAGNOSIS — N39.0 SEPSIS SECONDARY TO UTI (HCC): Primary | ICD-10-CM

## 2020-01-01 DIAGNOSIS — N30.01 ACUTE CYSTITIS WITH HEMATURIA: ICD-10-CM

## 2020-01-01 DIAGNOSIS — R53.83 LETHARGY: ICD-10-CM

## 2020-01-01 DIAGNOSIS — A41.9 SEPSIS SECONDARY TO UTI (HCC): Primary | ICD-10-CM

## 2020-01-01 DIAGNOSIS — R53.81 DEBILITY: ICD-10-CM

## 2020-01-01 DIAGNOSIS — I48.91 NEW ONSET A-FIB (HCC): Primary | ICD-10-CM

## 2020-01-01 DIAGNOSIS — R40.4 ALTERED SENSORIUM: ICD-10-CM

## 2020-01-01 DIAGNOSIS — R53.1 WEAKNESS: ICD-10-CM

## 2020-01-01 DIAGNOSIS — Z71.89 ADVANCED CARE PLANNING/COUNSELING DISCUSSION: ICD-10-CM

## 2020-01-01 LAB
ALBUMIN SERPL-MCNC: 1.5 G/DL (ref 3.5–5)
ALBUMIN SERPL-MCNC: 1.8 G/DL (ref 3.5–5)
ALBUMIN SERPL-MCNC: 2.9 G/DL (ref 3.5–5)
ALBUMIN SERPL-MCNC: 3 G/DL (ref 3.5–5)
ALBUMIN/GLOB SERPL: 0.5 {RATIO} (ref 1.1–2.2)
ALBUMIN/GLOB SERPL: 0.5 {RATIO} (ref 1.1–2.2)
ALBUMIN/GLOB SERPL: 0.6 {RATIO} (ref 1.1–2.2)
ALBUMIN/GLOB SERPL: 0.7 {RATIO} (ref 1.1–2.2)
ALP SERPL-CCNC: 66 U/L (ref 45–117)
ALP SERPL-CCNC: 81 U/L (ref 45–117)
ALP SERPL-CCNC: 87 U/L (ref 45–117)
ALP SERPL-CCNC: 87 U/L (ref 45–117)
ALT SERPL-CCNC: 16 U/L (ref 12–78)
ALT SERPL-CCNC: 17 U/L (ref 12–78)
ALT SERPL-CCNC: 7 U/L (ref 12–78)
ALT SERPL-CCNC: 8 U/L (ref 12–78)
AMORPH CRY URNS QL MICRO: ABNORMAL
ANION GAP SERPL CALC-SCNC: 10 MMOL/L (ref 5–15)
ANION GAP SERPL CALC-SCNC: 10 MMOL/L (ref 5–15)
ANION GAP SERPL CALC-SCNC: 4 MMOL/L (ref 5–15)
ANION GAP SERPL CALC-SCNC: 5 MMOL/L (ref 5–15)
ANION GAP SERPL CALC-SCNC: 6 MMOL/L (ref 5–15)
ANION GAP SERPL CALC-SCNC: 7 MMOL/L (ref 5–15)
ANION GAP SERPL CALC-SCNC: 7 MMOL/L (ref 5–15)
ANION GAP SERPL CALC-SCNC: 8 MMOL/L (ref 5–15)
ANION GAP SERPL CALC-SCNC: 9 MMOL/L (ref 5–15)
APPEARANCE UR: ABNORMAL
AST SERPL-CCNC: 13 U/L (ref 15–37)
AST SERPL-CCNC: 19 U/L (ref 15–37)
AST SERPL-CCNC: 26 U/L (ref 15–37)
AST SERPL-CCNC: 9 U/L (ref 15–37)
ATRIAL RATE: 241 BPM
ATRIAL RATE: 87 BPM
ATRIAL RATE: 87 BPM
AV VELOCITY RATIO: 0.63
BACTERIA SPEC CULT: NORMAL
BACTERIA URNS QL MICRO: ABNORMAL /HPF
BACTERIA URNS QL MICRO: ABNORMAL /HPF
BACTERIA URNS QL MICRO: NEGATIVE /HPF
BASOPHILS # BLD: 0 K/UL (ref 0–0.1)
BASOPHILS NFR BLD: 0 % (ref 0–1)
BILIRUB SERPL-MCNC: 0.5 MG/DL (ref 0.2–1)
BILIRUB SERPL-MCNC: 0.6 MG/DL (ref 0.2–1)
BILIRUB SERPL-MCNC: 1.1 MG/DL (ref 0.2–1)
BILIRUB SERPL-MCNC: 1.3 MG/DL (ref 0.2–1)
BILIRUB UR QL CFM: NEGATIVE
BILIRUB UR QL CFM: NEGATIVE
BILIRUB UR QL: NEGATIVE
BUN SERPL-MCNC: 14 MG/DL (ref 6–20)
BUN SERPL-MCNC: 14 MG/DL (ref 6–20)
BUN SERPL-MCNC: 15 MG/DL (ref 6–20)
BUN SERPL-MCNC: 17 MG/DL (ref 6–20)
BUN SERPL-MCNC: 18 MG/DL (ref 6–20)
BUN SERPL-MCNC: 19 MG/DL (ref 6–20)
BUN SERPL-MCNC: 22 MG/DL (ref 6–20)
BUN SERPL-MCNC: 30 MG/DL (ref 6–20)
BUN SERPL-MCNC: 31 MG/DL (ref 6–20)
BUN SERPL-MCNC: 7 MG/DL (ref 6–20)
BUN SERPL-MCNC: 8 MG/DL (ref 6–20)
BUN SERPL-MCNC: 9 MG/DL (ref 6–20)
BUN/CREAT SERPL: 13 (ref 12–20)
BUN/CREAT SERPL: 14 (ref 12–20)
BUN/CREAT SERPL: 17 (ref 12–20)
BUN/CREAT SERPL: 20 (ref 12–20)
BUN/CREAT SERPL: 21 (ref 12–20)
BUN/CREAT SERPL: 24 (ref 12–20)
BUN/CREAT SERPL: 32 (ref 12–20)
C DIFF GDH STL QL: POSITIVE
C DIFF TOX A+B STL QL IA: POSITIVE
CALCIUM SERPL-MCNC: 6.7 MG/DL (ref 8.5–10.1)
CALCIUM SERPL-MCNC: 7.2 MG/DL (ref 8.5–10.1)
CALCIUM SERPL-MCNC: 7.3 MG/DL (ref 8.5–10.1)
CALCIUM SERPL-MCNC: 7.6 MG/DL (ref 8.5–10.1)
CALCIUM SERPL-MCNC: 8 MG/DL (ref 8.5–10.1)
CALCIUM SERPL-MCNC: 8.3 MG/DL (ref 8.5–10.1)
CALCIUM SERPL-MCNC: 8.4 MG/DL (ref 8.5–10.1)
CALCIUM SERPL-MCNC: 8.5 MG/DL (ref 8.5–10.1)
CALCIUM SERPL-MCNC: 8.8 MG/DL (ref 8.5–10.1)
CALCIUM SERPL-MCNC: 8.9 MG/DL (ref 8.5–10.1)
CALCULATED P AXIS, ECG09: 54 DEGREES
CALCULATED P AXIS, ECG09: 77 DEGREES
CALCULATED R AXIS, ECG10: 14 DEGREES
CALCULATED R AXIS, ECG10: 3 DEGREES
CALCULATED R AXIS, ECG10: 48 DEGREES
CALCULATED R AXIS, ECG10: 8 DEGREES
CALCULATED T AXIS, ECG11: -137 DEGREES
CALCULATED T AXIS, ECG11: -139 DEGREES
CALCULATED T AXIS, ECG11: 35 DEGREES
CALCULATED T AXIS, ECG11: 48 DEGREES
CAMPYLOBACTER SPECIES, DNA: NEGATIVE
CC UR VC: NORMAL
CC UR VC: NORMAL
CHLORIDE SERPL-SCNC: 100 MMOL/L (ref 97–108)
CHLORIDE SERPL-SCNC: 101 MMOL/L (ref 97–108)
CHLORIDE SERPL-SCNC: 102 MMOL/L (ref 97–108)
CHLORIDE SERPL-SCNC: 103 MMOL/L (ref 97–108)
CHLORIDE SERPL-SCNC: 104 MMOL/L (ref 97–108)
CHLORIDE SERPL-SCNC: 104 MMOL/L (ref 97–108)
CHLORIDE SERPL-SCNC: 105 MMOL/L (ref 97–108)
CHLORIDE SERPL-SCNC: 105 MMOL/L (ref 97–108)
CHLORIDE SERPL-SCNC: 106 MMOL/L (ref 97–108)
CHLORIDE SERPL-SCNC: 107 MMOL/L (ref 97–108)
CO2 SERPL-SCNC: 16 MMOL/L (ref 21–32)
CO2 SERPL-SCNC: 17 MMOL/L (ref 21–32)
CO2 SERPL-SCNC: 19 MMOL/L (ref 21–32)
CO2 SERPL-SCNC: 19 MMOL/L (ref 21–32)
CO2 SERPL-SCNC: 20 MMOL/L (ref 21–32)
CO2 SERPL-SCNC: 22 MMOL/L (ref 21–32)
CO2 SERPL-SCNC: 24 MMOL/L (ref 21–32)
CO2 SERPL-SCNC: 25 MMOL/L (ref 21–32)
CO2 SERPL-SCNC: 26 MMOL/L (ref 21–32)
CO2 SERPL-SCNC: 27 MMOL/L (ref 21–32)
CO2 SERPL-SCNC: 27 MMOL/L (ref 21–32)
CO2 SERPL-SCNC: 28 MMOL/L (ref 21–32)
COLOR UR: ABNORMAL
COLOR UR: ABNORMAL
COLOR UR: YELLOW
COMMENT, HOLDF: NORMAL
CREAT SERPL-MCNC: 0.5 MG/DL (ref 0.55–1.02)
CREAT SERPL-MCNC: 0.52 MG/DL (ref 0.55–1.02)
CREAT SERPL-MCNC: 0.58 MG/DL (ref 0.55–1.02)
CREAT SERPL-MCNC: 0.59 MG/DL (ref 0.55–1.02)
CREAT SERPL-MCNC: 0.63 MG/DL (ref 0.55–1.02)
CREAT SERPL-MCNC: 0.66 MG/DL (ref 0.55–1.02)
CREAT SERPL-MCNC: 0.71 MG/DL (ref 0.55–1.02)
CREAT SERPL-MCNC: 0.71 MG/DL (ref 0.55–1.02)
CREAT SERPL-MCNC: 0.75 MG/DL (ref 0.55–1.02)
CREAT SERPL-MCNC: 1.04 MG/DL (ref 0.55–1.02)
CREAT SERPL-MCNC: 1.45 MG/DL (ref 0.55–1.02)
CREAT SERPL-MCNC: 1.5 MG/DL (ref 0.55–1.02)
DIAGNOSIS, 93000: NORMAL
DIFFERENTIAL METHOD BLD: ABNORMAL
ECHO AO ROOT DIAM: 3.04 CM
ECHO AV AREA PEAK VELOCITY: 0.8 CM2
ECHO AV AREA/BSA PEAK VELOCITY: 0.5 CM2/M2
ECHO AV PEAK GRADIENT: 8.5 MMHG
ECHO AV PEAK VELOCITY: 145.5 CM/S
ECHO EST RA PRESSURE: 10 MMHG
ECHO LA AREA 4C: 12.6 CM2
ECHO LA MAJOR AXIS: 3.34 CM
ECHO LA TO AORTIC ROOT RATIO: 1.1
ECHO LA VOL 4C: 23.91 ML (ref 22–52)
ECHO LA VOLUME INDEX A4C: 15.36 ML/M2 (ref 16–28)
ECHO LV E' LATERAL VELOCITY: 8.17 CM/S
ECHO LV E' SEPTAL VELOCITY: 7.72 CM/S
ECHO LV EDV TEICHHOLZ: 0.33 ML
ECHO LV ESV TEICHHOLZ: 0.27 ML
ECHO LV INTERNAL DIMENSION DIASTOLIC: 3.57 CM (ref 3.9–5.3)
ECHO LV INTERNAL DIMENSION SYSTOLIC: 3.27 CM
ECHO LV IVSD: 1.18 CM (ref 0.6–0.9)
ECHO LV MASS 2D: 140.1 G (ref 67–162)
ECHO LV MASS INDEX 2D: 90 G/M2 (ref 43–95)
ECHO LV POSTERIOR WALL DIASTOLIC: 1.03 CM (ref 0.6–0.9)
ECHO LV POSTERIOR WALL SYSTOLIC: 0.97 CM
ECHO LVOT DIAM: 1.29 CM
ECHO LVOT PEAK GRADIENT: 3.3 MMHG
ECHO LVOT PEAK VELOCITY: 91.07 CM/S
ECHO LVOT SV: 27.9 ML
ECHO LVOT VTI: 21.36 CM
ECHO MV A VELOCITY: 107.55 CM/S
ECHO MV AREA VTI: 1 CM2
ECHO MV E DECELERATION TIME (DT): 224.9 MS
ECHO MV E VELOCITY: 105.76 CM/S
ECHO MV E/A RATIO: 0.98
ECHO MV E/E' LATERAL: 12.94
ECHO MV E/E' RATIO (AVERAGED): 13.32
ECHO MV E/E' SEPTAL: 13.7
ECHO MV MAX VELOCITY: 123.53 CM/S
ECHO MV MEAN GRADIENT: 3.1 MMHG
ECHO MV MEAN INFLOW VELOCITY: 0.83 M/S
ECHO MV PEAK GRADIENT: 6.1 MMHG
ECHO MV VTI: 29.04 CM
ECHO PULMONARY ARTERY SYSTOLIC PRESSURE (PASP): 55.4 MMHG
ECHO RA AREA 4C: 9.81 CM2
ECHO RIGHT VENTRICULAR SYSTOLIC PRESSURE (RVSP): 55.4 MMHG
ECHO TV REGURGITANT MAX VELOCITY: 336.97 CM/S
ECHO TV REGURGITANT PEAK GRADIENT: 45.4 MMHG
ENTEROTOXIGEN E COLI, DNA: NEGATIVE
EOSINOPHIL # BLD: 0 K/UL (ref 0–0.4)
EOSINOPHIL # BLD: 0.2 K/UL (ref 0–0.4)
EOSINOPHIL NFR BLD: 0 % (ref 0–7)
EOSINOPHIL NFR BLD: 2 % (ref 0–7)
EPITH CASTS URNS QL MICRO: ABNORMAL /LPF
ERYTHROCYTE [DISTWIDTH] IN BLOOD BY AUTOMATED COUNT: 15.1 % (ref 11.5–14.5)
ERYTHROCYTE [DISTWIDTH] IN BLOOD BY AUTOMATED COUNT: 15.1 % (ref 11.5–14.5)
ERYTHROCYTE [DISTWIDTH] IN BLOOD BY AUTOMATED COUNT: 15.3 % (ref 11.5–14.5)
ERYTHROCYTE [DISTWIDTH] IN BLOOD BY AUTOMATED COUNT: 15.3 % (ref 11.5–14.5)
ERYTHROCYTE [DISTWIDTH] IN BLOOD BY AUTOMATED COUNT: 15.4 % (ref 11.5–14.5)
ERYTHROCYTE [DISTWIDTH] IN BLOOD BY AUTOMATED COUNT: 15.8 % (ref 11.5–14.5)
ERYTHROCYTE [DISTWIDTH] IN BLOOD BY AUTOMATED COUNT: 16 % (ref 11.5–14.5)
ERYTHROCYTE [DISTWIDTH] IN BLOOD BY AUTOMATED COUNT: 16.4 % (ref 11.5–14.5)
GLOBULIN SER CALC-MCNC: 3.2 G/DL (ref 2–4)
GLOBULIN SER CALC-MCNC: 3.3 G/DL (ref 2–4)
GLOBULIN SER CALC-MCNC: 4.4 G/DL (ref 2–4)
GLOBULIN SER CALC-MCNC: 4.8 G/DL (ref 2–4)
GLUCOSE SERPL-MCNC: 115 MG/DL (ref 65–100)
GLUCOSE SERPL-MCNC: 118 MG/DL (ref 65–100)
GLUCOSE SERPL-MCNC: 120 MG/DL (ref 65–100)
GLUCOSE SERPL-MCNC: 124 MG/DL (ref 65–100)
GLUCOSE SERPL-MCNC: 129 MG/DL (ref 65–100)
GLUCOSE SERPL-MCNC: 130 MG/DL (ref 65–100)
GLUCOSE SERPL-MCNC: 142 MG/DL (ref 65–100)
GLUCOSE SERPL-MCNC: 159 MG/DL (ref 65–100)
GLUCOSE SERPL-MCNC: 169 MG/DL (ref 65–100)
GLUCOSE SERPL-MCNC: 179 MG/DL (ref 65–100)
GLUCOSE SERPL-MCNC: 190 MG/DL (ref 65–100)
GLUCOSE SERPL-MCNC: 214 MG/DL (ref 65–100)
GLUCOSE UR STRIP.AUTO-MCNC: NEGATIVE MG/DL
HCT VFR BLD AUTO: 31.8 % (ref 35–47)
HCT VFR BLD AUTO: 34.3 % (ref 35–47)
HCT VFR BLD AUTO: 35.7 % (ref 35–47)
HCT VFR BLD AUTO: 36.9 % (ref 35–47)
HCT VFR BLD AUTO: 37.1 % (ref 35–47)
HCT VFR BLD AUTO: 37.7 % (ref 35–47)
HCT VFR BLD AUTO: 40 % (ref 35–47)
HCT VFR BLD AUTO: 40.1 % (ref 35–47)
HGB BLD-MCNC: 10.2 G/DL (ref 11.5–16)
HGB BLD-MCNC: 11.5 G/DL (ref 11.5–16)
HGB BLD-MCNC: 12 G/DL (ref 11.5–16)
HGB BLD-MCNC: 12.3 G/DL (ref 11.5–16)
HGB BLD-MCNC: 12.6 G/DL (ref 11.5–16)
HGB BLD-MCNC: 12.6 G/DL (ref 11.5–16)
HGB BLD-MCNC: 12.9 G/DL (ref 11.5–16)
HGB BLD-MCNC: 13.1 G/DL (ref 11.5–16)
HGB UR QL STRIP: ABNORMAL
HGB UR QL STRIP: ABNORMAL
HGB UR QL STRIP: NEGATIVE
HYALINE CASTS URNS QL MICRO: ABNORMAL /LPF (ref 0–5)
IMM GRANULOCYTES # BLD AUTO: 0 K/UL (ref 0–0.04)
IMM GRANULOCYTES # BLD AUTO: 0.1 K/UL (ref 0–0.04)
IMM GRANULOCYTES # BLD AUTO: 0.1 K/UL (ref 0–0.04)
IMM GRANULOCYTES NFR BLD AUTO: 0 % (ref 0–0.5)
IMM GRANULOCYTES NFR BLD AUTO: 1 % (ref 0–0.5)
IMM GRANULOCYTES NFR BLD AUTO: 1 % (ref 0–0.5)
INTERPRETATION: ABNORMAL
KETONES UR QL STRIP.AUTO: ABNORMAL MG/DL
KETONES UR QL STRIP.AUTO: NEGATIVE MG/DL
KETONES UR QL STRIP.AUTO: NEGATIVE MG/DL
LACTATE SERPL-SCNC: 1.4 MMOL/L (ref 0.4–2)
LACTATE SERPL-SCNC: 2 MMOL/L (ref 0.4–2)
LACTATE SERPL-SCNC: 2.5 MMOL/L (ref 0.4–2)
LEUKOCYTE ESTERASE UR QL STRIP.AUTO: ABNORMAL
LVFS 2D: 8.5 %
LVOT MG: 1.81 MMHG
LVOT MV: 0.64 CM/S
LVSV (TEICH): 6.63 ML
LYMPHOCYTES # BLD: 0.6 K/UL (ref 0.8–3.5)
LYMPHOCYTES # BLD: 0.6 K/UL (ref 0.8–3.5)
LYMPHOCYTES # BLD: 2 K/UL (ref 0.8–3.5)
LYMPHOCYTES # BLD: 3 K/UL (ref 0.8–3.5)
LYMPHOCYTES # BLD: 3.4 K/UL (ref 0.8–3.5)
LYMPHOCYTES # BLD: 3.9 K/UL (ref 0.8–3.5)
LYMPHOCYTES NFR BLD: 2 % (ref 12–49)
LYMPHOCYTES NFR BLD: 2 % (ref 12–49)
LYMPHOCYTES NFR BLD: 21 % (ref 12–49)
LYMPHOCYTES NFR BLD: 24 % (ref 12–49)
LYMPHOCYTES NFR BLD: 29 % (ref 12–49)
LYMPHOCYTES NFR BLD: 9 % (ref 12–49)
MAGNESIUM SERPL-MCNC: 1.7 MG/DL (ref 1.6–2.4)
MAGNESIUM SERPL-MCNC: 1.9 MG/DL (ref 1.6–2.4)
MAGNESIUM SERPL-MCNC: 2.2 MG/DL (ref 1.6–2.4)
MAGNESIUM SERPL-MCNC: 2.2 MG/DL (ref 1.6–2.4)
MAGNESIUM SERPL-MCNC: 2.4 MG/DL (ref 1.6–2.4)
MCH RBC QN AUTO: 27.9 PG (ref 26–34)
MCH RBC QN AUTO: 28.3 PG (ref 26–34)
MCH RBC QN AUTO: 28.4 PG (ref 26–34)
MCH RBC QN AUTO: 28.5 PG (ref 26–34)
MCH RBC QN AUTO: 28.5 PG (ref 26–34)
MCH RBC QN AUTO: 28.6 PG (ref 26–34)
MCH RBC QN AUTO: 28.8 PG (ref 26–34)
MCH RBC QN AUTO: 29.2 PG (ref 26–34)
MCHC RBC AUTO-ENTMCNC: 32.1 G/DL (ref 30–36.5)
MCHC RBC AUTO-ENTMCNC: 32.2 G/DL (ref 30–36.5)
MCHC RBC AUTO-ENTMCNC: 32.6 G/DL (ref 30–36.5)
MCHC RBC AUTO-ENTMCNC: 32.8 G/DL (ref 30–36.5)
MCHC RBC AUTO-ENTMCNC: 33.5 G/DL (ref 30–36.5)
MCHC RBC AUTO-ENTMCNC: 33.6 G/DL (ref 30–36.5)
MCHC RBC AUTO-ENTMCNC: 34 G/DL (ref 30–36.5)
MCHC RBC AUTO-ENTMCNC: 34.1 G/DL (ref 30–36.5)
MCV RBC AUTO: 81.8 FL (ref 80–99)
MCV RBC AUTO: 83.2 FL (ref 80–99)
MCV RBC AUTO: 84.7 FL (ref 80–99)
MCV RBC AUTO: 85.2 FL (ref 80–99)
MCV RBC AUTO: 87 FL (ref 80–99)
MCV RBC AUTO: 88.3 FL (ref 80–99)
MCV RBC AUTO: 88.7 FL (ref 80–99)
MCV RBC AUTO: 91.1 FL (ref 80–99)
METAMYELOCYTES NFR BLD MANUAL: 1 %
MONOCYTES # BLD: 0 K/UL (ref 0–1)
MONOCYTES # BLD: 0.2 K/UL (ref 0–1)
MONOCYTES # BLD: 0.6 K/UL (ref 0–1)
MONOCYTES # BLD: 0.7 K/UL (ref 0–1)
MONOCYTES # BLD: 1.2 K/UL (ref 0–1)
MONOCYTES # BLD: 1.4 K/UL (ref 0–1)
MONOCYTES NFR BLD: 0 % (ref 5–13)
MONOCYTES NFR BLD: 1 % (ref 5–13)
MONOCYTES NFR BLD: 2 % (ref 5–13)
MONOCYTES NFR BLD: 7 % (ref 5–13)
MONOCYTES NFR BLD: 8 % (ref 5–13)
MONOCYTES NFR BLD: 8 % (ref 5–13)
MV DEC SLOPE: 4.7
NEUTS BAND NFR BLD MANUAL: 17 %
NEUTS BAND NFR BLD MANUAL: 7 %
NEUTS BAND NFR BLD MANUAL: 8 %
NEUTS SEG # BLD: 11.1 K/UL (ref 1.8–8)
NEUTS SEG # BLD: 11.2 K/UL (ref 1.8–8)
NEUTS SEG # BLD: 19.9 K/UL (ref 1.8–8)
NEUTS SEG # BLD: 28 K/UL (ref 1.8–8)
NEUTS SEG # BLD: 29.9 K/UL (ref 1.8–8)
NEUTS SEG # BLD: 6.6 K/UL (ref 1.8–8)
NEUTS SEG NFR BLD: 62 % (ref 32–75)
NEUTS SEG NFR BLD: 67 % (ref 32–75)
NEUTS SEG NFR BLD: 70 % (ref 32–75)
NEUTS SEG NFR BLD: 73 % (ref 32–75)
NEUTS SEG NFR BLD: 88 % (ref 32–75)
NEUTS SEG NFR BLD: 90 % (ref 32–75)
NITRITE UR QL STRIP.AUTO: NEGATIVE
NITRITE UR QL STRIP.AUTO: POSITIVE
NITRITE UR QL STRIP.AUTO: POSITIVE
NRBC # BLD: 0 K/UL (ref 0–0.01)
NRBC # BLD: 0.02 K/UL (ref 0–0.01)
NRBC BLD-RTO: 0 PER 100 WBC
NRBC BLD-RTO: 0.1 PER 100 WBC
P SHIGELLOIDES DNA STL QL NAA+PROBE: NEGATIVE
P-R INTERVAL, ECG05: 158 MS
P-R INTERVAL, ECG05: 160 MS
PH UR STRIP: 6 [PH] (ref 5–8)
PH UR STRIP: 6.5 [PH] (ref 5–8)
PH UR STRIP: 7 [PH] (ref 5–8)
PHOSPHATE SERPL-MCNC: 1.7 MG/DL (ref 2.6–4.7)
PHOSPHATE SERPL-MCNC: 1.7 MG/DL (ref 2.6–4.7)
PHOSPHATE SERPL-MCNC: 2.4 MG/DL (ref 2.6–4.7)
PLATELET # BLD AUTO: 169 K/UL (ref 150–400)
PLATELET # BLD AUTO: 176 K/UL (ref 150–400)
PLATELET # BLD AUTO: 205 K/UL (ref 150–400)
PLATELET # BLD AUTO: 300 K/UL (ref 150–400)
PLATELET # BLD AUTO: 307 K/UL (ref 150–400)
PLATELET # BLD AUTO: 308 K/UL (ref 150–400)
PLATELET # BLD AUTO: 324 K/UL (ref 150–400)
PLATELET # BLD AUTO: 328 K/UL (ref 150–400)
PLATELET COMMENTS,PCOM: ABNORMAL
PMV BLD AUTO: 10.1 FL (ref 8.9–12.9)
PMV BLD AUTO: 10.5 FL (ref 8.9–12.9)
PMV BLD AUTO: 10.5 FL (ref 8.9–12.9)
PMV BLD AUTO: 10.7 FL (ref 8.9–12.9)
PMV BLD AUTO: 10.8 FL (ref 8.9–12.9)
PMV BLD AUTO: 10.8 FL (ref 8.9–12.9)
PMV BLD AUTO: 11 FL (ref 8.9–12.9)
PMV BLD AUTO: 11.3 FL (ref 8.9–12.9)
POTASSIUM SERPL-SCNC: 2.9 MMOL/L (ref 3.5–5.1)
POTASSIUM SERPL-SCNC: 3 MMOL/L (ref 3.5–5.1)
POTASSIUM SERPL-SCNC: 3.2 MMOL/L (ref 3.5–5.1)
POTASSIUM SERPL-SCNC: 3.4 MMOL/L (ref 3.5–5.1)
POTASSIUM SERPL-SCNC: 3.4 MMOL/L (ref 3.5–5.1)
POTASSIUM SERPL-SCNC: 3.5 MMOL/L (ref 3.5–5.1)
POTASSIUM SERPL-SCNC: 3.6 MMOL/L (ref 3.5–5.1)
POTASSIUM SERPL-SCNC: 3.7 MMOL/L (ref 3.5–5.1)
POTASSIUM SERPL-SCNC: 4 MMOL/L (ref 3.5–5.1)
PROT SERPL-MCNC: 4.7 G/DL (ref 6.4–8.2)
PROT SERPL-MCNC: 5.1 G/DL (ref 6.4–8.2)
PROT SERPL-MCNC: 7.4 G/DL (ref 6.4–8.2)
PROT SERPL-MCNC: 7.7 G/DL (ref 6.4–8.2)
PROT UR STRIP-MCNC: 30 MG/DL
Q-T INTERVAL, ECG07: 260 MS
Q-T INTERVAL, ECG07: 322 MS
Q-T INTERVAL, ECG07: 342 MS
Q-T INTERVAL, ECG07: 354 MS
QRS DURATION, ECG06: 68 MS
QRS DURATION, ECG06: 72 MS
QRS DURATION, ECG06: 74 MS
QRS DURATION, ECG06: 76 MS
QTC CALCULATION (BEZET), ECG08: 411 MS
QTC CALCULATION (BEZET), ECG08: 425 MS
QTC CALCULATION (BEZET), ECG08: 452 MS
QTC CALCULATION (BEZET), ECG08: 496 MS
RBC # BLD AUTO: 3.49 M/UL (ref 3.8–5.2)
RBC # BLD AUTO: 4.05 M/UL (ref 3.8–5.2)
RBC # BLD AUTO: 4.19 M/UL (ref 3.8–5.2)
RBC # BLD AUTO: 4.27 M/UL (ref 3.8–5.2)
RBC # BLD AUTO: 4.46 M/UL (ref 3.8–5.2)
RBC # BLD AUTO: 4.51 M/UL (ref 3.8–5.2)
RBC # BLD AUTO: 4.52 M/UL (ref 3.8–5.2)
RBC # BLD AUTO: 4.6 M/UL (ref 3.8–5.2)
RBC #/AREA URNS HPF: ABNORMAL /HPF (ref 0–5)
RBC MORPH BLD: ABNORMAL
SALMONELLA SPECIES, DNA: NEGATIVE
SAMPLES BEING HELD,HOLD: NORMAL
SERVICE CMNT-IMP: NORMAL
SHIGA TOXIN PRODUCING, DNA: NEGATIVE
SHIGELLA SP+EIEC IPAH STL QL NAA+PROBE: NEGATIVE
SODIUM SERPL-SCNC: 131 MMOL/L (ref 136–145)
SODIUM SERPL-SCNC: 131 MMOL/L (ref 136–145)
SODIUM SERPL-SCNC: 132 MMOL/L (ref 136–145)
SODIUM SERPL-SCNC: 134 MMOL/L (ref 136–145)
SODIUM SERPL-SCNC: 135 MMOL/L (ref 136–145)
SODIUM SERPL-SCNC: 135 MMOL/L (ref 136–145)
SODIUM SERPL-SCNC: 136 MMOL/L (ref 136–145)
SODIUM SERPL-SCNC: 138 MMOL/L (ref 136–145)
SODIUM SERPL-SCNC: 139 MMOL/L (ref 136–145)
SODIUM SERPL-SCNC: 139 MMOL/L (ref 136–145)
SP GR UR REFRACTOMETRY: 1.02 (ref 1–1.03)
SP GR UR REFRACTOMETRY: 1.02 (ref 1–1.03)
SP GR UR REFRACTOMETRY: 1.03 (ref 1–1.03)
TSH SERPL DL<=0.05 MIU/L-ACNC: 1.72 UIU/ML (ref 0.36–3.74)
UA: UC IF INDICATED,UAUC: ABNORMAL
UROBILINOGEN UR QL STRIP.AUTO: 0.2 EU/DL (ref 0.2–1)
UROBILINOGEN UR QL STRIP.AUTO: 1 EU/DL (ref 0.2–1)
UROBILINOGEN UR QL STRIP.AUTO: 1 EU/DL (ref 0.2–1)
VENTRICULAR RATE, ECG03: 143 BPM
VENTRICULAR RATE, ECG03: 182 BPM
VENTRICULAR RATE, ECG03: 87 BPM
VENTRICULAR RATE, ECG03: 87 BPM
VIBRIO SPECIES, DNA: NEGATIVE
WBC # BLD AUTO: 10.6 K/UL (ref 3.6–11)
WBC # BLD AUTO: 15.8 K/UL (ref 3.6–11)
WBC # BLD AUTO: 16.5 K/UL (ref 3.6–11)
WBC # BLD AUTO: 22.1 K/UL (ref 3.6–11)
WBC # BLD AUTO: 22.9 K/UL (ref 3.6–11)
WBC # BLD AUTO: 26.9 K/UL (ref 3.6–11)
WBC # BLD AUTO: 29.2 K/UL (ref 3.6–11)
WBC # BLD AUTO: 30.8 K/UL (ref 3.6–11)
WBC MORPH BLD: ABNORMAL
WBC URNS QL MICRO: ABNORMAL /HPF (ref 0–4)
Y. ENTEROCOLITICA, DNA: NEGATIVE
YEAST BUDDING URNS QL: PRESENT
YEAST URNS QL MICRO: PRESENT

## 2020-01-01 PROCEDURE — 84100 ASSAY OF PHOSPHORUS: CPT

## 2020-01-01 PROCEDURE — 0107U C DIFF TOX AG DETCJ IA STOOL: CPT

## 2020-01-01 PROCEDURE — 0651 HSPC ROUTINE HOME CARE

## 2020-01-01 PROCEDURE — 74011250636 HC RX REV CODE- 250/636: Performed by: INTERNAL MEDICINE

## 2020-01-01 PROCEDURE — G0299 HHS/HOSPICE OF RN EA 15 MIN: HCPCS

## 2020-01-01 PROCEDURE — 80048 BASIC METABOLIC PNL TOTAL CA: CPT

## 2020-01-01 PROCEDURE — T4523 ADULT SIZE BRIEF/DIAPER LG: HCPCS

## 2020-01-01 PROCEDURE — 97535 SELF CARE MNGMENT TRAINING: CPT | Performed by: OCCUPATIONAL THERAPIST

## 2020-01-01 PROCEDURE — HOSPICE MEDICATION HC HH HOSPICE MEDICATION

## 2020-01-01 PROCEDURE — 70450 CT HEAD/BRAIN W/O DYE: CPT

## 2020-01-01 PROCEDURE — 93005 ELECTROCARDIOGRAM TRACING: CPT

## 2020-01-01 PROCEDURE — 77030038269 HC DRN EXT URIN PURWCK BARD -A

## 2020-01-01 PROCEDURE — 74011250637 HC RX REV CODE- 250/637: Performed by: INTERNAL MEDICINE

## 2020-01-01 PROCEDURE — 97162 PT EVAL MOD COMPLEX 30 MIN: CPT

## 2020-01-01 PROCEDURE — A4927 NON-STERILE GLOVES: HCPCS

## 2020-01-01 PROCEDURE — 71045 X-RAY EXAM CHEST 1 VIEW: CPT

## 2020-01-01 PROCEDURE — 74011250636 HC RX REV CODE- 250/636: Performed by: EMERGENCY MEDICINE

## 2020-01-01 PROCEDURE — 74011000258 HC RX REV CODE- 258: Performed by: INTERNAL MEDICINE

## 2020-01-01 PROCEDURE — 65660000000 HC RM CCU STEPDOWN

## 2020-01-01 PROCEDURE — 94760 N-INVAS EAR/PLS OXIMETRY 1: CPT

## 2020-01-01 PROCEDURE — 80053 COMPREHEN METABOLIC PANEL: CPT

## 2020-01-01 PROCEDURE — 77010033678 HC OXYGEN DAILY

## 2020-01-01 PROCEDURE — 83735 ASSAY OF MAGNESIUM: CPT

## 2020-01-01 PROCEDURE — 51702 INSERT TEMP BLADDER CATH: CPT

## 2020-01-01 PROCEDURE — 74011000258 HC RX REV CODE- 258: Performed by: EMERGENCY MEDICINE

## 2020-01-01 PROCEDURE — 74011250636 HC RX REV CODE- 250/636: Performed by: HOSPITALIST

## 2020-01-01 PROCEDURE — 85025 COMPLETE CBC W/AUTO DIFF WBC: CPT

## 2020-01-01 PROCEDURE — 36415 COLL VENOUS BLD VENIPUNCTURE: CPT

## 2020-01-01 PROCEDURE — 83605 ASSAY OF LACTIC ACID: CPT

## 2020-01-01 PROCEDURE — 74011250636 HC RX REV CODE- 250/636: Performed by: FAMILY MEDICINE

## 2020-01-01 PROCEDURE — 74011000250 HC RX REV CODE- 250: Performed by: INTERNAL MEDICINE

## 2020-01-01 PROCEDURE — 99285 EMERGENCY DEPT VISIT HI MDM: CPT

## 2020-01-01 PROCEDURE — A9270 NON-COVERED ITEM OR SERVICE: HCPCS

## 2020-01-01 PROCEDURE — 97535 SELF CARE MNGMENT TRAINING: CPT

## 2020-01-01 PROCEDURE — 77030005513 HC CATH URETH FOL11 MDII -B

## 2020-01-01 PROCEDURE — 97530 THERAPEUTIC ACTIVITIES: CPT

## 2020-01-01 PROCEDURE — 74011000250 HC RX REV CODE- 250: Performed by: NURSE PRACTITIONER

## 2020-01-01 PROCEDURE — 74011250636 HC RX REV CODE- 250/636: Performed by: NURSE PRACTITIONER

## 2020-01-01 PROCEDURE — T4541 LARGE DISPOSABLE UNDERPAD: HCPCS

## 2020-01-01 PROCEDURE — 87086 URINE CULTURE/COLONY COUNT: CPT

## 2020-01-01 PROCEDURE — 81001 URINALYSIS AUTO W/SCOPE: CPT

## 2020-01-01 PROCEDURE — 87040 BLOOD CULTURE FOR BACTERIA: CPT

## 2020-01-01 PROCEDURE — 77030037878 HC DRSG MEPILEX >48IN BORD MOLN -B

## 2020-01-01 PROCEDURE — 3336500001 HSPC ELECTION

## 2020-01-01 PROCEDURE — 77030041247 HC PROTECTOR HEEL HEELMEDIX MDII -B

## 2020-01-01 PROCEDURE — 74011250636 HC RX REV CODE- 250/636

## 2020-01-01 PROCEDURE — 96366 THER/PROPH/DIAG IV INF ADDON: CPT

## 2020-01-01 PROCEDURE — 74011636320 HC RX REV CODE- 636/320: Performed by: INTERNAL MEDICINE

## 2020-01-01 PROCEDURE — 74176 CT ABD & PELVIS W/O CONTRAST: CPT

## 2020-01-01 PROCEDURE — A9286 ANY HYGIENIC ITEM, DEVICE: HCPCS

## 2020-01-01 PROCEDURE — 74011250637 HC RX REV CODE- 250/637

## 2020-01-01 PROCEDURE — G0300 HHS/HOSPICE OF LPN EA 15 MIN: HCPCS

## 2020-01-01 PROCEDURE — 96365 THER/PROPH/DIAG IV INF INIT: CPT

## 2020-01-01 PROCEDURE — 3331090004 HSPC SERVICE INTENSITY ADD-ON

## 2020-01-01 PROCEDURE — 65270000029 HC RM PRIVATE

## 2020-01-01 PROCEDURE — 96375 TX/PRO/DX INJ NEW DRUG ADDON: CPT

## 2020-01-01 PROCEDURE — 85027 COMPLETE CBC AUTOMATED: CPT

## 2020-01-01 PROCEDURE — 96367 TX/PROPH/DG ADDL SEQ IV INF: CPT

## 2020-01-01 PROCEDURE — 74011000250 HC RX REV CODE- 250: Performed by: EMERGENCY MEDICINE

## 2020-01-01 PROCEDURE — 92526 ORAL FUNCTION THERAPY: CPT

## 2020-01-01 PROCEDURE — A6250 SKIN SEAL PROTECT MOISTURIZR: HCPCS

## 2020-01-01 PROCEDURE — G0155 HHCP-SVS OF CSW,EA 15 MIN: HCPCS

## 2020-01-01 PROCEDURE — 87506 IADNA-DNA/RNA PROBE TQ 6-11: CPT

## 2020-01-01 PROCEDURE — 74018 RADEX ABDOMEN 1 VIEW: CPT

## 2020-01-01 PROCEDURE — 74011250637 HC RX REV CODE- 250/637: Performed by: FAMILY MEDICINE

## 2020-01-01 PROCEDURE — 51798 US URINE CAPACITY MEASURE: CPT

## 2020-01-01 PROCEDURE — 93306 TTE W/DOPPLER COMPLETE: CPT

## 2020-01-01 PROCEDURE — 97116 GAIT TRAINING THERAPY: CPT

## 2020-01-01 PROCEDURE — 97165 OT EVAL LOW COMPLEX 30 MIN: CPT | Performed by: OCCUPATIONAL THERAPIST

## 2020-01-01 PROCEDURE — 84443 ASSAY THYROID STIM HORMONE: CPT

## 2020-01-01 PROCEDURE — 92610 EVALUATE SWALLOWING FUNCTION: CPT

## 2020-01-01 PROCEDURE — HHS10554 SHAMPOO/BODY WASH 8 OZ ALOE VESTA

## 2020-01-01 PROCEDURE — 74011000258 HC RX REV CODE- 258: Performed by: NURSE PRACTITIONER

## 2020-01-01 PROCEDURE — 74177 CT ABD & PELVIS W/CONTRAST: CPT

## 2020-01-01 RX ORDER — SODIUM CHLORIDE 9 MG/ML
125 INJECTION, SOLUTION INTRAVENOUS CONTINUOUS
Status: DISCONTINUED | OUTPATIENT
Start: 2020-01-01 | End: 2020-01-01

## 2020-01-01 RX ORDER — SODIUM CHLORIDE 9 MG/ML
75 INJECTION, SOLUTION INTRAVENOUS CONTINUOUS
Status: DISCONTINUED | OUTPATIENT
Start: 2020-01-01 | End: 2020-01-01

## 2020-01-01 RX ORDER — METRONIDAZOLE 500 MG/100ML
500 INJECTION, SOLUTION INTRAVENOUS EVERY 8 HOURS
Status: DISCONTINUED | OUTPATIENT
Start: 2020-01-01 | End: 2020-01-01 | Stop reason: HOSPADM

## 2020-01-01 RX ORDER — ONDANSETRON 2 MG/ML
4 INJECTION INTRAMUSCULAR; INTRAVENOUS
Status: DISCONTINUED | OUTPATIENT
Start: 2020-01-01 | End: 2020-01-01 | Stop reason: HOSPADM

## 2020-01-01 RX ORDER — BARIUM SULFATE 20 MG/ML
900 SUSPENSION ORAL
Status: DISCONTINUED | OUTPATIENT
Start: 2020-01-01 | End: 2020-01-01

## 2020-01-01 RX ORDER — SODIUM CHLORIDE 9 MG/ML
50 INJECTION, SOLUTION INTRAVENOUS CONTINUOUS
Status: DISCONTINUED | OUTPATIENT
Start: 2020-01-01 | End: 2020-01-01

## 2020-01-01 RX ORDER — DILTIAZEM HYDROCHLORIDE 30 MG/1
TABLET, FILM COATED ORAL
Status: COMPLETED
Start: 2020-01-01 | End: 2020-01-01

## 2020-01-01 RX ORDER — VANCOMYCIN HYDROCHLORIDE 250 MG/5ML
125 POWDER, FOR SOLUTION ORAL EVERY 6 HOURS
Status: DISCONTINUED | OUTPATIENT
Start: 2020-01-01 | End: 2020-01-01

## 2020-01-01 RX ORDER — DILTIAZEM HYDROCHLORIDE 30 MG/1
30 TABLET, FILM COATED ORAL 4 TIMES DAILY
Status: DISCONTINUED | OUTPATIENT
Start: 2020-01-01 | End: 2020-01-01

## 2020-01-01 RX ORDER — SODIUM CHLORIDE 9 MG/ML
100 INJECTION, SOLUTION INTRAVENOUS CONTINUOUS
Status: DISCONTINUED | OUTPATIENT
Start: 2020-01-01 | End: 2020-01-01

## 2020-01-01 RX ORDER — BALSAM PERU/CASTOR OIL
OINTMENT (GRAM) TOPICAL 3 TIMES DAILY
Status: DISCONTINUED | OUTPATIENT
Start: 2020-01-01 | End: 2020-01-01

## 2020-01-01 RX ORDER — LANOLIN ALCOHOL/MO/W.PET/CERES
5 CREAM (GRAM) TOPICAL
Status: DISCONTINUED | OUTPATIENT
Start: 2020-01-01 | End: 2020-01-01 | Stop reason: HOSPADM

## 2020-01-01 RX ORDER — BALSAM PERU/CASTOR OIL
OINTMENT (GRAM) TOPICAL 3 TIMES DAILY
Status: DISCONTINUED | OUTPATIENT
Start: 2020-01-01 | End: 2020-01-01 | Stop reason: HOSPADM

## 2020-01-01 RX ORDER — SODIUM CHLORIDE 9 MG/ML
100 INJECTION, SOLUTION INTRAVENOUS CONTINUOUS
Status: DISPENSED | OUTPATIENT
Start: 2020-01-01 | End: 2020-01-01

## 2020-01-01 RX ORDER — POTASSIUM CHLORIDE 750 MG/1
40 TABLET, FILM COATED, EXTENDED RELEASE ORAL
Status: COMPLETED | OUTPATIENT
Start: 2020-01-01 | End: 2020-01-01

## 2020-01-01 RX ORDER — GUAIFENESIN 100 MG/5ML
81 LIQUID (ML) ORAL DAILY
Status: SHIPPED | COMMUNITY
Start: 2020-01-01 | End: 2020-01-01 | Stop reason: SDUPTHER

## 2020-01-01 RX ORDER — SODIUM CHLORIDE 0.9 % (FLUSH) 0.9 %
5-40 SYRINGE (ML) INJECTION AS NEEDED
Status: DISCONTINUED | OUTPATIENT
Start: 2020-01-01 | End: 2020-01-01 | Stop reason: HOSPADM

## 2020-01-01 RX ORDER — LEVOFLOXACIN 750 MG/1
750 TABLET ORAL EVERY 24 HOURS
Status: COMPLETED | OUTPATIENT
Start: 2020-01-01 | End: 2020-01-01

## 2020-01-01 RX ORDER — ENOXAPARIN SODIUM 100 MG/ML
40 INJECTION SUBCUTANEOUS EVERY 24 HOURS
Status: DISCONTINUED | OUTPATIENT
Start: 2020-01-01 | End: 2020-01-01

## 2020-01-01 RX ORDER — HEPARIN SODIUM 5000 [USP'U]/ML
5000 INJECTION, SOLUTION INTRAVENOUS; SUBCUTANEOUS EVERY 12 HOURS
Status: DISCONTINUED | OUTPATIENT
Start: 2020-01-01 | End: 2020-01-01 | Stop reason: HOSPADM

## 2020-01-01 RX ORDER — LORAZEPAM 2 MG/ML
0.5 INJECTION INTRAMUSCULAR ONCE
Status: COMPLETED | OUTPATIENT
Start: 2020-01-01 | End: 2020-01-01

## 2020-01-01 RX ORDER — METOPROLOL TARTRATE 25 MG/1
12.5 TABLET, FILM COATED ORAL 2 TIMES DAILY
Status: DISCONTINUED | OUTPATIENT
Start: 2020-01-01 | End: 2020-01-01 | Stop reason: HOSPADM

## 2020-01-01 RX ORDER — LISINOPRIL 20 MG/1
20 TABLET ORAL DAILY
Status: DISCONTINUED | OUTPATIENT
Start: 2020-01-01 | End: 2020-01-01

## 2020-01-01 RX ORDER — AMLODIPINE BESYLATE 5 MG/1
5 TABLET ORAL EVERY EVENING
Status: DISCONTINUED | OUTPATIENT
Start: 2020-01-01 | End: 2020-01-01

## 2020-01-01 RX ORDER — CHOLECALCIFEROL (VITAMIN D3) 125 MCG
5 CAPSULE ORAL
COMMUNITY
End: 2020-01-01 | Stop reason: SDUPTHER

## 2020-01-01 RX ORDER — ACETAMINOPHEN 325 MG/1
650 TABLET ORAL
Status: DISCONTINUED | OUTPATIENT
Start: 2020-01-01 | End: 2020-01-01 | Stop reason: HOSPADM

## 2020-01-01 RX ORDER — VANCOMYCIN HYDROCHLORIDE 125 MG/1
125 CAPSULE ORAL 4 TIMES DAILY
COMMUNITY
Start: 2020-01-01 | End: 2020-01-01

## 2020-01-01 RX ORDER — AMLODIPINE BESYLATE 5 MG/1
5 TABLET ORAL DAILY
Status: DISCONTINUED | OUTPATIENT
Start: 2020-01-01 | End: 2020-01-01

## 2020-01-01 RX ORDER — METOPROLOL TARTRATE 5 MG/5ML
2.5 INJECTION INTRAVENOUS
Status: DISCONTINUED | OUTPATIENT
Start: 2020-01-01 | End: 2020-01-01 | Stop reason: HOSPADM

## 2020-01-01 RX ORDER — DILTIAZEM HYDROCHLORIDE 180 MG/1
180 CAPSULE, COATED, EXTENDED RELEASE ORAL DAILY
Qty: 30 CAP | Refills: 2 | Status: SHIPPED | OUTPATIENT
Start: 2020-01-01 | End: 2020-01-01

## 2020-01-01 RX ORDER — METOPROLOL TARTRATE 5 MG/5ML
5 INJECTION INTRAVENOUS
Status: COMPLETED | OUTPATIENT
Start: 2020-01-01 | End: 2020-01-01

## 2020-01-01 RX ORDER — VANCOMYCIN HYDROCHLORIDE 250 MG/1
125 CAPSULE ORAL EVERY 6 HOURS
COMMUNITY
Start: 2020-01-01 | End: 2020-01-01

## 2020-01-01 RX ORDER — AMIODARONE HYDROCHLORIDE 200 MG/1
200 TABLET ORAL 3 TIMES DAILY
Status: DISCONTINUED | OUTPATIENT
Start: 2020-01-01 | End: 2020-01-01 | Stop reason: HOSPADM

## 2020-01-01 RX ORDER — AMOXICILLIN 250 MG
1 CAPSULE ORAL
Status: DISCONTINUED | OUTPATIENT
Start: 2020-01-01 | End: 2020-01-01 | Stop reason: HOSPADM

## 2020-01-01 RX ORDER — DILTIAZEM HYDROCHLORIDE 120 MG/1
CAPSULE, COATED, EXTENDED RELEASE ORAL
Status: DISCONTINUED
Start: 2020-01-01 | End: 2020-01-01

## 2020-01-01 RX ORDER — FUROSEMIDE 10 MG/ML
INJECTION INTRAMUSCULAR; INTRAVENOUS
Status: COMPLETED
Start: 2020-01-01 | End: 2020-01-01

## 2020-01-01 RX ORDER — METOPROLOL TARTRATE 5 MG/5ML
2.5 INJECTION INTRAVENOUS EVERY 6 HOURS
Status: DISCONTINUED | OUTPATIENT
Start: 2020-01-01 | End: 2020-01-01

## 2020-01-01 RX ORDER — POTASSIUM CHLORIDE 750 MG/1
20 TABLET, FILM COATED, EXTENDED RELEASE ORAL
Status: COMPLETED | OUTPATIENT
Start: 2020-01-01 | End: 2020-01-01

## 2020-01-01 RX ORDER — AMIODARONE HYDROCHLORIDE 200 MG/1
200 TABLET ORAL 3 TIMES DAILY
Qty: 90 TAB | Refills: 0 | Status: SHIPPED
Start: 2020-01-01

## 2020-01-01 RX ORDER — LEVOFLOXACIN 750 MG/1
750 TABLET ORAL EVERY 24 HOURS
Qty: 2 TAB | Refills: 0 | Status: SHIPPED | OUTPATIENT
Start: 2020-01-01 | End: 2020-01-01

## 2020-01-01 RX ORDER — VANCOMYCIN HYDROCHLORIDE 250 MG/5ML
500 POWDER, FOR SOLUTION ORAL EVERY 6 HOURS
Qty: 320 ML | Refills: 0 | Status: SHIPPED
Start: 2020-01-01 | End: 2020-01-01

## 2020-01-01 RX ORDER — BALSAM PERU/CASTOR OIL
OINTMENT (GRAM) TOPICAL 3 TIMES DAILY
Qty: 1 TUBE | Refills: 0 | Status: SHIPPED
Start: 2020-01-01

## 2020-01-01 RX ORDER — PRAVASTATIN SODIUM 10 MG/1
20 TABLET ORAL
Status: DISCONTINUED | OUTPATIENT
Start: 2020-01-01 | End: 2020-01-01 | Stop reason: HOSPADM

## 2020-01-01 RX ORDER — SODIUM,POTASSIUM PHOSPHATES 280-250MG
2 POWDER IN PACKET (EA) ORAL ONCE
Status: COMPLETED | OUTPATIENT
Start: 2020-01-01 | End: 2020-01-01

## 2020-01-01 RX ORDER — METOPROLOL TARTRATE 5 MG/5ML
2.5 INJECTION INTRAVENOUS EVERY 4 HOURS
Status: DISCONTINUED | OUTPATIENT
Start: 2020-01-01 | End: 2020-01-01

## 2020-01-01 RX ORDER — HEPARIN SODIUM 5000 [USP'U]/ML
5000 INJECTION, SOLUTION INTRAVENOUS; SUBCUTANEOUS EVERY 8 HOURS
Status: DISCONTINUED | OUTPATIENT
Start: 2020-01-01 | End: 2020-01-01 | Stop reason: HOSPADM

## 2020-01-01 RX ORDER — DILTIAZEM HYDROCHLORIDE 30 MG/1
TABLET, FILM COATED ORAL
Status: DISCONTINUED
Start: 2020-01-01 | End: 2020-01-01

## 2020-01-01 RX ORDER — METOPROLOL TARTRATE 25 MG/1
25 TABLET, FILM COATED ORAL 2 TIMES DAILY
Status: DISCONTINUED | OUTPATIENT
Start: 2020-01-01 | End: 2020-01-01

## 2020-01-01 RX ORDER — LISINOPRIL 20 MG/1
20 TABLET ORAL DAILY
Status: DISCONTINUED | OUTPATIENT
Start: 2020-01-01 | End: 2020-01-01 | Stop reason: HOSPADM

## 2020-01-01 RX ORDER — ONDANSETRON 2 MG/ML
4 INJECTION INTRAMUSCULAR; INTRAVENOUS
Qty: 1 ML | Refills: 0 | Status: SHIPPED
Start: 2020-01-01 | End: 2020-01-01

## 2020-01-01 RX ORDER — SODIUM CHLORIDE 900 MG/100ML
INJECTION INTRAVENOUS
Status: DISCONTINUED
Start: 2020-01-01 | End: 2020-01-01 | Stop reason: WASHOUT

## 2020-01-01 RX ORDER — KETOROLAC TROMETHAMINE 30 MG/ML
15 INJECTION, SOLUTION INTRAMUSCULAR; INTRAVENOUS ONCE
Status: COMPLETED | OUTPATIENT
Start: 2020-01-01 | End: 2020-01-01

## 2020-01-01 RX ORDER — METOPROLOL TARTRATE 25 MG/1
12.5 TABLET, FILM COATED ORAL 2 TIMES DAILY
Qty: 60 TAB | Refills: 0 | Status: SHIPPED
Start: 2020-01-01

## 2020-01-01 RX ORDER — FUROSEMIDE 10 MG/ML
20 INJECTION INTRAMUSCULAR; INTRAVENOUS ONCE
Status: COMPLETED | OUTPATIENT
Start: 2020-01-01 | End: 2020-01-01

## 2020-01-01 RX ORDER — DILTIAZEM HYDROCHLORIDE 5 MG/ML
10 INJECTION INTRAVENOUS
Status: COMPLETED | OUTPATIENT
Start: 2020-01-01 | End: 2020-01-01

## 2020-01-01 RX ORDER — SODIUM CHLORIDE 0.9 % (FLUSH) 0.9 %
5-40 SYRINGE (ML) INJECTION EVERY 8 HOURS
Status: DISCONTINUED | OUTPATIENT
Start: 2020-01-01 | End: 2020-01-01 | Stop reason: HOSPADM

## 2020-01-01 RX ORDER — SODIUM CHLORIDE 0.9 % (FLUSH) 0.9 %
10 SYRINGE (ML) INJECTION
Status: COMPLETED | OUTPATIENT
Start: 2020-01-01 | End: 2020-01-01

## 2020-01-01 RX ORDER — POLYETHYLENE GLYCOL 3350 17 G/17G
17 POWDER, FOR SOLUTION ORAL 2 TIMES DAILY
Status: DISCONTINUED | OUTPATIENT
Start: 2020-01-01 | End: 2020-01-01

## 2020-01-01 RX ORDER — AMOXICILLIN 250 MG
1 CAPSULE ORAL
Qty: 30 TAB | Refills: 1 | Status: SHIPPED | OUTPATIENT
Start: 2020-01-01 | End: 2020-01-01

## 2020-01-01 RX ORDER — HEPARIN SODIUM 5000 [USP'U]/ML
5000 INJECTION, SOLUTION INTRAVENOUS; SUBCUTANEOUS EVERY 12 HOURS
Qty: 28 ML | Refills: 0 | Status: SHIPPED | OUTPATIENT
Start: 2020-01-01 | End: 2020-01-01

## 2020-01-01 RX ORDER — HYDROCHLOROTHIAZIDE 25 MG/1
12.5 TABLET ORAL DAILY
Status: DISCONTINUED | OUTPATIENT
Start: 2020-01-01 | End: 2020-01-01

## 2020-01-01 RX ORDER — GUAIFENESIN 100 MG/5ML
81 LIQUID (ML) ORAL DAILY
Status: DISCONTINUED | OUTPATIENT
Start: 2020-01-01 | End: 2020-01-01 | Stop reason: HOSPADM

## 2020-01-01 RX ORDER — POTASSIUM CHLORIDE 7.45 MG/ML
10 INJECTION INTRAVENOUS ONCE
Status: COMPLETED | OUTPATIENT
Start: 2020-01-01 | End: 2020-01-01

## 2020-01-01 RX ORDER — BALSAM PERU/CASTOR OIL
OINTMENT (GRAM) TOPICAL EVERY 6 HOURS
Status: DISCONTINUED | OUTPATIENT
Start: 2020-01-01 | End: 2020-01-01 | Stop reason: HOSPADM

## 2020-01-01 RX ORDER — SODIUM CHLORIDE 9 MG/ML
30 INJECTION, SOLUTION INTRAVENOUS ONCE
Status: COMPLETED | OUTPATIENT
Start: 2020-01-01 | End: 2020-01-01

## 2020-01-01 RX ORDER — VANCOMYCIN HYDROCHLORIDE 250 MG/5ML
500 POWDER, FOR SOLUTION ORAL EVERY 6 HOURS
Status: DISCONTINUED | OUTPATIENT
Start: 2020-01-01 | End: 2020-01-01 | Stop reason: HOSPADM

## 2020-01-01 RX ORDER — DILTIAZEM HYDROCHLORIDE 180 MG/1
180 CAPSULE, COATED, EXTENDED RELEASE ORAL DAILY
Status: DISCONTINUED | OUTPATIENT
Start: 2020-01-01 | End: 2020-01-01 | Stop reason: HOSPADM

## 2020-01-01 RX ORDER — VANCOMYCIN HYDROCHLORIDE 250 MG/5ML
250 POWDER, FOR SOLUTION ORAL EVERY 6 HOURS
Status: DISCONTINUED | OUTPATIENT
Start: 2020-01-01 | End: 2020-01-01

## 2020-01-01 RX ORDER — DILTIAZEM HYDROCHLORIDE 30 MG/1
60 TABLET, FILM COATED ORAL 4 TIMES DAILY
Status: DISCONTINUED | OUTPATIENT
Start: 2020-01-01 | End: 2020-01-01

## 2020-01-01 RX ORDER — MAGNESIUM SULFATE 1 G/100ML
1 INJECTION INTRAVENOUS ONCE
Status: COMPLETED | OUTPATIENT
Start: 2020-01-01 | End: 2020-01-01

## 2020-01-01 RX ORDER — POTASSIUM CHLORIDE 7.45 MG/ML
10 INJECTION INTRAVENOUS
Status: COMPLETED | OUTPATIENT
Start: 2020-01-01 | End: 2020-01-01

## 2020-01-01 RX ORDER — PRAVASTATIN SODIUM 20 MG/1
20 TABLET ORAL
Qty: 30 TAB | Refills: 1 | Status: SHIPPED | OUTPATIENT
Start: 2020-01-01 | End: 2020-03-14

## 2020-01-01 RX ORDER — ESCITALOPRAM OXALATE 5 MG/1
5 TABLET ORAL DAILY
COMMUNITY
End: 2020-01-01

## 2020-01-01 RX ADMIN — ASPIRIN 81 MG CHEWABLE TABLET 81 MG: 81 TABLET CHEWABLE at 08:47

## 2020-01-01 RX ADMIN — PRAVASTATIN SODIUM 20 MG: 10 TABLET ORAL at 21:34

## 2020-01-01 RX ADMIN — METRONIDAZOLE 500 MG: 500 INJECTION, SOLUTION INTRAVENOUS at 05:53

## 2020-01-01 RX ADMIN — CASTOR OIL AND BALSAM, PERU: 788; 87 OINTMENT TOPICAL at 18:00

## 2020-01-01 RX ADMIN — PRAVASTATIN SODIUM 20 MG: 10 TABLET ORAL at 21:41

## 2020-01-01 RX ADMIN — METRONIDAZOLE 500 MG: 500 INJECTION, SOLUTION INTRAVENOUS at 07:44

## 2020-01-01 RX ADMIN — METOPROLOL TARTRATE 12.5 MG: 25 TABLET ORAL at 17:28

## 2020-01-01 RX ADMIN — CEFTRIAXONE 1 G: 1 INJECTION, POWDER, FOR SOLUTION INTRAMUSCULAR; INTRAVENOUS at 15:02

## 2020-01-01 RX ADMIN — METRONIDAZOLE 500 MG: 500 INJECTION, SOLUTION INTRAVENOUS at 21:12

## 2020-01-01 RX ADMIN — HEPARIN SODIUM 5000 UNITS: 5000 INJECTION INTRAVENOUS; SUBCUTANEOUS at 08:10

## 2020-01-01 RX ADMIN — CASTOR OIL AND BALSAM, PERU: 788; 87 OINTMENT TOPICAL at 15:56

## 2020-01-01 RX ADMIN — METRONIDAZOLE 500 MG: 500 INJECTION, SOLUTION INTRAVENOUS at 22:06

## 2020-01-01 RX ADMIN — VANCOMYCIN HYDROCHLORIDE 1000 MG: 1 INJECTION, POWDER, LYOPHILIZED, FOR SOLUTION INTRAVENOUS at 11:54

## 2020-01-01 RX ADMIN — Medication 10 ML: at 13:15

## 2020-01-01 RX ADMIN — AMIODARONE HYDROCHLORIDE 150 MG: 1.5 INJECTION, SOLUTION INTRAVENOUS at 13:15

## 2020-01-01 RX ADMIN — HEPARIN SODIUM 5000 UNITS: 5000 INJECTION INTRAVENOUS; SUBCUTANEOUS at 05:53

## 2020-01-01 RX ADMIN — POLYETHYLENE GLYCOL 3350 17 G: 17 POWDER, FOR SOLUTION ORAL at 09:27

## 2020-01-01 RX ADMIN — DEXTROSE MONOHYDRATE 2.5 MG/HR: 50 INJECTION, SOLUTION INTRAVENOUS at 10:06

## 2020-01-01 RX ADMIN — Medication 10 ML: at 14:55

## 2020-01-01 RX ADMIN — AMIODARONE HYDROCHLORIDE 200 MG: 200 TABLET ORAL at 09:19

## 2020-01-01 RX ADMIN — SODIUM CHLORIDE 1000 ML: 900 INJECTION, SOLUTION INTRAVENOUS at 05:11

## 2020-01-01 RX ADMIN — ASPIRIN 81 MG CHEWABLE TABLET 81 MG: 81 TABLET CHEWABLE at 09:26

## 2020-01-01 RX ADMIN — POLYETHYLENE GLYCOL 3350 17 G: 17 POWDER, FOR SOLUTION ORAL at 17:07

## 2020-01-01 RX ADMIN — POTASSIUM CHLORIDE 10 MEQ: 10 INJECTION, SOLUTION INTRAVENOUS at 22:34

## 2020-01-01 RX ADMIN — PIPERACILLIN AND TAZOBACTAM 3.38 G: 3; .375 INJECTION, POWDER, LYOPHILIZED, FOR SOLUTION INTRAVENOUS at 19:04

## 2020-01-01 RX ADMIN — Medication 5 ML: at 21:23

## 2020-01-01 RX ADMIN — DEXTROSE MONOHYDRATE 15 MG/HR: 50 INJECTION, SOLUTION INTRAVENOUS at 22:06

## 2020-01-01 RX ADMIN — CASTOR OIL AND BALSAM, PERU: 788; 87 OINTMENT TOPICAL at 22:00

## 2020-01-01 RX ADMIN — ACETAMINOPHEN 650 MG: 325 TABLET ORAL at 04:58

## 2020-01-01 RX ADMIN — HEPARIN SODIUM 5000 UNITS: 5000 INJECTION INTRAVENOUS; SUBCUTANEOUS at 21:16

## 2020-01-01 RX ADMIN — CASTOR OIL AND BALSAM, PERU: 788; 87 OINTMENT TOPICAL at 15:35

## 2020-01-01 RX ADMIN — DILTIAZEM HYDROCHLORIDE 180 MG: 180 CAPSULE, COATED, EXTENDED RELEASE ORAL at 14:53

## 2020-01-01 RX ADMIN — MELATONIN 4.5 MG: at 21:41

## 2020-01-01 RX ADMIN — AMIODARONE HYDROCHLORIDE 0.5 MG/MIN: 50 INJECTION, SOLUTION INTRAVENOUS at 04:54

## 2020-01-01 RX ADMIN — DILTIAZEM HYDROCHLORIDE 60 MG: 30 TABLET, FILM COATED ORAL at 12:29

## 2020-01-01 RX ADMIN — NITROGLYCERIN 1 INCH: 20 OINTMENT TOPICAL at 08:09

## 2020-01-01 RX ADMIN — HEPARIN SODIUM 5000 UNITS: 5000 INJECTION INTRAVENOUS; SUBCUTANEOUS at 20:27

## 2020-01-01 RX ADMIN — HEPARIN SODIUM 5000 UNITS: 5000 INJECTION INTRAVENOUS; SUBCUTANEOUS at 20:39

## 2020-01-01 RX ADMIN — ASPIRIN 81 MG CHEWABLE TABLET 81 MG: 81 TABLET CHEWABLE at 09:19

## 2020-01-01 RX ADMIN — METOPROLOL TARTRATE 2.5 MG: 5 INJECTION INTRAVENOUS at 21:02

## 2020-01-01 RX ADMIN — AZITHROMYCIN MONOHYDRATE 500 MG: 500 INJECTION, POWDER, LYOPHILIZED, FOR SOLUTION INTRAVENOUS at 21:53

## 2020-01-01 RX ADMIN — VANCOMYCIN HYDROCHLORIDE 250 MG: KIT at 17:15

## 2020-01-01 RX ADMIN — METRONIDAZOLE 500 MG: 500 INJECTION, SOLUTION INTRAVENOUS at 16:06

## 2020-01-01 RX ADMIN — METOPROLOL TARTRATE 25 MG: 25 TABLET ORAL at 20:25

## 2020-01-01 RX ADMIN — DILTIAZEM HYDROCHLORIDE 180 MG: 180 CAPSULE, COATED, EXTENDED RELEASE ORAL at 08:38

## 2020-01-01 RX ADMIN — PIPERACILLIN AND TAZOBACTAM 3.38 G: 3; .375 INJECTION, POWDER, LYOPHILIZED, FOR SOLUTION INTRAVENOUS at 02:07

## 2020-01-01 RX ADMIN — SODIUM CHLORIDE 75 ML/HR: 900 INJECTION, SOLUTION INTRAVENOUS at 04:27

## 2020-01-01 RX ADMIN — METRONIDAZOLE 500 MG: 500 INJECTION, SOLUTION INTRAVENOUS at 06:23

## 2020-01-01 RX ADMIN — AMIODARONE HYDROCHLORIDE 200 MG: 200 TABLET ORAL at 09:38

## 2020-01-01 RX ADMIN — PIPERACILLIN AND TAZOBACTAM 3.38 G: 3; .375 INJECTION, POWDER, LYOPHILIZED, FOR SOLUTION INTRAVENOUS at 01:24

## 2020-01-01 RX ADMIN — DILTIAZEM HYDROCHLORIDE 60 MG: 30 TABLET, FILM COATED ORAL at 08:41

## 2020-01-01 RX ADMIN — VANCOMYCIN HYDROCHLORIDE 500 MG: KIT at 06:06

## 2020-01-01 RX ADMIN — HEPARIN SODIUM 5000 UNITS: 5000 INJECTION INTRAVENOUS; SUBCUTANEOUS at 09:11

## 2020-01-01 RX ADMIN — AMIODARONE HYDROCHLORIDE 200 MG: 200 TABLET ORAL at 21:42

## 2020-01-01 RX ADMIN — METOPROLOL TARTRATE 12.5 MG: 25 TABLET ORAL at 09:20

## 2020-01-01 RX ADMIN — IOPAMIDOL 100 ML: 755 INJECTION, SOLUTION INTRAVENOUS at 15:19

## 2020-01-01 RX ADMIN — HEPARIN SODIUM 5000 UNITS: 5000 INJECTION INTRAVENOUS; SUBCUTANEOUS at 21:23

## 2020-01-01 RX ADMIN — VANCOMYCIN HYDROCHLORIDE 500 MG: KIT at 14:00

## 2020-01-01 RX ADMIN — METOPROLOL TARTRATE 25 MG: 25 TABLET ORAL at 17:08

## 2020-01-01 RX ADMIN — SODIUM CHLORIDE 50 ML/HR: 900 INJECTION, SOLUTION INTRAVENOUS at 07:58

## 2020-01-01 RX ADMIN — AZITHROMYCIN MONOHYDRATE 500 MG: 500 INJECTION, POWDER, LYOPHILIZED, FOR SOLUTION INTRAVENOUS at 20:45

## 2020-01-01 RX ADMIN — POTASSIUM & SODIUM PHOSPHATES POWDER PACK 280-160-250 MG 2 PACKET: 280-160-250 PACK at 09:00

## 2020-01-01 RX ADMIN — AMIODARONE HYDROCHLORIDE 200 MG: 200 TABLET ORAL at 08:47

## 2020-01-01 RX ADMIN — Medication 10 ML: at 09:38

## 2020-01-01 RX ADMIN — DILTIAZEM HYDROCHLORIDE 10 MG: 5 INJECTION INTRAVENOUS at 09:55

## 2020-01-01 RX ADMIN — AMIODARONE HYDROCHLORIDE 0.5 MG/MIN: 50 INJECTION, SOLUTION INTRAVENOUS at 07:56

## 2020-01-01 RX ADMIN — MAGNESIUM SULFATE HEPTAHYDRATE 1 G: 1 INJECTION, SOLUTION INTRAVENOUS at 10:25

## 2020-01-01 RX ADMIN — POTASSIUM CHLORIDE 40 MEQ: 750 TABLET, FILM COATED, EXTENDED RELEASE ORAL at 09:00

## 2020-01-01 RX ADMIN — POTASSIUM & SODIUM PHOSPHATES POWDER PACK 280-160-250 MG 2 PACKET: 280-160-250 PACK at 10:00

## 2020-01-01 RX ADMIN — METOPROLOL TARTRATE 25 MG: 25 TABLET ORAL at 09:27

## 2020-01-01 RX ADMIN — CASTOR OIL AND BALSAM, PERU: 788; 87 OINTMENT TOPICAL at 21:19

## 2020-01-01 RX ADMIN — CASTOR OIL AND BALSAM, PERU: 788; 87 OINTMENT TOPICAL at 17:21

## 2020-01-01 RX ADMIN — VANCOMYCIN HYDROCHLORIDE 500 MG: KIT at 06:14

## 2020-01-01 RX ADMIN — AMIODARONE HYDROCHLORIDE 200 MG: 200 TABLET ORAL at 11:31

## 2020-01-01 RX ADMIN — METRONIDAZOLE 500 MG: 500 INJECTION, SOLUTION INTRAVENOUS at 14:59

## 2020-01-01 RX ADMIN — VANCOMYCIN HYDROCHLORIDE 500 MG: KIT at 17:35

## 2020-01-01 RX ADMIN — IOHEXOL 40 ML: 240 INJECTION, SOLUTION INTRATHECAL; INTRAVASCULAR; INTRAVENOUS; ORAL at 09:37

## 2020-01-01 RX ADMIN — VANCOMYCIN HYDROCHLORIDE 500 MG: KIT at 12:52

## 2020-01-01 RX ADMIN — METRONIDAZOLE 500 MG: 500 INJECTION, SOLUTION INTRAVENOUS at 15:57

## 2020-01-01 RX ADMIN — METRONIDAZOLE 500 MG: 500 INJECTION, SOLUTION INTRAVENOUS at 21:33

## 2020-01-01 RX ADMIN — PRAVASTATIN SODIUM 20 MG: 10 TABLET ORAL at 22:14

## 2020-01-01 RX ADMIN — HEPARIN SODIUM 5000 UNITS: 5000 INJECTION INTRAVENOUS; SUBCUTANEOUS at 08:40

## 2020-01-01 RX ADMIN — HEPARIN SODIUM 5000 UNITS: 5000 INJECTION INTRAVENOUS; SUBCUTANEOUS at 08:39

## 2020-01-01 RX ADMIN — HEPARIN SODIUM 5000 UNITS: 5000 INJECTION INTRAVENOUS; SUBCUTANEOUS at 13:49

## 2020-01-01 RX ADMIN — LORAZEPAM 0.5 MG: 2 INJECTION INTRAMUSCULAR; INTRAVENOUS at 05:00

## 2020-01-01 RX ADMIN — PIPERACILLIN AND TAZOBACTAM 3.38 G: 3; .375 INJECTION, POWDER, LYOPHILIZED, FOR SOLUTION INTRAVENOUS at 02:26

## 2020-01-01 RX ADMIN — ASPIRIN 81 MG CHEWABLE TABLET 81 MG: 81 TABLET CHEWABLE at 09:39

## 2020-01-01 RX ADMIN — SODIUM CHLORIDE 75 ML/HR: 900 INJECTION, SOLUTION INTRAVENOUS at 23:43

## 2020-01-01 RX ADMIN — VANCOMYCIN HYDROCHLORIDE 250 MG: KIT at 07:44

## 2020-01-01 RX ADMIN — AMIODARONE HYDROCHLORIDE 0.5 MG/MIN: 50 INJECTION, SOLUTION INTRAVENOUS at 18:25

## 2020-01-01 RX ADMIN — METOPROLOL TARTRATE 12.5 MG: 25 TABLET ORAL at 11:31

## 2020-01-01 RX ADMIN — DILTIAZEM HYDROCHLORIDE 30 MG: 30 TABLET, FILM COATED ORAL at 23:56

## 2020-01-01 RX ADMIN — METRONIDAZOLE 500 MG: 500 INJECTION, SOLUTION INTRAVENOUS at 06:03

## 2020-01-01 RX ADMIN — AMIODARONE HYDROCHLORIDE 200 MG: 200 TABLET ORAL at 22:14

## 2020-01-01 RX ADMIN — AMIODARONE HYDROCHLORIDE 200 MG: 200 TABLET ORAL at 17:33

## 2020-01-01 RX ADMIN — PIPERACILLIN AND TAZOBACTAM 3.38 G: 3; .375 INJECTION, POWDER, LYOPHILIZED, FOR SOLUTION INTRAVENOUS at 08:50

## 2020-01-01 RX ADMIN — PIPERACILLIN AND TAZOBACTAM 3.38 G: 3; .375 INJECTION, POWDER, LYOPHILIZED, FOR SOLUTION INTRAVENOUS at 17:21

## 2020-01-01 RX ADMIN — CASTOR OIL AND BALSAM, PERU: 788; 87 OINTMENT TOPICAL at 09:23

## 2020-01-01 RX ADMIN — DILTIAZEM HYDROCHLORIDE 60 MG: 30 TABLET, FILM COATED ORAL at 21:54

## 2020-01-01 RX ADMIN — METRONIDAZOLE 500 MG: 500 INJECTION, SOLUTION INTRAVENOUS at 06:17

## 2020-01-01 RX ADMIN — SODIUM CHLORIDE 500 ML: 900 INJECTION, SOLUTION INTRAVENOUS at 01:32

## 2020-01-01 RX ADMIN — SODIUM CHLORIDE 75 ML/HR: 900 INJECTION, SOLUTION INTRAVENOUS at 03:17

## 2020-01-01 RX ADMIN — METOPROLOL TARTRATE 12.5 MG: 25 TABLET ORAL at 09:39

## 2020-01-01 RX ADMIN — METRONIDAZOLE 500 MG: 500 INJECTION, SOLUTION INTRAVENOUS at 21:42

## 2020-01-01 RX ADMIN — METOPROLOL TARTRATE 2.5 MG: 5 INJECTION INTRAVENOUS at 06:24

## 2020-01-01 RX ADMIN — METOPROLOL TARTRATE 2.5 MG: 5 INJECTION INTRAVENOUS at 23:09

## 2020-01-01 RX ADMIN — AMLODIPINE BESYLATE 5 MG: 5 TABLET ORAL at 20:25

## 2020-01-01 RX ADMIN — DEXTROSE MONOHYDRATE 15 MG/HR: 50 INJECTION, SOLUTION INTRAVENOUS at 16:17

## 2020-01-01 RX ADMIN — VANCOMYCIN HYDROCHLORIDE 500 MG: KIT at 00:38

## 2020-01-01 RX ADMIN — SODIUM CHLORIDE 75 ML/HR: 900 INJECTION, SOLUTION INTRAVENOUS at 09:30

## 2020-01-01 RX ADMIN — SODIUM CHLORIDE 50 ML/HR: 900 INJECTION, SOLUTION INTRAVENOUS at 10:50

## 2020-01-01 RX ADMIN — AMIODARONE HYDROCHLORIDE 200 MG: 200 TABLET ORAL at 17:19

## 2020-01-01 RX ADMIN — DEXTROSE MONOHYDRATE 15 MG/HR: 50 INJECTION, SOLUTION INTRAVENOUS at 04:27

## 2020-01-01 RX ADMIN — ASPIRIN 81 MG CHEWABLE TABLET 81 MG: 81 TABLET CHEWABLE at 09:00

## 2020-01-01 RX ADMIN — POTASSIUM CHLORIDE 10 MEQ: 10 INJECTION, SOLUTION INTRAVENOUS at 23:42

## 2020-01-01 RX ADMIN — POTASSIUM CHLORIDE 20 MEQ: 750 TABLET, FILM COATED, EXTENDED RELEASE ORAL at 10:18

## 2020-01-01 RX ADMIN — LEVOFLOXACIN 750 MG: 750 TABLET, FILM COATED ORAL at 14:55

## 2020-01-01 RX ADMIN — HEPARIN SODIUM 5000 UNITS: 5000 INJECTION INTRAVENOUS; SUBCUTANEOUS at 20:45

## 2020-01-01 RX ADMIN — VANCOMYCIN HYDROCHLORIDE 125 MG: KIT at 16:06

## 2020-01-01 RX ADMIN — SODIUM CHLORIDE 1701 ML: 900 INJECTION, SOLUTION INTRAVENOUS at 09:59

## 2020-01-01 RX ADMIN — SODIUM CHLORIDE 75 ML/HR: 900 INJECTION, SOLUTION INTRAVENOUS at 09:36

## 2020-01-01 RX ADMIN — PIPERACILLIN AND TAZOBACTAM 3.38 G: 3; .375 INJECTION, POWDER, LYOPHILIZED, FOR SOLUTION INTRAVENOUS at 11:03

## 2020-01-01 RX ADMIN — AZITHROMYCIN MONOHYDRATE 500 MG: 500 INJECTION, POWDER, LYOPHILIZED, FOR SOLUTION INTRAVENOUS at 21:00

## 2020-01-01 RX ADMIN — PIPERACILLIN AND TAZOBACTAM 3.38 G: 3; .375 INJECTION, POWDER, LYOPHILIZED, FOR SOLUTION INTRAVENOUS at 10:14

## 2020-01-01 RX ADMIN — METOPROLOL TARTRATE 12.5 MG: 25 TABLET ORAL at 17:20

## 2020-01-01 RX ADMIN — NITROGLYCERIN 1 INCH: 20 OINTMENT TOPICAL at 08:39

## 2020-01-01 RX ADMIN — METOPROLOL TARTRATE 2.5 MG: 5 INJECTION INTRAVENOUS at 09:27

## 2020-01-01 RX ADMIN — PIPERACILLIN AND TAZOBACTAM 3.38 G: 3; .375 INJECTION, POWDER, LYOPHILIZED, FOR SOLUTION INTRAVENOUS at 09:27

## 2020-01-01 RX ADMIN — METOPROLOL TARTRATE 12.5 MG: 25 TABLET ORAL at 08:47

## 2020-01-01 RX ADMIN — SODIUM CHLORIDE 500 ML: 900 INJECTION, SOLUTION INTRAVENOUS at 09:48

## 2020-01-01 RX ADMIN — LISINOPRIL 20 MG: 20 TABLET ORAL at 08:40

## 2020-01-01 RX ADMIN — METOPROLOL TARTRATE 2.5 MG: 5 INJECTION INTRAVENOUS at 11:34

## 2020-01-01 RX ADMIN — KETOROLAC TROMETHAMINE 15 MG: 30 INJECTION, SOLUTION INTRAMUSCULAR at 14:09

## 2020-01-01 RX ADMIN — CASTOR OIL AND BALSAM, PERU: 788; 87 OINTMENT TOPICAL at 11:01

## 2020-01-01 RX ADMIN — HEPARIN SODIUM 5000 UNITS: 5000 INJECTION INTRAVENOUS; SUBCUTANEOUS at 14:58

## 2020-01-01 RX ADMIN — HEPARIN SODIUM 5000 UNITS: 5000 INJECTION INTRAVENOUS; SUBCUTANEOUS at 09:27

## 2020-01-01 RX ADMIN — VANCOMYCIN HYDROCHLORIDE 500 MG: KIT at 17:19

## 2020-01-01 RX ADMIN — METOPROLOL TARTRATE 2.5 MG: 5 INJECTION INTRAVENOUS at 23:55

## 2020-01-01 RX ADMIN — VANCOMYCIN HYDROCHLORIDE 125 MG: KIT at 18:30

## 2020-01-01 RX ADMIN — ONDANSETRON 4 MG: 2 INJECTION INTRAMUSCULAR; INTRAVENOUS at 01:32

## 2020-01-01 RX ADMIN — METOPROLOL TARTRATE 12.5 MG: 25 TABLET ORAL at 17:33

## 2020-01-01 RX ADMIN — METRONIDAZOLE 500 MG: 500 INJECTION, SOLUTION INTRAVENOUS at 21:14

## 2020-01-01 RX ADMIN — POLYETHYLENE GLYCOL 3350 17 G: 17 POWDER, FOR SOLUTION ORAL at 19:03

## 2020-01-01 RX ADMIN — SODIUM CHLORIDE 75 ML/HR: 900 INJECTION, SOLUTION INTRAVENOUS at 07:39

## 2020-01-01 RX ADMIN — FUROSEMIDE 20 MG: 10 INJECTION INTRAMUSCULAR; INTRAVENOUS at 14:17

## 2020-01-01 RX ADMIN — VANCOMYCIN HYDROCHLORIDE 500 MG: KIT at 00:48

## 2020-01-01 RX ADMIN — METRONIDAZOLE 500 MG: 500 INJECTION, SOLUTION INTRAVENOUS at 13:10

## 2020-01-01 RX ADMIN — VANCOMYCIN HYDROCHLORIDE 500 MG: KIT at 12:57

## 2020-01-01 RX ADMIN — AMIODARONE HYDROCHLORIDE 200 MG: 200 TABLET ORAL at 15:39

## 2020-01-01 RX ADMIN — HEPARIN SODIUM 5000 UNITS: 5000 INJECTION INTRAVENOUS; SUBCUTANEOUS at 21:56

## 2020-01-01 RX ADMIN — SODIUM CHLORIDE 100 ML/HR: 900 INJECTION, SOLUTION INTRAVENOUS at 19:24

## 2020-01-01 RX ADMIN — VANCOMYCIN HYDROCHLORIDE 250 MG: KIT at 11:34

## 2020-01-01 RX ADMIN — VANCOMYCIN HYDROCHLORIDE 125 MG: KIT at 01:32

## 2020-01-01 RX ADMIN — VANCOMYCIN HYDROCHLORIDE 250 MG: KIT at 23:56

## 2020-01-01 RX ADMIN — SODIUM CHLORIDE 50 ML/HR: 900 INJECTION, SOLUTION INTRAVENOUS at 21:34

## 2020-01-01 RX ADMIN — LEVOFLOXACIN 750 MG: 750 TABLET, FILM COATED ORAL at 13:18

## 2020-01-01 RX ADMIN — METOPROLOL TARTRATE 2.5 MG: 5 INJECTION INTRAVENOUS at 04:06

## 2020-01-01 RX ADMIN — AMIODARONE HYDROCHLORIDE 1 MG/MIN: 50 INJECTION, SOLUTION INTRAVENOUS at 13:35

## 2020-01-01 RX ADMIN — Medication 10 ML: at 05:40

## 2020-01-01 RX ADMIN — DILTIAZEM HYDROCHLORIDE 60 MG: 30 TABLET, FILM COATED ORAL at 17:21

## 2020-01-01 RX ADMIN — PRAVASTATIN SODIUM 20 MG: 10 TABLET ORAL at 22:06

## 2020-01-01 RX ADMIN — VANCOMYCIN HYDROCHLORIDE 500 MG: KIT at 12:31

## 2020-01-01 RX ADMIN — VANCOMYCIN HYDROCHLORIDE 500 MG: KIT at 00:31

## 2020-01-01 RX ADMIN — PIPERACILLIN AND TAZOBACTAM 3.38 G: 3; .375 INJECTION, POWDER, LYOPHILIZED, FOR SOLUTION INTRAVENOUS at 20:34

## 2020-01-01 RX ADMIN — CASTOR OIL AND BALSAM, PERU: 788; 87 OINTMENT TOPICAL at 09:41

## 2020-01-01 RX ADMIN — AZITHROMYCIN MONOHYDRATE 500 MG: 500 INJECTION, POWDER, LYOPHILIZED, FOR SOLUTION INTRAVENOUS at 23:48

## 2020-01-01 RX ADMIN — METOPROLOL TARTRATE 25 MG: 25 TABLET ORAL at 09:10

## 2020-01-01 RX ADMIN — METRONIDAZOLE 500 MG: 500 INJECTION, SOLUTION INTRAVENOUS at 13:59

## 2020-01-01 RX ADMIN — PIPERACILLIN AND TAZOBACTAM 3.38 G: 3; .375 INJECTION, POWDER, LYOPHILIZED, FOR SOLUTION INTRAVENOUS at 02:34

## 2020-01-01 RX ADMIN — PIPERACILLIN AND TAZOBACTAM 3.38 G: 3; .375 INJECTION, POWDER, LYOPHILIZED, FOR SOLUTION INTRAVENOUS at 09:10

## 2020-01-01 RX ADMIN — PIPERACILLIN AND TAZOBACTAM 3.38 G: 3; .375 INJECTION, POWDER, LYOPHILIZED, FOR SOLUTION INTRAVENOUS at 17:07

## 2020-01-01 RX ADMIN — DEXTROSE MONOHYDRATE 10 MG/HR: 50 INJECTION, SOLUTION INTRAVENOUS at 11:54

## 2020-01-01 RX ADMIN — SODIUM CHLORIDE 75 ML/HR: 900 INJECTION, SOLUTION INTRAVENOUS at 12:52

## 2020-01-01 RX ADMIN — VANCOMYCIN HYDROCHLORIDE 500 MG: KIT at 05:55

## 2020-01-01 RX ADMIN — CASTOR OIL AND BALSAM, PERU: 788; 87 OINTMENT TOPICAL at 05:49

## 2020-01-01 RX ADMIN — Medication 5 ML: at 05:49

## 2020-01-01 RX ADMIN — Medication 10 ML: at 22:37

## 2020-01-01 RX ADMIN — AMLODIPINE BESYLATE 5 MG: 5 TABLET ORAL at 17:08

## 2020-01-01 RX ADMIN — DEXTROSE MONOHYDRATE 15 MG/HR: 50 INJECTION, SOLUTION INTRAVENOUS at 10:28

## 2020-01-01 RX ADMIN — VANCOMYCIN HYDROCHLORIDE 500 MG: KIT at 17:30

## 2020-01-01 RX ADMIN — METOPROLOL TARTRATE 5 MG: 5 INJECTION INTRAVENOUS at 17:12

## 2020-01-01 RX ADMIN — AMIODARONE HYDROCHLORIDE 0.5 MG/MIN: 50 INJECTION, SOLUTION INTRAVENOUS at 18:42

## 2020-01-01 RX ADMIN — DILTIAZEM HYDROCHLORIDE 30 MG: 30 TABLET, FILM COATED ORAL at 08:09

## 2020-01-01 RX ADMIN — METOPROLOL TARTRATE 2.5 MG: 5 INJECTION INTRAVENOUS at 15:58

## 2020-01-01 RX ADMIN — FUROSEMIDE 20 MG: 10 INJECTION, SOLUTION INTRAMUSCULAR; INTRAVENOUS at 14:17

## 2020-01-01 RX ADMIN — POTASSIUM CHLORIDE 40 MEQ: 750 TABLET, FILM COATED, EXTENDED RELEASE ORAL at 14:11

## 2020-01-01 RX ADMIN — Medication 10 ML: at 14:12

## 2020-01-01 RX ADMIN — SODIUM CHLORIDE 75 ML/HR: 900 INJECTION, SOLUTION INTRAVENOUS at 14:12

## 2020-01-01 RX ADMIN — DILTIAZEM HYDROCHLORIDE 30 MG: 30 TABLET, FILM COATED ORAL at 19:03

## 2020-01-01 RX ADMIN — AMIODARONE HYDROCHLORIDE 200 MG: 200 TABLET ORAL at 21:34

## 2020-01-01 RX ADMIN — POTASSIUM CHLORIDE 10 MEQ: 7.46 INJECTION, SOLUTION INTRAVENOUS at 11:59

## 2020-01-09 PROBLEM — R26.2 INABILITY TO WALK: Status: ACTIVE | Noted: 2020-01-01

## 2020-01-09 PROBLEM — J18.9 PNA (PNEUMONIA): Status: ACTIVE | Noted: 2020-01-01

## 2020-01-09 PROBLEM — I48.91 A-FIB (HCC): Status: ACTIVE | Noted: 2020-01-01

## 2020-01-09 PROBLEM — I16.0 HYPERTENSIVE URGENCY: Status: ACTIVE | Noted: 2020-01-01

## 2020-01-09 NOTE — ED PROVIDER NOTES
EMERGENCY DEPARTMENT HISTORY AND PHYSICAL EXAM      Date: 1/9/2020  Patient Name: Bunny Shearer    History of Presenting Illness     No chief complaint on file. History Provided By: Patient    HPI: Bunny Shearer, 80 y.o. female presents to the ED with cc of generalized weakness. Associated symptoms include malaise, difficulty with ambulation, and anorexia. Patient's daughter states that she has been doing poorly since last winter when she had several urinary tract infections. There has been no fall injury or obvious trauma. Patient lives at home with her son and her daughter assist with care. Pt denies fevers, chills, night sweats, chest pain, pressure, SOB, [place], PND, orthopnea, abdominal pain, n/v/d, melena, hematuria, dysuria, constipation, HA, dizziness, and syncope      There are no other complaints, changes, or physical findings at this time. PCP: Kike Ruano NP    No current facility-administered medications on file prior to encounter. Current Outpatient Medications on File Prior to Encounter   Medication Sig Dispense Refill    lisinopril (PRINIVIL, ZESTRIL) 20 mg tablet TAKE 1 TABLET BY MOUTH ONCE DAILY 90 Tab 3    simvastatin (ZOCOR) 20 mg tablet TAKE 1 TABLET BY MOUTH ONCE DAILY IN THE EVENING 90 Tab 3    amLODIPine (NORVASC) 10 mg tablet TAKE 1 TABLET BY MOUTH ONCE DAILY 90 Tab 3    hydroCHLOROthiazide (HYDRODIURIL) 12.5 mg tablet TAKE 1 TABLET BY MOUTH ONCE DAILY 90 Tab 3    [DISCONTINUED] polyethylene glycol (MIRALAX) 17 gram/dose powder DILUTE ONE CAPFULL IN WATER AS DIRECTED DAILY 765 g 1    [DISCONTINUED] acetaminophen (TYLENOL ARTHRITIS) 650 mg CR tablet Take 1,300 mg by mouth every twelve (12) hours as needed.  [DISCONTINUED] GLUC HCL/CSA/NGOZI HY/HYALUR AC (CRDFOXJU-PACB-DVAQQW-HYALUR AC PO) Take  by mouth.  [DISCONTINUED] calcium-cholecalciferol, D3, (CALCARB 600 WITH VITAMIN D) tablet Take 1 Tab by mouth daily.       [DISCONTINUED] vit a,c & e-lutein-minerals (OCUVITE) tablet daily.  [DISCONTINUED] aspirin delayed-release 81 mg tablet Take  by mouth daily.  [DISCONTINUED] omega-3 fatty acids-vitamin e (FISH OIL) 1,000 mg Cap Take 1 Cap by mouth. Past History     Past Medical History:  Past Medical History:   Diagnosis Date    Disc disease, degenerative, lumbar or lumbosacral     HTN (hypertension) 2010    Hypercholesteremia 2010    Osteoporosis, age related        Past Surgical History:  Past Surgical History:   Procedure Laterality Date    ABDOMEN SURGERY PROC UNLISTED      ENDOSCOPY, COLON, DIAGNOSTIC      HX CATARACT REMOVAL      HX GYN         Family History:  Family History   Problem Relation Age of Onset    Heart Disease Mother     Cancer Father         colon    No Known Problems Sister     No Known Problems Brother        Social History:  Social History     Tobacco Use    Smoking status: Former Smoker     Last attempt to quit: 5/3/1986     Years since quittin.7    Smokeless tobacco: Never Used   Substance Use Topics    Alcohol use: Yes     Comment: wine occas    Drug use: No       Allergies:  No Known Allergies      Review of Systems   Review of Systems   Constitutional: Positive for appetite change. Negative for activity change, chills, diaphoresis, fatigue, fever and unexpected weight change. HENT: Negative for congestion, ear pain, rhinorrhea, sinus pressure, sore throat and tinnitus. Eyes: Negative for photophobia, pain, discharge and visual disturbance. Respiratory: Negative for apnea, cough, choking, chest tightness, shortness of breath, wheezing and stridor. Cardiovascular: Negative for chest pain, palpitations and leg swelling. Gastrointestinal: Negative for abdominal pain, constipation, diarrhea, nausea and vomiting. Endocrine: Negative for polydipsia, polyphagia and polyuria.    Genitourinary: Negative for decreased urine volume, dyspareunia, dysuria, enuresis, flank pain, frequency, hematuria and urgency. Musculoskeletal: Positive for gait problem. Negative for arthralgias, back pain, myalgias and neck pain. Skin: Negative for color change, pallor and rash. Allergic/Immunologic: Negative for immunocompromised state. Neurological: Positive for weakness. Negative for dizziness, seizures, syncope, light-headedness and headaches. Hematological: Does not bruise/bleed easily. Psychiatric/Behavioral: Negative for agitation and confusion. The patient is not nervous/anxious. All other systems reviewed and are negative. Physical Exam   Physical Exam  Vitals signs and nursing note reviewed. Constitutional:       General: She is not in acute distress. Appearance: She is well-developed. She is not diaphoretic. HENT:      Head: Normocephalic. Right Ear: External ear normal.      Left Ear: External ear normal.      Mouth/Throat:      Pharynx: No oropharyngeal exudate. Eyes:      General: No scleral icterus. Right eye: No discharge. Left eye: No discharge. Conjunctiva/sclera: Conjunctivae normal.      Pupils: Pupils are equal, round, and reactive to light. Neck:      Musculoskeletal: Normal range of motion and neck supple. Thyroid: No thyromegaly. Vascular: No JVD. Trachea: No tracheal deviation. Cardiovascular:      Rate and Rhythm: Normal rate and regular rhythm. Heart sounds: Normal heart sounds. No murmur. No friction rub. No gallop. Pulmonary:      Effort: Pulmonary effort is normal. No respiratory distress. Breath sounds: Normal breath sounds. No stridor. No wheezing or rales. Chest:      Chest wall: No tenderness. Abdominal:      General: Bowel sounds are normal. There is no distension. Palpations: Abdomen is soft. There is no mass. Tenderness: There is no tenderness. There is no guarding or rebound. Musculoskeletal: Normal range of motion. General: No tenderness.    Lymphadenopathy: Cervical: No cervical adenopathy. Skin:     General: Skin is warm and dry. Coloration: Skin is not pale. Findings: No erythema or rash. Neurological:      Mental Status: She is alert. Cranial Nerves: No cranial nerve deficit. Coordination: Coordination normal.      Deep Tendon Reflexes: Reflexes normal.   Psychiatric:         Behavior: Behavior normal.         Thought Content: Thought content normal.         Judgment: Judgment normal.         Diagnostic Study Results     Labs -     Recent Results (from the past 12 hour(s))   EKG, 12 LEAD, INITIAL    Collection Time: 01/09/20 12:33 PM   Result Value Ref Range    Ventricular Rate 87 BPM    Atrial Rate 87 BPM    P-R Interval 160 ms    QRS Duration 72 ms    Q-T Interval 342 ms    QTC Calculation (Bezet) 411 ms    Calculated P Axis 54 degrees    Calculated R Axis 3 degrees    Calculated T Axis 35 degrees    Diagnosis       Sinus rhythm with premature supraventricular complexes  No previous ECGs available     CBC WITH AUTOMATED DIFF    Collection Time: 01/09/20 12:53 PM   Result Value Ref Range    WBC 16.5 (H) 3.6 - 11.0 K/uL    RBC 4.52 3.80 - 5.20 M/uL    HGB 12.9 11.5 - 16.0 g/dL    HCT 40.1 35.0 - 47.0 %    MCV 88.7 80.0 - 99.0 FL    MCH 28.5 26.0 - 34.0 PG    MCHC 32.2 30.0 - 36.5 g/dL    RDW 15.1 (H) 11.5 - 14.5 %    PLATELET 644 627 - 763 K/uL    MPV 10.7 8.9 - 12.9 FL    NRBC 0.0 0  WBC    ABSOLUTE NRBC 0.00 0.00 - 0.01 K/uL    NEUTROPHILS 67 32 - 75 %    LYMPHOCYTES 24 12 - 49 %    MONOCYTES 8 5 - 13 %    EOSINOPHILS 0 0 - 7 %    BASOPHILS 0 0 - 1 %    IMMATURE GRANULOCYTES 1 (H) 0.0 - 0.5 %    ABS. NEUTROPHILS 11.1 (H) 1.8 - 8.0 K/UL    ABS. LYMPHOCYTES 3.9 (H) 0.8 - 3.5 K/UL    ABS. MONOCYTES 1.4 (H) 0.0 - 1.0 K/UL    ABS. EOSINOPHILS 0.0 0.0 - 0.4 K/UL    ABS. BASOPHILS 0.0 0.0 - 0.1 K/UL    ABS. IMM.  GRANS. 0.1 (H) 0.00 - 0.04 K/UL    DF AUTOMATED     URINALYSIS W/MICROSCOPIC    Collection Time: 01/09/20  2:00 PM   Result Value Ref Range    Color YELLOW/STRAW      Appearance CLOUDY (A) CLEAR      Specific gravity 1.020 1.003 - 1.030      pH (UA) 7.0 5.0 - 8.0      Protein 30 (A) NEG mg/dL    Glucose NEGATIVE  NEG mg/dL    Ketone NEGATIVE  NEG mg/dL    Bilirubin NEGATIVE  NEG      Blood SMALL (A) NEG      Urobilinogen 1.0 0.2 - 1.0 EU/dL    Nitrites NEGATIVE  NEG      Leukocyte Esterase TRACE (A) NEG      WBC 10-20 0 - 4 /hpf    RBC 10-20 0 - 5 /hpf    Epithelial cells FEW FEW /lpf    Bacteria 1+ (A) NEG /hpf    Hyaline cast 0-2 0 - 5 /lpf   METABOLIC PANEL, COMPREHENSIVE    Collection Time: 01/09/20  2:00 PM   Result Value Ref Range    Sodium 135 (L) 136 - 145 mmol/L    Potassium 3.4 (L) 3.5 - 5.1 mmol/L    Chloride 101 97 - 108 mmol/L    CO2 27 21 - 32 mmol/L    Anion gap 7 5 - 15 mmol/L    Glucose 129 (H) 65 - 100 mg/dL    BUN 14 6 - 20 MG/DL    Creatinine 0.66 0.55 - 1.02 MG/DL    BUN/Creatinine ratio 21 (H) 12 - 20      GFR est AA >60 >60 ml/min/1.73m2    GFR est non-AA >60 >60 ml/min/1.73m2    Calcium 8.5 8.5 - 10.1 MG/DL    Bilirubin, total 1.1 (H) 0.2 - 1.0 MG/DL    ALT (SGPT) 16 12 - 78 U/L    AST (SGOT) 19 15 - 37 U/L    Alk. phosphatase 81 45 - 117 U/L    Protein, total 7.4 6.4 - 8.2 g/dL    Albumin 3.0 (L) 3.5 - 5.0 g/dL    Globulin 4.4 (H) 2.0 - 4.0 g/dL    A-G Ratio 0.7 (L) 1.1 - 2.2         Radiologic Studies -   CT HEAD WO CONT   Final Result   IMPRESSION: No acute intracranial hemorrhage or infarct. CT ABD PELV WO CONT   Final Result   IMPRESSION:   Fecal impaction. Bladder distention. Mild prominence of the collecting system on the right. XR CHEST PORT   Final Result   IMPRESSION:   Mild interstitial prominence, bibasilar atelectasis or scar left greater than   right with left perihilar atelectasis or developing infiltrate. Correlate for   developing mild interstitial edema versus inflammatory infiltrate. .  .            CT Results  (Last 48 hours)               01/09/20 1605  CT HEAD WO CONT Final result    Impression:  IMPRESSION: No acute intracranial hemorrhage or infarct. Narrative:  INDICATION: Altered mental status. Exam: Noncontrast CT of the brain is performed with 5 mm collimation. CT dose reduction was achieved to the use of a standardized protocol tailored   for this examination and automatic exposure control for dose modulation. FINDINGS: There is no acute intracranial hemorrhage, mass, mass effect or   herniation. There is age-appropriate diffuse cortical atrophy with ex vacuo   dilatation of the ventricular system. There are periventricular hypodensities   consistent with chronic microvascular ischemic changes. There is no evidence of   acute territorial infarct. The mastoid air cells are well pneumatized. The   visualized paranasal sinuses are normal.           01/09/20 1605  CT ABD PELV WO CONT Final result    Impression:  IMPRESSION:   Fecal impaction. Bladder distention. Mild prominence of the collecting system on the right. Narrative:  EXAM: CT ABD PELV WO CONT       INDICATION: recurrent uti and weakness       COMPARISON: None       CONTRAST:  None. TECHNIQUE:    Thin axial images were obtained through the abdomen and pelvis. Coronal and   sagittal reconstructions were generated. Oral contrast was not administered. CT   dose reduction was achieved through use of a standardized protocol tailored for   this examination and automatic exposure control for dose modulation. The absence of intravenous contrast material reduces the sensitivity for   evaluation of the solid parenchymal organs of the abdomen. FINDINGS:    LUNG BASES: Bibasilar atelectasis or scarring. INCIDENTALLY IMAGED HEART AND MEDIASTINUM: Unremarkable. LIVER: No mass or biliary dilatation. GALLBLADDER: Distended   SPLEEN: No mass. PANCREAS: No mass or ductal dilatation. ADRENALS: Unremarkable. KIDNEYS/URETERS: No mass, calculus, or hydronephrosis.  Slight prominence of the   collecting system and renal pelvis on the right without definite calculus or   mass. STOMACH: Unremarkable. SMALL BOWEL: No dilatation or wall thickening. COLON: No dilatation or wall thickening. Prominent distention of the rectum with   fecal stasis. Sigmoid diverticulosis of. APPENDIX: Unremarkable. PERITONEUM: No ascites or pneumoperitoneum. RETROPERITONEUM: No lymphadenopathy or aortic aneurysm. URINARY BLADDER: Mild bladder distention. BONES: No destructive bone lesion. ADDITIONAL COMMENTS: N/A               CXR Results  (Last 48 hours)               01/09/20 1233  XR CHEST PORT Final result    Impression:  IMPRESSION:   Mild interstitial prominence, bibasilar atelectasis or scar left greater than   right with left perihilar atelectasis or developing infiltrate. Correlate for   developing mild interstitial edema versus inflammatory infiltrate. .  . Narrative:  INDICATION:  weakness        EXAM: Chest single view. COMPARISON: None. Huber Watkins FINDINGS: A single frontal view of the chest at 1203 hours shows diffuse mild   interstitial prominence with by basilar atelectasis or scar left greater than   right. Left perihilar opacity may represent focal atelectasis or developing   infiltrate. .  The heart, mediastinum and pulmonary vasculature are otherwise   normal .  The bony thorax is unremarkable for age, except for thoracolumbar   dextrocurvature. .                 Medical Decision Making   I am the first provider for this patient. I reviewed the vital signs, available nursing notes, past medical history, past surgical history, family history and social history. Vital Signs-Reviewed the patient's vital signs.   Patient Vitals for the past 12 hrs:   Temp Pulse Resp BP SpO2   01/09/20 1728  88 25 198/87    01/09/20 1700  (!) 124 23 (!) 197/98    01/09/20 1500  96 24 (!) 203/78 96 %   01/09/20 1300  91 24 (!) 199/92 95 %   01/09/20 1245  85 25 169/79 95 %   01/09/20 1144 97.9 °F (36.6 °C) 93 25 189/81 95 %     Records Reviewed: Nursing Notes, Old Medical Records, Previous electrocardiograms, Previous Radiology Studies and Previous Laboratory Studies    Provider Notes (Medical Decision Making):   Generalized weakness and failure to thrive    Routine laboratory data, chest x-ray, CT head, urine studies    CONSULT NOTE:   5:55 PM  Dafne Thomas NP spoke with Dr. Martina Carvalho MD,   Specialty: Hospitalist  Discussed pt's hx, disposition, and available diagnostic and imaging results. Reviewed care plans. Consultant agrees with plans as outlined. Admit to inpatient. Dafne Thomas NP      ED Course:   Initial assessment performed. The patients presenting problems have been discussed, and they are in agreement with the care plan formulated and outlined with them. I have encouraged them to ask questions as they arise throughout their visit. Hemodynamically stable patient in no acute distress    Critical Care Time:   0    Disposition:  Admit to inpatient     PLAN:  5:56 PM  Patient is being admitted to the hospital.  The results of their tests and reasons for their admission have been discussed with them and/or available family. They convey agreement and understanding for the need to be admitted and for their admission diagnosis. Consultation has been made with the inpatient physician specialist for hospitalization.     LABORATORY TESTS:  Recent Results (from the past 12 hour(s))   EKG, 12 LEAD, INITIAL    Collection Time: 01/09/20 12:33 PM   Result Value Ref Range    Ventricular Rate 87 BPM    Atrial Rate 87 BPM    P-R Interval 160 ms    QRS Duration 72 ms    Q-T Interval 342 ms    QTC Calculation (Bezet) 411 ms    Calculated P Axis 54 degrees    Calculated R Axis 3 degrees    Calculated T Axis 35 degrees    Diagnosis       Sinus rhythm with premature supraventricular complexes  No previous ECGs available     CBC WITH AUTOMATED DIFF    Collection Time: 01/09/20 12:53 PM   Result Value Ref Range    WBC 16.5 (H) 3.6 - 11.0 K/uL    RBC 4.52 3.80 - 5.20 M/uL    HGB 12.9 11.5 - 16.0 g/dL    HCT 40.1 35.0 - 47.0 %    MCV 88.7 80.0 - 99.0 FL    MCH 28.5 26.0 - 34.0 PG    MCHC 32.2 30.0 - 36.5 g/dL    RDW 15.1 (H) 11.5 - 14.5 %    PLATELET 804 974 - 175 K/uL    MPV 10.7 8.9 - 12.9 FL    NRBC 0.0 0  WBC    ABSOLUTE NRBC 0.00 0.00 - 0.01 K/uL    NEUTROPHILS 67 32 - 75 %    LYMPHOCYTES 24 12 - 49 %    MONOCYTES 8 5 - 13 %    EOSINOPHILS 0 0 - 7 %    BASOPHILS 0 0 - 1 %    IMMATURE GRANULOCYTES 1 (H) 0.0 - 0.5 %    ABS. NEUTROPHILS 11.1 (H) 1.8 - 8.0 K/UL    ABS. LYMPHOCYTES 3.9 (H) 0.8 - 3.5 K/UL    ABS. MONOCYTES 1.4 (H) 0.0 - 1.0 K/UL    ABS. EOSINOPHILS 0.0 0.0 - 0.4 K/UL    ABS. BASOPHILS 0.0 0.0 - 0.1 K/UL    ABS. IMM.  GRANS. 0.1 (H) 0.00 - 0.04 K/UL    DF AUTOMATED     URINALYSIS W/MICROSCOPIC    Collection Time: 01/09/20  2:00 PM   Result Value Ref Range    Color YELLOW/STRAW      Appearance CLOUDY (A) CLEAR      Specific gravity 1.020 1.003 - 1.030      pH (UA) 7.0 5.0 - 8.0      Protein 30 (A) NEG mg/dL    Glucose NEGATIVE  NEG mg/dL    Ketone NEGATIVE  NEG mg/dL    Bilirubin NEGATIVE  NEG      Blood SMALL (A) NEG      Urobilinogen 1.0 0.2 - 1.0 EU/dL    Nitrites NEGATIVE  NEG      Leukocyte Esterase TRACE (A) NEG      WBC 10-20 0 - 4 /hpf    RBC 10-20 0 - 5 /hpf    Epithelial cells FEW FEW /lpf    Bacteria 1+ (A) NEG /hpf    Hyaline cast 0-2 0 - 5 /lpf   METABOLIC PANEL, COMPREHENSIVE    Collection Time: 01/09/20  2:00 PM   Result Value Ref Range    Sodium 135 (L) 136 - 145 mmol/L    Potassium 3.4 (L) 3.5 - 5.1 mmol/L    Chloride 101 97 - 108 mmol/L    CO2 27 21 - 32 mmol/L    Anion gap 7 5 - 15 mmol/L    Glucose 129 (H) 65 - 100 mg/dL    BUN 14 6 - 20 MG/DL    Creatinine 0.66 0.55 - 1.02 MG/DL    BUN/Creatinine ratio 21 (H) 12 - 20      GFR est AA >60 >60 ml/min/1.73m2    GFR est non-AA >60 >60 ml/min/1.73m2    Calcium 8.5 8.5 - 10.1 MG/DL    Bilirubin, total 1.1 (H) 0.2 - 1.0 MG/DL    ALT (SGPT) 16 12 - 78 U/L    AST (SGOT) 19 15 - 37 U/L    Alk. phosphatase 81 45 - 117 U/L    Protein, total 7.4 6.4 - 8.2 g/dL    Albumin 3.0 (L) 3.5 - 5.0 g/dL    Globulin 4.4 (H) 2.0 - 4.0 g/dL    A-G Ratio 0.7 (L) 1.1 - 2.2         IMAGING RESULTS:  CT HEAD WO CONT   Final Result   IMPRESSION: No acute intracranial hemorrhage or infarct. CT ABD PELV WO CONT   Final Result   IMPRESSION:   Fecal impaction. Bladder distention. Mild prominence of the collecting system on the right. XR CHEST PORT   Final Result   IMPRESSION:   Mild interstitial prominence, bibasilar atelectasis or scar left greater than   right with left perihilar atelectasis or developing infiltrate. Correlate for   developing mild interstitial edema versus inflammatory infiltrate. .  . Ct Head Wo Cont    Result Date: 1/9/2020  INDICATION: Altered mental status. Exam: Noncontrast CT of the brain is performed with 5 mm collimation. CT dose reduction was achieved to the use of a standardized protocol tailored for this examination and automatic exposure control for dose modulation. FINDINGS: There is no acute intracranial hemorrhage, mass, mass effect or herniation. There is age-appropriate diffuse cortical atrophy with ex vacuo dilatation of the ventricular system. There are periventricular hypodensities consistent with chronic microvascular ischemic changes. There is no evidence of acute territorial infarct. The mastoid air cells are well pneumatized. The visualized paranasal sinuses are normal.     IMPRESSION: No acute intracranial hemorrhage or infarct. Ct Abd Pelv Wo Cont    Result Date: 1/9/2020  EXAM: CT ABD PELV WO CONT INDICATION: recurrent uti and weakness COMPARISON: None CONTRAST:  None. TECHNIQUE: Thin axial images were obtained through the abdomen and pelvis. Coronal and sagittal reconstructions were generated. Oral contrast was not administered.  CT dose reduction was achieved through use of a standardized protocol tailored for this examination and automatic exposure control for dose modulation. The absence of intravenous contrast material reduces the sensitivity for evaluation of the solid parenchymal organs of the abdomen. FINDINGS: LUNG BASES: Bibasilar atelectasis or scarring. INCIDENTALLY IMAGED HEART AND MEDIASTINUM: Unremarkable. LIVER: No mass or biliary dilatation. GALLBLADDER: Distended SPLEEN: No mass. PANCREAS: No mass or ductal dilatation. ADRENALS: Unremarkable. KIDNEYS/URETERS: No mass, calculus, or hydronephrosis. Slight prominence of the collecting system and renal pelvis on the right without definite calculus or mass. STOMACH: Unremarkable. SMALL BOWEL: No dilatation or wall thickening. COLON: No dilatation or wall thickening. Prominent distention of the rectum with fecal stasis. Sigmoid diverticulosis of. APPENDIX: Unremarkable. PERITONEUM: No ascites or pneumoperitoneum. RETROPERITONEUM: No lymphadenopathy or aortic aneurysm. URINARY BLADDER: Mild bladder distention. BONES: No destructive bone lesion. ADDITIONAL COMMENTS: N/A     IMPRESSION: Fecal impaction. Bladder distention. Mild prominence of the collecting system on the right. Xr Chest Port    Result Date: 1/9/2020  INDICATION:  weakness EXAM: Chest single view. COMPARISON: None. Eda Russo FINDINGS: A single frontal view of the chest at 1203 hours shows diffuse mild interstitial prominence with by basilar atelectasis or scar left greater than right. Left perihilar opacity may represent focal atelectasis or developing infiltrate. .  The heart, mediastinum and pulmonary vasculature are otherwise normal .  The bony thorax is unremarkable for age, except for thoracolumbar dextrocurvature. Eda Russo IMPRESSION: Mild interstitial prominence, bibasilar atelectasis or scar left greater than right with left perihilar atelectasis or developing infiltrate.  Correlate for developing mild interstitial edema versus inflammatory infiltrate. .  . MEDICATIONS GIVEN:  Medications   metoprolol (LOPRESSOR) injection 5 mg (5 mg IntraVENous Incomplete 1/9/20 1712)   cefTRIAXone (ROCEPHIN) 1 g in 0.9% sodium chloride (MBP/ADV) 50 mL (0 g IntraVENous Stopped 1/9/20 1600)       IMPRESSION:  1. New onset a-fib ([address]    2. Acute cystitis with hematuria    3. Weakness    4. Altered sensorium        PLAN:  1. Admit to Hospitalist     Diagnosis     Clinical Impression:   1. New onset a-fib ([address]    2. Acute cystitis with hematuria    3. Weakness    4. Altered sensorium        Attestations:    Ba Knox NP    Please note that this dictation was completed with [employer], the [employer] voice recognition software. Quite often unanticipated grammatical, syntax, homophones, and other interpretive errors are inadvertently transcribed by the computer software. Please disregard these errors. Please excuse any errors that have escaped final proofreading. Thank you.

## 2020-01-09 NOTE — ED TRIAGE NOTES
Received patient from ems, patient arrived awake and alert. Patient does complain of low back pain, and malika leg pain with movement. Family arrived and reports, patient has been having more difficulty with ambulation over the last few months. Family also reports overall decline in patients ability to care for self.

## 2020-01-09 NOTE — PROGRESS NOTES
Pharmacy Clarification of Prior to Admission Medication Regimen-Follow Up Needed    The patient was unable to participate in interview regarding clarification of the prior to admission medication regimen. Pharmacy attempted to clarify the prior to admission medication regimen for the patient, but was unsuccessful after multiple attempts. The following resources were used to facilitate this attempt:   The patient had medication bottles in the room. The PTA med list was made according to the 4 prescription bottles that were in the patient's room provided by the patient's daughter.  The patient's daughter confirmed that she takes no other medications and has stopped taking all over the counter medications that the patient was previously taken.  The patient's daughter was unable to confirm last doses because she does not live with her mother.  The patient stated she does not know the difference between today, tomorrow, and yesterday and was unable to confirm the last time she took medications. Prior to Admission Medications   Prescriptions Last Dose Informant Taking? amLODIPine (NORVASC) 10 mg tablet Unknown at Unknown time Other No   Sig: TAKE 1 TABLET BY MOUTH ONCE DAILY   hydroCHLOROthiazide (HYDRODIURIL) 12.5 mg tablet Unknown at Unknown time Other No   Sig: TAKE 1 TABLET BY MOUTH ONCE DAILY   lisinopril (PRINIVIL, ZESTRIL) 20 mg tablet Unknown at Unknown time Other No   Sig: TAKE 1 TABLET BY MOUTH ONCE DAILY   simvastatin (ZOCOR) 20 mg tablet Unknown at Unknown time Other No   Sig: TAKE 1 TABLET BY MOUTH ONCE DAILY IN THE EVENING      Facility-Administered Medications: None         The medication history will need to be re-evaluated at a later time during admission when patient is willing/able to participate or if more information is provided. Thank you,  Cintia Husain D.  Candidate Class of 2021

## 2020-01-10 NOTE — ED NOTES
TRANSFER - OUT REPORT:    Verbal report given to Edu Huang RN (name) on Ok Scales  being transferred to Ortho (unit) for routine progression of care       Report consisted of patients Situation, Background, Assessment and   Recommendations(SBAR). Information from the following report(s) SBAR, Kardex, ED Summary, Intake/Output and MAR was reviewed with the receiving nurse. Lines:   Peripheral IV 01/09/20 Left Wrist (Active)   Site Assessment Clean, dry, & intact 1/9/2020 12:59 PM   Phlebitis Assessment 0 1/9/2020 12:59 PM   Infiltration Assessment 0 1/9/2020 12:59 PM   Dressing Status Clean, dry, & intact 1/9/2020 12:59 PM   Dressing Type Transparent 1/9/2020 12:59 PM   Hub Color/Line Status Flushed;Capped 1/9/2020 12:59 PM        Opportunity for questions and clarification was provided.       Patient transported with:   Monitor

## 2020-01-10 NOTE — PROGRESS NOTES
Problem: Falls - Risk of  Goal: *Absence of Falls  Description  Document Kristyapollofunmilayo  Fall Risk and appropriate interventions in the flowsheet.   Outcome: Progressing Towards Goal  Note: Fall Risk Interventions:       Mentation Interventions: Bed/chair exit alarm                        Problem: Patient Education: Go to Patient Education Activity  Goal: Patient/Family Education  Outcome: Progressing Towards Goal     Problem: Patient Education: Go to Patient Education Activity  Goal: Patient/Family Education  Outcome: Progressing Towards Goal

## 2020-01-10 NOTE — PROGRESS NOTES
Problem: Mobility Impaired (Adult and Pediatric)  Goal: *Acute Goals and Plan of Care (Insert Text)  Description  FUNCTIONAL STATUS PRIOR TO ADMISSION: Patient was independent without use of DME. Patient used a cane on occasion but refused to use a rollator    HOME SUPPORT PRIOR TO ADMISSION: The patient lived with her son who works night shift and has a daughter \"close by\". Physical Therapy Goals  Initiated 1/10/2020  1. Patient will move from supine to sit and sit to supine  in bed with moderate assistance  within 7 day(s). 2.  Patient will transfer from bed to chair and chair to bed with moderate assistance  using the least restrictive device within 7 day(s). 3.  Patient will perform sit to stand with moderate assistance  within 7 day(s). 4.  Patient will ambulate with moderate assistance  for 50 feet with the least restrictive device within 7 day(s). 5.  Patient will sit unsupported on edge of bed x10 minutes with supervision within 7 days. Outcome: Progressing Towards Goal   PHYSICAL THERAPY EVALUATION  Patient: Cole Bess (89 y.o. female)  Date: 1/10/2020  Primary Diagnosis: PNA (pneumonia) [J18.9]        Precautions:  Fall      ASSESSMENT  Based on the objective data described below, the patient presents with confusion, decreased command following, impaired balance, decreased strength, fear of falling, and an inability to walk following admission for PNA. Received for evaluation by her hypervigilant but very supportive daughters who assisted with background. The patient was awake and conversant but confused during evaluation. Family reports this is drastically different from earlier in the day. The patient required additional time for ~50% command following but was frequently distracted by family interjecting for support. Attempted to stand x2 requiring maximum assist x2 d/t patient fear of falling with retropulsion and flexed posture.    She had a recent fall without injury approximately 1 week ago that her daughters discovered today. They report a progressive decline starting in the fall and leading to admission today. Current Level of Function Impacting Discharge (mobility/balance): maximum assist x2 for all mobility         Patient will benefit from skilled therapy intervention to address the above noted impairments. PLAN :  Recommendations and Planned Interventions: bed mobility training, transfer training, gait training, and therapeutic exercises      Frequency/Duration: Patient will be followed by physical therapy:  5 times a week to address goals. Recommendation for discharge: (in order for the patient to meet his/her long term goals)  Therapy up to 5 days/week in SNF setting    IF patient discharges home will need the following DME: to be determined (TBD)         SUBJECTIVE:   Patient stated This is a wonderful month and it should be remembered.  (when asked date)    OBJECTIVE DATA SUMMARY:   HISTORY:    Past Medical History:   Diagnosis Date    Disc disease, degenerative, lumbar or lumbosacral     HTN (hypertension) 4/9/2010    Hypercholesteremia 4/9/2010    Osteoporosis, age related      Past Surgical History:   Procedure Laterality Date    ABDOMEN SURGERY PROC UNLISTED      ENDOSCOPY, COLON, DIAGNOSTIC      HX CATARACT REMOVAL      HX GYN         Personal factors and/or comorbidities impacting plan of care:     Home Situation  Home Environment: Private residence  # Steps to Enter: 3  Rails to Enter: Yes  One/Two Story Residence: One story  Living Alone: No  Support Systems: Child(rosie)  Patient Expects to be Discharged to[de-identified] Rehabilitation facility  Current DME Used/Available at Home: [place], straight, Walker, rollator    EXAMINATION/PRESENTATION/DECISION MAKING:   Critical Behavior:  Neurologic State: Alert  Orientation Level: Oriented to person, Oriented to place, Disoriented to situation, Disoriented to time(\"hospital\" but could not name)  Cognition: Decreased command following, Decreased attention/concentration     Hearing: Auditory  Auditory Impairment: Hard of hearing, bilateral  Skin:    Edema:   Range Of Motion:  AROM: Generally decreased, functional                       Strength:    Strength: Generally decreased, functional                    Tone & Sensation:   Tone: Normal                              Coordination:  Coordination: Generally decreased, functional  Vision:      Functional Mobility:  Bed Mobility:  Rolling: Moderate assistance  Supine to Sit: Maximum assistance;Assist x2  Sit to Supine: Moderate assistance;Assist x2  Scooting: Maximum assistance  Transfers:  Sit to Stand: Maximum assistance;Assist x2; Additional time  Stand to Sit: Maximum assistance;Assist x2                       Balance:   Sitting: Impaired  Sitting - Static: Fair (occasional); Poor (constant support)  Sitting - Dynamic: Poor (constant support)  Standing: Impaired  Standing - Static: Poor  Standing - Dynamic : Poor  Ambulation/Gait Training:  Distance (ft): 2 Feet (ft)  Assistive Device: Gait belt;Walker, rolling  Ambulation - Level of Assistance: Maximum assistance;Assist x2     Gait Description (WDL): Exceptions to WDL  Gait Abnormalities: Decreased step clearance        Base of Support: Widened     Speed/Iva: Shuffled  Step Length: Right shortened;Left shortened     Physical Therapy Evaluation Charge Determination   History Examination Presentation Decision-Making   MEDIUM  Complexity : 1-2 comorbidities / personal factors will impact the outcome/ POC  MEDIUM Complexity : 3 Standardized tests and measures addressing body structure, function, activity limitation and / or participation in recreation  LOW Complexity : Stable, uncomplicated  LOW Complexity : FOTO score of       Based on the above components, the patient evaluation is determined to be of the following complexity level: LOW     Pain Rating:      Activity Tolerance:   Fair  Please refer to the flowsheet for vital signs taken during this treatment. After treatment patient left in no apparent distress:   Supine in bed and Call bell within reach    COMMUNICATION/EDUCATION:   The patients plan of care was discussed with: Registered Nurse. Fall prevention education was provided and the patient/caregiver indicated understanding., Patient/family have participated as able in goal setting and plan of care. , and Patient/family agree to work toward stated goals and plan of care.     Thank you for this referral.  Ave Allison, PT, DPT   Time Calculation: 25 mins

## 2020-01-10 NOTE — PROGRESS NOTES
Problem: Dysphagia (Adult)  Goal: *Acute Goals and Plan of Care (Insert Text)  Description  1/10/2020  Speech path goals  1. Pt will tolerate dys 1/purees and thins with no overt s/s of aspiration. 2. Pt will tolerate diet upgrade to dys 2/3 with thins with no overt s/s of aspiration. Outcome: Not Met   SPEECH LANGUAGE PATHOLOGY BEDSIDE SWALLOW EVALUATION  Patient: Alfonso Cochran (75 y.o. female)  Date: 1/10/2020  Primary Diagnosis: PNA (pneumonia) [J18.9]        Precautions: aspiration       ASSESSMENT :  Based on the objective data described below, the patient presents with mild to mod oropharyngeal dysphagia. Orally she is very slow to masticate the cracker but did so thoroughly. More timely with thins and purees. No oral residue. Pharyngeal phase is characterized by mild swallow delay and reduced hyolaryngeal excursion. She cleared her throat only once after the swallow of purees. She does not appear to be aspirating at this time. Discussed silent aspiration with her daughter. We will follow up Monday. If MBS is needed we will complete one at that time as indicated. Patient will benefit from skilled intervention to address the above impairments. Patients rehabilitation potential is considered to be Guarded     PLAN :  Recommendations and Planned Interventions:  Recommend purees and thins   They are familiar with thickened liquids but do not recommend them at this time. Frequency/Duration: Patient will be followed by speech-language pathology 4 times a week to address goals. Discharge Recommendations: None     SUBJECTIVE:   Patient was minimally verbal. Did not state her name.      OBJECTIVE:     Past Medical History:   Diagnosis Date    Disc disease, degenerative, lumbar or lumbosacral     HTN (hypertension) 4/9/2010    Hypercholesteremia 4/9/2010    Osteoporosis, age related      Past Surgical History:   Procedure Laterality Date    ABDOMEN SURGERY PROC UNLISTED      ENDOSCOPY, COLON, DIAGNOSTIC      HX CATARACT REMOVAL      HX GYN       Prior Level of Function/Home Situation:   Home Situation  Home Environment: Private residence  # Steps to Enter: 3  One/Two Story Residence: One story  Living Alone: No  Support Systems: Child(rosie)  Patient Expects to be Discharged to[de-identified] Rehabilitation facility  Current DME Used/Available at Home: [employer] Kussmaul, straight, Dorn Olszewski, rollator  Diet prior to admission: chopped food but did at times eat solids like chicken tenders   Current Diet:  purees/thins   Cognitive and Communication Status:  Neurologic State: Alert  Orientation Level: Other (Comment)(minimally verbal)  Cognition: Impaired decision making, No command following, Memory loss, Decreased attention/concentration           Oral Assessment:  Oral Assessment  Labial: No impairment  Dentition: Edentulous; Upper & lower dentures  Lingual: Other (comment)  Velum: Other (comment)  Mandible: No impairment  P.O. Trials:  Patient Position: upright in bed  Vocal quality prior to P.O.: No impairment  Consistency Presented: Thin liquid; Solid;Puree        Bolus Acceptance: Impaired  Bolus Formation/Control: Impaired  Type of Impairment: Delayed;Mastication  Propulsion: Delayed (# of seconds)  Oral Residue: None  Initiation of Swallow: Delayed (# of seconds)  Laryngeal Elevation: Functional  Aspiration Signs/Symptoms: Clear throat     Effective Modifications: None          Oral Phase Severity: Moderate  Pharyngeal Phase Severity : Mild-moderate    NOMS:   The NOMS functional outcome measure was used to quantify this patient's level of swallowing impairment. Based on the NOMS, the patient was determined to be at level 4 for swallow function       NOMS Swallowing Levels:  Level 1 (CN): NPO  Level 2 (CM): NPO but takes consistency in therapy  Level 3 (CL): Takes less than 50% of nutrition p.o. and continues with nonoral feedings; and/or safe with mod cues; and/or max diet restriction  Level 4 (CK):  Safe swallow but needs mod cues; and/or mod diet restriction; and/or still requires some nonoral feeding/supplements  Level 5 (CJ): Safe swallow with min diet restriction; and/or needs min cues  Level 6 (CI): Independent with p.o.; rare cues; usually self cues; may need to avoid some foods or needs extra time  Level 7 ([address] [place] [address]): Independent for all p.o.  DANK. (2003). National Outcomes Measurement System (NOMS): Adult Speech-Language Pathology User's Guide. Pain:             After treatment:   Call bell within reach    COMMUNICATION/EDUCATION:   Patient was educated regarding her deficit(s) of dysphagia as this relates to her diagnosis of pneumonia. She demonstrated Guarded understanding as evidenced by no response to questions. The patient's plan of care including recommendations, planned interventions, and recommended diet changes were discussed with: Registered Nurse. Patient is unable to participate in goal setting and plan of care.     Thank you for this referral.  Pastor Weir, SLP

## 2020-01-10 NOTE — PROGRESS NOTES
Problem: Self Care Deficits Care Plan (Adult)  Goal: *Acute Goals and Plan of Care (Insert Text)  Description      FUNCTIONAL STATUS PRIOR TO ADMISSION: Patient was independent without use of DME. Patient used a cane on occasion but refused to use a rollator. Patient able to perform ADLs. HOME SUPPORT PRIOR TO ADMISSION: The patient lived with her son who works night shift and has a daughter \"close by\". Occupational Therapy Goals  Initiated 1/10/2020  1. Patient will perform grooming standing with minimal assistance/contact guard assist within 7 day(s). 2.  Patient will perform upper body dressing with minimal assistance/contact guard assist within 7 day(s). 3.  Patient will perform lower body dressing with minimal assistance/contact guard assist within 7 day(s). 4.  Patient will perform toilet/BSC transfers with minimal assistance/contact guard assist within 7 day(s). 5.  Patient will perform all aspects of toileting with minimal assistance/contact guard assist within 7 day(s). Outcome: Progressing Towards Goal    OCCUPATIONAL THERAPY EVALUATION  Patient: Brie Barrera (8 y.o. female)  Date: 1/10/2020  Primary Diagnosis: PNA (pneumonia) [J18.9]        Precautions: Falls       ASSESSMENT  Based on the objective data described below, the patient presents with GW, decreased activity tolerance, confusion, decreased safety awareness and decreased balance which is impairing her functional independence. The patient is functioning below her independent to mod I baseline, now performing ADLs at a supervision/setup to mod A level and is mod A for functional mobility. The patient will benefit from acute OT intervention and will need SNF rehab after discharge. Functional Outcome Measure: The patient scored 25/100 on the Barthel Index outcome measure which is indicative of a 75% impairment in function.           PLAN :  Recommendations and Planned Interventions: self care training, functional mobility training, therapeutic exercise, balance training, therapeutic activities, cognitive retraining, endurance activities, patient education, home safety training, and family training/education    Frequency/Duration: Patient will be followed by occupational therapy 4 times a week to address goals. Recommendation for discharge: (in order for the patient to meet his/her long term goals)  Therapy up to 5 days/week in SNF setting    Equipment recommendations for successful discharge (if) home: TBD in SNF       OBJECTIVE DATA SUMMARY:   HISTORY:   Past Medical History:   Diagnosis Date    Disc disease, degenerative, lumbar or lumbosacral     HTN (hypertension) 4/9/2010    Hypercholesteremia 4/9/2010    Osteoporosis, age related      Past Surgical History:   Procedure Laterality Date    ABDOMEN SURGERY PROC UNLISTED      ENDOSCOPY, COLON, DIAGNOSTIC      HX CATARACT REMOVAL      HX GYN         Expanded or extensive additional review of patient history:     Home Situation  Home Environment: Private residence  # Steps to Enter: 3  Rails to Enter: Yes  One/Two Story Residence: One story  Living Alone: No  Support Systems: Child(rosie)  Patient Expects to be Discharged to[de-identified] Rehabilitation facility  Current DME Used/Available at Home: [employer], Walker, rollator    Hand dominance: Right    EXAMINATION OF PERFORMANCE DEFICITS:  Cognitive/Behavioral Status:  Neurologic State: Alert;Confused  Orientation Level: Oriented to person;Disoriented to place; Disoriented to situation;Disoriented to time  Cognition: Decreased attention/concentration;Decreased command following; Impaired decision making; Impulsive;Memory loss;Poor safety awareness        Safety/Judgement: Decreased awareness of environment;Decreased awareness of need for safety;Decreased insight into deficits      Hearing:   Auditory  Auditory Impairment: Hard of hearing, bilateral    Vision/Perceptual:    Acuity: Within Defined Limits    Corrective Lenses: Glasses    Range of Motion:  AROM: Generally decreased, functional    Strength:  Strength: Generally decreased, functional    Coordination:  Coordination: Generally decreased, functional            Tone & Sensation:  Tone: Normal          Balance:  Sitting: Intact  Sitting - Static: Fair (occasional); Poor (constant support)  Sitting - Dynamic: Poor (constant support)  Standing: Impaired; With support  Standing - Static: Poor;Constant support  Standing - Dynamic : Poor;Constant support    Functional Mobility and Transfers for ADLs:  Bed Mobility:  Rolling: Moderate assistance  Supine to Sit: Moderate assistance    Scooting: Moderate assistance    Transfers:  Sit to Stand: Moderate assistance  Stand to Sit: Moderate assistance  Bed to Chair: Moderate assistance;Assist x1(taking steps with RW. )    ADL Assessment:  Feeding: Supervision;Setup    Oral Facial Hygiene/Grooming: Supervision;Setup(seated EOB)    Bathing: Moderate assistance    Upper Body Dressing: Moderate assistance    Lower Body Dressing: Moderate assistance    Toileting: Moderate assistance                ADL Intervention and task modifications:  Feeding  Drink to Mouth: Supervision;Set-up    Grooming  Position Performed: Seated edge of bed  Washing Hands: Supervision;Set-up  Brushing/Combing Hair: Supervision;Set-up  Cues: Verbal cues provided              Upper Body Dressing Assistance  Shirt simulation with hospital gown: Moderate assistance; Compensatory technique training  Cues: Tactile cues provided;Verbal cues provided;Visual cues provided    Lower Body Dressing Assistance  Socks: Set-up; Supervision  Leg Crossed Method Used: Yes  Position Performed: Seated edge of bed;Bending forward method  Cues: Tactile cues provided;Verbal cues provided;Visual cues provided         Cognitive Retraining  Problem Solving: General alternative solution; Identifying the problem;Deductive reason  Organizing/Sequencing: Breaking task down;Prioritizing; Two step sequence  Attention to Task: Distractibility; Single task  Following Commands: Follows one step commands/directions  Safety/Judgement: Decreased awareness of environment;Decreased awareness of need for safety;Decreased insight into deficits  Cues: Tactile cues provided;Verbal cues provided;Visual cues provided      Functional Measure:  Barthel Index:    Bathin  Bladder: 0  Bowels: 10  Groomin  Dressin  Feeding: 10  Mobility: 0  Stairs: 0  Toilet Use: 0  Transfer (Bed to Chair and Back): 5  Total: 25/100        Percentage of impairment   0%   1-19%   20-39%   40-59%   60-79%   80-99%   100%   Barthel Score 0-100 100 99-80 79-60 59-40 20-39 1-19   0     The Barthel ADL Index: Guidelines  1. The index should be used as a record of what a patient does, not as a record of what a patient could do. 2. The main aim is to establish degree of independence from any help, physical or verbal, however minor and for whatever reason. 3. The need for supervision renders the patient not independent. 4. A patient's performance should be established using the best available evidence. Asking the patient, friends/relatives and nurses are the usual sources, but direct observation and common sense are also important. However direct testing is not needed. 5. Usually the patient's performance over the preceding 24-48 hours is important, but occasionally longer periods will be relevant. 6. Middle categories imply that the patient supplies over 50 per cent of the effort. 7. Use of aids to be independent is allowed. Zaria Benavidez., Barthel, D.W. (4047). Functional evaluation: the Barthel Index. [address] [facility] (14)2. Sandria Cogan [place], J.J.M.F, Faiza Hyman, Jessica Kendrick, [place], [address] [place] [address] (). Measuring the change indisability after inpatient rehabilitation; comparison of the responsiveness of the Barthel Index and Functional [place] Measure. Journal of Neurology, Neurosurgery, and Psychiatry, 66(4), 913-674.   Abril Bocanegra STEPHANIE, BIBI Agarwal, & Pamela Hines M.A. (2004.) Assessment of post-stroke quality of life in cost-effectiveness studies: The usefulness of the Barthel Index and the EuroQoL-5D. Quality of Life Research, 13, 592-32        Activity Tolerance:   Fair  Please refer to the flowsheet for vital signs taken during this treatment. After treatment patient left in no apparent distress:    Sitting in chair, Call bell within reach, and Caregiver / family present    COMMUNICATION/EDUCATION:   The patients plan of care was discussed with: Physical Therapist and Registered Nurse. Patient is unable to participate in goal setting and plan of care. This patients plan of care is appropriate for delegation to [facility].     Thank you for this referral.  Chris Vee, OTR/L  Time Calculation: 40 mins

## 2020-01-10 NOTE — PROGRESS NOTES
Hospitalist Progress Note    NAME: Deni Deutsch   :  7/3/1924   MRN:  [id number]     Interim Hospital Summary: 80 y.o. female whom presented on 2020 with      Assessment / Plan:    CAP vs aspiration PNA  UTI  -CXR Mild interstitial prominence, bibasilar atelectasis or scar left greater than right with left perihilar atelectasis or developing infiltrate. Correlate for developing mild interstitial edema versus inflammatory infiltrate  -continue empiric zosyn and zithromax  -wean oxygen    Dehydration from diminished intake  Constipation / fecal impaction  Bladder distention  -CT abdomen Fecal impaction. Bladder distention. Mild prominence of the collecting system on the right  -gentle hydration  -start miralax  -leave james. [place] in a week at SNF    PAF with RVR  HTN  -Echo pending. TSH wnl  -can switch to cardizem pending Echo results. Acute metabolic encephalopathy in setting of dementia  Generalized weakness  -CT head nothing acute  -PT OT eval and treat. Likely will need SNF rehab    18.5 - 24.9 Normal weight / Body mass index is 21.26 kg/m². Code status: DNR  Prophylaxis: Hep SQ  Recommended Disposition: SNF/LTC. Lives with son. She has had a gradual decline over the last few months. Subjective:     Chief Complaint / Reason for Physician Visit  Follow up of UTI, PNA, dehydration, Afib RVR, fecal impaction, Weakness  Chart reviewed in detail. Discussed with RN events overnight.        Review of Systems:  Symptom Y/N Comments  Symptom Y/N Comments   Fever/Chills    Chest Pain     Poor Appetite    Edema     Cough    Abdominal Pain     Sputum    Joint Pain     SOB/[place]    Pruritis/Rash     Nausea/vomit    Tolerating PT/OT     Diarrhea    Tolerating Diet     Constipation    Other       Could NOT obtain due to:      PO intake:   Patient Vitals for the past 72 hrs:   % Diet Eaten   01/10/20 1115 25 %   20 1700 50 %     Objective: VITALS:   Last 24hrs VS reviewed since prior progress note. Most recent are:  Patient Vitals for the past 24 hrs:   Temp Pulse Resp BP SpO2   01/10/20 1203 97.3 °F (36.3 °C) 62 18 117/51 94 %   01/10/20 0915 98.6 °F (37 °C) 63 20 112/54 93 %   01/10/20 0523     95 %   01/10/20 0518 98.7 °F (37.1 °C) 76 20 159/71 (!) 88 %   01/10/20 0400 98.8 °F (37.1 °C) 74 20 148/60 97 %   01/10/20 0200 99.3 °F (37.4 °C) 87 20 165/82 98 %   01/09/20 2200  86 29 166/80    01/09/20 2100  87 21 (!) 178/94    01/09/20 1900  92 25 177/88    01/09/20 1728  88 25 198/87    01/09/20 1700  (!) 124 23 (!) 197/98    01/09/20 1500  96 24 (!) 203/78 96 %       Intake/Output Summary (Last 24 hours) at 1/10/2020 1456  Last data filed at 1/10/2020 1115  Gross per 24 hour   Intake 1277.5 ml   Output 925 ml   Net 352.5 ml        PHYSICAL EXAM:  General: WD, WN. Alert, cooperative, no acute distress    EENT:  EOMI. Anicteric sclerae. MMM  Resp:  Coarse BS. No accessory muscle use  CV:  Regular  rhythm,  No edema  GI:  Soft, Non distended, Non tender.  +Bowel sounds  Neurologic:  Arouses to voice/touch. Confused. Psych:   Unable to assess, sleepy   Skin:  No rashes. No jaundice    Reviewed most current lab test results and cultures  YES  Reviewed most current radiology test results   YES  Review and summation of old records today    NO  Reviewed patient's current orders and MAR    YES  PMH/SH reviewed - no change compared to H&P  ________________________________________________________________________  Care Plan discussed with:    Comments   Patient     Family  x daughters   RN     Care Manager     Consultant                        Multidiciplinary team rounds were held today with , nursing, pharmacist and clinical coordinator. Patient's plan of care was discussed; medications were reviewed and discharge planning was addressed.      ________________________________________________________________________  Total NON critical care TIME:  45   Minutes    Total CRITICAL CARE TIME Spent:   Minutes non procedure based      Comments   >50% of visit spent in counseling and coordination of care x     This includes time during multidisciplinary rounds if indicated above   ________________________________________________________________________  Arley Goodell, MD     Procedures: see electronic medical records for all procedures/Xrays and details which were not copied into this note but were reviewed prior to creation of Plan. LABS:  I reviewed today's most current labs and imaging studies.   Pertinent labs include:  Recent Labs     01/10/20  0550 01/09/20  1253   WBC 15.8* 16.5*   HGB 12.3 12.9   HCT 37.7 40.1    205     Recent Labs     01/10/20  0414 01/09/20  1400   * 135*   K 3.5 3.4*    101   CO2 27 27   * 129*   BUN 15 14   CREA 0.71 0.66   CA 8.9 8.5   ALB 2.9* 3.0*   TBILI 1.3* 1.1*   SGOT 26 19   ALT 17 16

## 2020-01-10 NOTE — PROGRESS NOTES
TRANSFER - IN REPORT:    Verbal report received from [facility] RN(name) on [facility]  being received from ED(unit) for routine progression of care      Report consisted of patients Situation, Background, Assessment and   Recommendations(SBAR). Information from the following report(s) SBAR, Kardex, ED Summary, Procedure Summary, Intake/Output, MAR, Recent Results and Cardiac Rhythm NSR was reviewed with the receiving nurse. Opportunity for questions and clarification was provided. Assessment completed upon patients arrival to unit and care assumed.

## 2020-01-10 NOTE — ED NOTES
Patient is awake, she is confused, she is pulling at bed linens, family continued at bedside and assisting with reorientation of patient. Lights turned down to calm environment.

## 2020-01-10 NOTE — ED NOTES
Concern for patient's urine output being low. Bladder scanned patient - 614 mL in bladder. Sent consult request to hospitalist for orders.

## 2020-01-10 NOTE — PROGRESS NOTES
Pharmacy - Enoxaparin (Lovenox®) Monitoring      Indication: DVT prophylaxis     Current Dose: Enoxaparin 40 mg subcutaneously every 24 hours    Creatinine Clearance (mL/min): 42    Labs:  Recent Labs     01/09/20  1400 01/09/20  1253   CREA 0.66  --    HGB  --  12.9   PLT  --  205     Wt Readings from Last 1 Encounters:   01/09/20 54.4 kg (120 lb)     Ht Readings from Last 1 Encounters:   01/09/20 160 cm (63\")       Impression/Plan:   Enoxaparin adjusted to heparin 5,000 units Q12H per anticoagulation protocol. For low body weight patient. Thanks,  Jesenia Urrutia [facility]      [email or web address]/[email or web address]%20protocol. pdf

## 2020-01-10 NOTE — PROGRESS NOTES
Bedside shift change report given to Errol Olszewski RN (oncoming nurse) by Erik Simmons RN (offgoing nurse). Report included the following information SBAR, Kardex, Intake/Output, MAR, Recent Results and Cardiac Rhythm AF.

## 2020-01-10 NOTE — PROGRESS NOTES
Patient sleeping and snoring O2 on room air 93%, aroused by physical stimuli, unable to assess orientation because patient will not verbalize just eye contact, daughters at bedside states this is not her base line, pt able to take pill with applesauce and water.  Will continue to monitor

## 2020-01-10 NOTE — H&P
Hospitalist Admission Note    NAME: Ok Scales   :  7/3/1924   MRN:  [id number]     Date/Time:  2020 7:53 PM    Patient PCP: Preston Armijo, NP  ______________________________________________________________________  Given the patient's current clinical presentation, I have a high level of concern for decompensation if discharged from the emergency department. Complex decision making was performed, which includes reviewing the patient's available past medical records, laboratory results, and x-ray films. My assessment of this patient's clinical condition and my plan of care is as follows. Assessment / Plan:  CAP vs Aspiration PNA on CXR with UTI causing generalized weakness and inability to walk  Admit to tele  IV Zosyn and Zithromax  Speech consult  Pureed diet for now  CT head negative  PT/OT  UA done but no urine cultures sent, will ask to addon urine cultures to UA specimen  CXR with Mild interstitial prominence, bibasilar atelectasis or scar left greater than  right with left perihilar atelectasis or developing infiltrate.  Correlate for developing mild interstitial edema versus inflammatory infiltrate    A.fib with RVR on tele as per bedside RN and ED NP report  EKG done after dose of IV metoprolol by the time pt seems back to sinus  Check TSH, 2D echo and ask cardiology consult  Start PO metoprolol    Accelerated HTN/Hypertensive Urgency  Due to non compliance in settings of dementia and she lives with  who can't take care of her  BB as above  Restart norvasc in evening  PRN nitropaste and IV hydralazine    Hypercholesteremia   Holding statins, not sure if any benefit with her age    Senile Dementia  Not formaly diagnosed but per family definitely has dementia    Code Status: DNR/DNI d/w daughter  Surrogate Decision Maker: Daughter Herberth Cash    DVT Prophylaxis: SQ heparin    Baseline: functional      Subjective:   CHIEF COMPLAINT: unable to get up    HISTORY OF PRESENT ILLNESS:     Lorena Farley is a 80 y.o.  female who presents to ED as she was unable to get up. As per family, pt was feeling generalized weakness and unable to getup at home. In ED, pt noted to have abnormal CXR with Mild interstitial prominence, bibasilar atelectasis or scar left greater than right with left perihilar atelectasis or developing infiltrate. Correlate for developing mild interstitial edema versus inflammatory infiltrate. On tele pt noted to have brief episode of a.fib which resolved with IV metoprolol. She also noted to have ? UTI. We were asked to admit for work up and evaluation of the above problems. Past Medical History:   Diagnosis Date    Disc disease, degenerative, lumbar or lumbosacral     HTN (hypertension) 2010    Hypercholesteremia 2010    Osteoporosis, age related         Past Surgical History:   Procedure Laterality Date    ABDOMEN SURGERY PROC UNLISTED      ENDOSCOPY, COLON, DIAGNOSTIC      HX CATARACT REMOVAL      HX GYN         Social History     Tobacco Use    Smoking status: Former Smoker     Last attempt to quit: 5/3/1986     Years since quittin.7    Smokeless tobacco: Never Used   Substance Use Topics    Alcohol use: Yes     Comment: wine occas        Family History   Problem Relation Age of Onset    Heart Disease Mother     Cancer Father         colon    No Known Problems Sister     No Known Problems Brother      No Known Allergies     Prior to Admission medications    Medication Sig Start Date End Date Taking?  Authorizing Provider   lisinopril (PRINIVIL, ZESTRIL) 20 mg tablet TAKE 1 TABLET BY MOUTH ONCE DAILY 19   Jaiden Brooks, NP   simvastatin (ZOCOR) 20 mg tablet TAKE 1 TABLET BY MOUTH ONCE DAILY IN THE EVENING 19   Jaiden Brooks NP   amLODIPine (NORVASC) 10 mg tablet TAKE 1 TABLET BY MOUTH ONCE DAILY 19   Jaiden Brooks NP   hydroCHLOROthiazide (HYDRODIURIL) 12.5 mg tablet TAKE 1 TABLET BY MOUTH ONCE DAILY 4/5/19   Allgood, Claudell Fells, NP       REVIEW OF SYSTEMS:     I am not able to complete the review of systems because: The patient is intubated and sedated    The patient has altered mental status due to his acute medical problems    The patient has baseline aphasia from prior stroke(s)   y The patient has baseline dementia and is not reliable historian    The patient is in acute medical distress and unable to provide information           Objective:   VITALS:    Visit Vitals  /88   Pulse 92   Temp 97.9 °F (36.6 °C)   Resp 25   Ht 5' 3\" (1.6 m)   Wt 54.4 kg (120 lb)   SpO2 96%   BMI 21.26 kg/m²       PHYSICAL EXAM:    General:    Alert, cooperative, no distress, appears stated age. HEENT: Atraumatic, anicteric sclerae, pink conjunctivae     No oral ulcers, mucosa moist, throat clear, dentition fair  Neck:  Supple, symmetrical,  thyroid: non tender  Lungs:   crackles. No Wheezing or Rhonchi. No rales. Chest wall:  No tenderness  No Accessory muscle use. Heart:   Regular  rhythm,  No  murmur   No edema  Abdomen:   Soft, non-tender. Not distended. Bowel sounds normal  Extremities: No cyanosis. No clubbing,      Skin turgor normal, Capillary refill normal, Radial dial pulse 2+  Skin:     Not pale. Not Jaundiced  No rashes   Psych:  Poor insight. Not depressed. Not anxious or agitated. Neurologic: EOMs intact. No facial asymmetry. No aphasia or slurred speech. Symmetrical strength, Sensation grossly intact.  Alert.     _______________________________________________________________________  Care Plan discussed with:    Comments   Patient y    Family  y [place] daughter at bedside and daughter over the phone   RN     Care Manager                    Consultant:  y ED NP   _______________________________________________________________________  Expected  Disposition:   Home with Family    HH/PT/OT/RN    SNF/LTC y   Dominic ________________________________________________________________________  TOTAL TIME: 60 Minutes    Critical Care Provided     Minutes non procedure based      Comments    y Reviewed previous records   >50% of visit spent in counseling and coordination of care y Discussion with patient and family and questions answered       ________________________________________________________________________  Signed: Kaycee Parmar MD    Procedures: see electronic medical records for all procedures/Xrays and details which were not copied into this note but were reviewed prior to creation of Plan. LAB DATA REVIEWED:    Recent Results (from the past 24 hour(s))   EKG, 12 LEAD, INITIAL    Collection Time: 01/09/20 12:33 PM   Result Value Ref Range    Ventricular Rate 87 BPM    Atrial Rate 87 BPM    P-R Interval 160 ms    QRS Duration 72 ms    Q-T Interval 342 ms    QTC Calculation (Bezet) 411 ms    Calculated P Axis 54 degrees    Calculated R Axis 3 degrees    Calculated T Axis 35 degrees    Diagnosis       Sinus rhythm with premature supraventricular complexes  No previous ECGs available     CBC WITH AUTOMATED DIFF    Collection Time: 01/09/20 12:53 PM   Result Value Ref Range    WBC 16.5 (H) 3.6 - 11.0 K/uL    RBC 4.52 3.80 - 5.20 M/uL    HGB 12.9 11.5 - 16.0 g/dL    HCT 40.1 35.0 - 47.0 %    MCV 88.7 80.0 - 99.0 FL    MCH 28.5 26.0 - 34.0 PG    MCHC 32.2 30.0 - 36.5 g/dL    RDW 15.1 (H) 11.5 - 14.5 %    PLATELET 221 504 - 658 K/uL    MPV 10.7 8.9 - 12.9 FL    NRBC 0.0 0  WBC    ABSOLUTE NRBC 0.00 0.00 - 0.01 K/uL    NEUTROPHILS 67 32 - 75 %    LYMPHOCYTES 24 12 - 49 %    MONOCYTES 8 5 - 13 %    EOSINOPHILS 0 0 - 7 %    BASOPHILS 0 0 - 1 %    IMMATURE GRANULOCYTES 1 (H) 0.0 - 0.5 %    ABS. NEUTROPHILS 11.1 (H) 1.8 - 8.0 K/UL    ABS. LYMPHOCYTES 3.9 (H) 0.8 - 3.5 K/UL    ABS. MONOCYTES 1.4 (H) 0.0 - 1.0 K/UL    ABS. EOSINOPHILS 0.0 0.0 - 0.4 K/UL    ABS. BASOPHILS 0.0 0.0 - 0.1 K/UL    ABS. IMM.  GRANS. 0.1 (H) 0.00 - 0.04 K/UL    DF AUTOMATED     URINALYSIS W/MICROSCOPIC    Collection Time: 01/09/20  2:00 PM   Result Value Ref Range    Color YELLOW/STRAW      Appearance CLOUDY (A) CLEAR      Specific gravity 1.020 1.003 - 1.030      pH (UA) 7.0 5.0 - 8.0      Protein 30 (A) NEG mg/dL    Glucose NEGATIVE  NEG mg/dL    Ketone NEGATIVE  NEG mg/dL    Bilirubin NEGATIVE  NEG      Blood SMALL (A) NEG      Urobilinogen 1.0 0.2 - 1.0 EU/dL    Nitrites NEGATIVE  NEG      Leukocyte Esterase TRACE (A) NEG      WBC 10-20 0 - 4 /hpf    RBC 10-20 0 - 5 /hpf    Epithelial cells FEW FEW /lpf    Bacteria 1+ (A) NEG /hpf    Hyaline cast 0-2 0 - 5 /lpf   METABOLIC PANEL, COMPREHENSIVE    Collection Time: 01/09/20  2:00 PM   Result Value Ref Range    Sodium 135 (L) 136 - 145 mmol/L    Potassium 3.4 (L) 3.5 - 5.1 mmol/L    Chloride 101 97 - 108 mmol/L    CO2 27 21 - 32 mmol/L    Anion gap 7 5 - 15 mmol/L    Glucose 129 (H) 65 - 100 mg/dL    BUN 14 6 - 20 MG/DL    Creatinine 0.66 0.55 - 1.02 MG/DL    BUN/Creatinine ratio 21 (H) 12 - 20      GFR est AA >60 >60 ml/min/1.73m2    GFR est non-AA >60 >60 ml/min/1.73m2    Calcium 8.5 8.5 - 10.1 MG/DL    Bilirubin, total 1.1 (H) 0.2 - 1.0 MG/DL    ALT (SGPT) 16 12 - 78 U/L    AST (SGOT) 19 15 - 37 U/L    Alk.  phosphatase 81 45 - 117 U/L    Protein, total 7.4 6.4 - 8.2 g/dL    Albumin 3.0 (L) 3.5 - 5.0 g/dL    Globulin 4.4 (H) 2.0 - 4.0 g/dL    A-G Ratio 0.7 (L) 1.1 - 2.2

## 2020-01-11 NOTE — PROGRESS NOTES
Pharmacy Automatic Renal Dosing Protocol - Antimicrobials    Indication for Antimicrobials: aspiration PNA     Current Regimen of Each Antimicrobial:  Zosyn 3.375g IV every 8 (Start Date 1/10; Day # 2)    Previous Antimicrobial Therapy:    Significant Cultures:   : urine - pending  : blood - pending    Radiology / Imaging results: (X-ray, CT scan or MRI):   : CT head: no hemorrhage or infarct  : CT abdomen/pelvis: IMPRESSION:Fecal impaction. Bladder distention. Mild prominence of the collecting system on the right. : chest xray:Mild interstitial prominence, bibasilar atelectasis or scar left greater than  right with left perihilar atelectasis or developing infiltrate. Correlate for  developing mild interstitial edema versus inflammatory infiltrate. .      Paralysis, amputations, malnutrition: none    Labs:  Recent Labs     20  0636 01/10/20  0550 01/10/20  0414 20  1400 20  1253   CREA 0.59  --  0.71 0.66  --    BUN 19  --  15 14  --    WBC 10.6 15.8*  --   --  16.5*     Temp (24hrs), Av.1 °F (36.7 °C), Min:97.6 °F (36.4 °C), Max:98.8 °F (37.1 °C)    Creatinine Clearance (mL/min) or Dialysis: ~47    Impression/Plan:   Continue zosyn as above  Antimicrobial stop date to be determined     Pharmacy will follow daily and adjust medications as appropriate for renal function and/or serum levels. Thank you,  CHAZ [place]    Recommended duration of therapy  [email or web address]/[place]/[facility]/Pharmacy/Clinical%20Companion/Duration%20of%20ABX%20therapy. docx    Renal Dosing  [email or web address]/[place]/[facility]/Pharmacy/Clinical%20Companion/Renal%[id number]. pdf

## 2020-01-11 NOTE — PROGRESS NOTES
Hospitalist Progress Note    NAME: Ashwini Mcclain   :  7/3/1924   MRN:  [id number]     Interim Hospital Summary: 80 y.o. female whom presented on 2020 with      Assessment / Plan:    CAP vs aspiration PNA  UTI  -CXR Mild interstitial prominence, bibasilar atelectasis or scar left greater than right with left perihilar atelectasis or developing infiltrate. Correlate for developing mild interstitial edema versus inflammatory infiltrate  -UCx NGTD  -continue empiric zosyn and zithromax  -off oxygen    Dehydration from diminished intake  Constipation / fecal impaction  Bladder distention  -CT abdomen Fecal impaction. Bladder distention. Mild prominence of the collecting system on the right  -gentle hydration, wean as PO intake improves. She is much more awake today  -continue miralax  -leave james. [place] in a week at SNF    PAF with RVR  HTN  -Echo pending. TSH wnl  -still having runs of SVT overnight and vasiliy event this morning after BB  -switch to cardizem. Stop lopressor / norvasc    Acute metabolic encephalopathy in setting of dementia  Generalized weakness  -CT head nothing acute  -PT OT eval and treat. Likely will need SNF rehab    18.5 - 24.9 Normal weight / Body mass index is 21.26 kg/m². Code status: DNR  Prophylaxis: Hep SQ  Recommended Disposition: SNF/LTC. Lives with son. She has had a gradual decline over the last few months. Subjective:     Chief Complaint / Reason for Physician Visit  Follow up of UTI, PNA, dehydration, Afib RVR, fecal impaction, Weakness  Chart reviewed in detail. Discussed with RN events overnight.        Review of Systems:  Symptom Y/N Comments  Symptom Y/N Comments   Fever/Chills    Chest Pain     Poor Appetite    Edema     Cough    Abdominal Pain     Sputum    Joint Pain     SOB/[place]    Pruritis/Rash     Nausea/vomit    Tolerating PT/OT     Diarrhea    Tolerating Diet     Constipation    Other       Could NOT obtain due to:      PO intake:   Patient Vitals for the past 72 hrs:   % Diet Eaten   01/10/20 1115 25 %   01/09/20 1700 50 %     Objective:     VITALS:   Last 24hrs VS reviewed since prior progress note. Most recent are:  Patient Vitals for the past 24 hrs:   Temp Pulse Resp BP SpO2   01/11/20 1306 97.8 °F (36.6 °C) 60 18 142/61 91 %   01/11/20 0759 97.6 °F (36.4 °C) 74 18 150/74 95 %   01/11/20 0305 98.8 °F (37.1 °C) 71 18 168/75 91 %   01/10/20 2350 98.1 °F (36.7 °C) 70 18 135/62 94 %   01/10/20 1954 97.8 °F (36.6 °C) 67 18 128/59 91 %   01/10/20 1545 98 °F (36.7 °C) 70 18 144/64 93 %       Intake/Output Summary (Last 24 hours) at 1/11/2020 1401  Last data filed at 1/11/2020 1320  Gross per 24 hour   Intake    Output 650 ml   Net -650 ml        PHYSICAL EXAM:  General: WD, WN. Alert, cooperative, no acute distress    EENT:  EOMI. Anicteric sclerae. MMM  Resp:  Coarse BS. No accessory muscle use  CV:  Regular  rhythm,  No edema  GI:  Soft, Non distended, Non tender.  +Bowel sounds  Neurologic:  Arouses to voice/touch. Confused. Psych:   Unable to assess, sleepy   Skin:  No rashes. No jaundice    Reviewed most current lab test results and cultures  YES  Reviewed most current radiology test results   YES  Review and summation of old records today    NO  Reviewed patient's current orders and MAR    YES  PMH/SH reviewed - no change compared to H&P  ________________________________________________________________________  Care Plan discussed with:    Comments   Patient     Family  x daughters   RN     Care Manager     Consultant                        Multidiciplinary team rounds were held today with , nursing, pharmacist and clinical coordinator. Patient's plan of care was discussed; medications were reviewed and discharge planning was addressed.      ________________________________________________________________________  Total NON critical care TIME:  45   Minutes    Total CRITICAL CARE TIME Spent: Minutes non procedure based      Comments   >50% of visit spent in counseling and coordination of care x     This includes time during multidisciplinary rounds if indicated above   ________________________________________________________________________  Olu Acosta MD     Procedures: see electronic medical records for all procedures/Xrays and details which were not copied into this note but were reviewed prior to creation of Plan. LABS:  I reviewed today's most current labs and imaging studies.   Pertinent labs include:  Recent Labs     01/11/20  0636 01/10/20  0550 01/09/20  1253   WBC 10.6 15.8* 16.5*   HGB 10.2* 12.3 12.9   HCT 31.8* 37.7 40.1    176 205     Recent Labs     01/11/20  0636 01/10/20  0414 01/09/20  1400    132* 135*   K 3.6 3.5 3.4*    100 101   CO2 25 27 27   * 130* 129*   BUN 19 15 14   CREA 0.59 0.71 0.66   CA 8.0* 8.9 8.5   MG 2.4  --   --    ALB  --  2.9* 3.0*   TBILI  --  1.3* 1.1*   SGOT  --  26 19   ALT  --  17 16

## 2020-01-11 NOTE — PROGRESS NOTES
Orthopedic End of Shift Note    Bedside shift change report given to Francine (oncoming nurse) by Jyothi Cisse (offgoing nurse). Report included the following information SBAR, Kardex, Intake/Output, MAR and Cardiac Rhythm NSR.      POD#     Significant issues during shift: none    Issues for Physician to address: none    Activity This Shift  (check all that apply) [] chair  [] dangle   [] bathroom  [] bedside commode [] hallway  [x] bedrest   Nausea/Vomiting [] yes [x] no     Voiding Status [] void [x] Catherine [] I&O Cath   Bowel Movements [x] yes [] no     Foot Pumps or SCD [] yes [x] no    Ice Pack [] yes    [x] no    Incentive Spirometer [] yes [x] no Volume:  NSR   Telemetry Monitoring   [x] yes [] no Rhythm:   Supplemental O2 [x] yes [] no Sat off O2:   87%

## 2020-01-11 NOTE — PROGRESS NOTES
Spiritual Care Assessment/Progress Note  [facility]      NAME: Philip Garcia      MRN: [id number]  AGE: 80 y.o. SEX: female  Pentecostal Affiliation: Don   Language: English     1/11/2020     Total Time (in minutes): 11     Spiritual Assessment begun in [facility] through conversation with:         []Patient        [] Family    [] Friend(s)        Reason for Consult: Palliative Care, Initial/Spiritual Assessment     Spiritual beliefs: (Please include comment if needed)     [] Identifies with a sultana tradition:         [] Supported by a sultana community:            [] Claims no spiritual orientation:           [] Seeking spiritual identity:                [] Adheres to an individual form of spirituality:           [x] Not able to assess:                           Identified resources for coping:      [] Prayer                               [] Music                  [] Guided Imagery     [] Family/friends                 [] Pet visits     [] Devotional reading                         [x] Unknown     [] Other:                                              Interventions offered during this visit: (See comments for more details)                Plan of Care:     [] Support spiritual and/or cultural needs    [] Support AMD and/or advance care planning process      [] Support grieving process   [] Coordinate Rites and/or Rituals    [] Coordination with community clergy   [x] No spiritual needs identified at this time   [] Detailed Plan of Care below (See Comments)  [] Make referral to Music Therapy  [] Make referral to Pet Therapy     [] Make referral to Addiction services  [] Make referral to [facility]  [] Make referral to Spiritual Care Partner  [] No future visits requested        [x] Follow up visits as needed     Comments:   Patient was visited on Ortho to make a spiritual assessment. The nurse indicated that the patient was unavailable.  Chaplains will follow as able and/or needed. Rev.  Darryle Shelling, EdD MDiv Chaplain   For  Assistance Page [phone])

## 2020-01-11 NOTE — PROGRESS NOTES
Blanchable redness to sacrum. Zinc oxide cream and foam applied. Patient turned on left side.  Will put patient on turn team

## 2020-01-12 NOTE — PROGRESS NOTES
Hospitalist Progress Note    NAME: Randie Gitelman   :  7/3/1924   MRN:  [id number]     Interim Hospital Summary: 80 y.o. female whom presented on 2020 with      Assessment / Plan:    CAP vs aspiration PNA  UTI  -CXR Mild interstitial prominence, bibasilar atelectasis or scar left greater than right with left perihilar atelectasis or developing infiltrate. Correlate for developing mild interstitial edema versus inflammatory infiltrate  -UCx NGTD  -continue empiric zosyn and zithromax D4  -off oxygen    Dehydration from diminished intake  Constipation / fecal impaction  Bladder distention  -CT abdomen Fecal impaction. Bladder distention. Mild prominence of the collecting system on the right  -Suspect fecal impaction was related to bladder distention, dehydration and decrease mobility  -continue miralax  -leave [facility]. [place] in a week at SNF  -KUB today. PAF with RVR  HTN  -Echo pending. TSH wnl  -still having runs of SVT this AM so will increase cardizem 60 QID    Acute metabolic encephalopathy in setting of dementia  Generalized weakness  -CT head nothing acute  -PT OT eval and treat. Likely will need SNF rehab    18.5 - 24.9 Normal weight / Body mass index is 21.26 kg/m². Code status: DNR  Prophylaxis: Hep SQ  Recommended Disposition: SNF/LTC. Lives with son. She has had a gradual decline over the last few months. Subjective:     Chief Complaint / Reason for Physician Visit  Follow up of UTI, PNA, dehydration, Afib RVR, fecal impaction, Weakness  Chart reviewed in detail. Discussed with RN events overnight.    Good BM overnight and this AM    Review of Systems:  Symptom Y/N Comments  Symptom Y/N Comments   Fever/Chills    Chest Pain     Poor Appetite    Edema     Cough    Abdominal Pain     Sputum    Joint Pain     SOB/[place]    Pruritis/Rash     Nausea/vomit    Tolerating PT/OT     Diarrhea    Tolerating Diet     Constipation    Other Could NOT obtain due to:      PO intake:   Patient Vitals for the past 72 hrs:   % Diet Eaten   01/10/20 1115 25 %   01/09/20 1700 50 %     Objective:     VITALS:   Last 24hrs VS reviewed since prior progress note. Most recent are:  Patient Vitals for the past 24 hrs:   Temp Pulse Resp BP SpO2   01/12/20 0758 97.5 °F (36.4 °C) 79 18 182/80 94 %   01/12/20 0319 99 °F (37.2 °C) 77 18 143/60 95 %   01/11/20 2354 99 °F (37.2 °C) 76 18 160/66 94 %   01/11/20 2015 98.6 °F (37 °C) 75 18 174/77 96 %   01/11/20 1626 97.7 °F (36.5 °C) 70 18 165/62 95 %   01/11/20 1306 97.8 °F (36.6 °C) 60 18 142/61 91 %       Intake/Output Summary (Last 24 hours) at 1/12/2020 0946  Last data filed at 1/12/2020 0646  Gross per 24 hour   Intake    Output 1750 ml   Net -1750 ml        PHYSICAL EXAM:  General: WD, WN. Alert, cooperative, no acute distress    EENT:  EOMI. Anicteric sclerae. MMM  Resp:  Coarse BS. No accessory muscle use  CV:  Regular  rhythm,  No edema  GI:  Soft, Non distended, Non tender.  +Bowel sounds  Neurologic:  Arouses to voice/touch. Confused. Psych:   Unable to assess, sleepy   Skin:  No rashes. No jaundice    Reviewed most current lab test results and cultures  YES  Reviewed most current radiology test results   YES  Review and summation of old records today    NO  Reviewed patient's current orders and MAR    YES  PMH/ reviewed - no change compared to H&P  ________________________________________________________________________  Care Plan discussed with:    Comments   Patient     Family  x daughters   RN     Care Manager     Consultant                        Multidiciplinary team rounds were held today with , nursing, pharmacist and clinical coordinator. Patient's plan of care was discussed; medications were reviewed and discharge planning was addressed.      ________________________________________________________________________  Total NON critical care TIME:  45   Minutes    Total CRITICAL CARE TIME Spent:   Minutes non procedure based      Comments   >50% of visit spent in counseling and coordination of care x     This includes time during multidisciplinary rounds if indicated above   ________________________________________________________________________  Angelic Paris MD     Procedures: see electronic medical records for all procedures/Xrays and details which were not copied into this note but were reviewed prior to creation of Plan. LABS:  I reviewed today's most current labs and imaging studies.   Pertinent labs include:  Recent Labs     01/11/20  0636 01/10/20  0550 01/09/20  1253   WBC 10.6 15.8* 16.5*   HGB 10.2* 12.3 12.9   HCT 31.8* 37.7 40.1    176 205     Recent Labs     01/12/20  0515 01/11/20  0636 01/10/20  0414 01/09/20  1400    138 132* 135*   K 3.4* 3.6 3.5 3.4*    107 100 101   CO2 28 25 27 27   * 124* 130* 129*   BUN 9 19 15 14   CREA 0.52* 0.59 0.71 0.66   CA 8.3* 8.0* 8.9 8.5   MG  --  2.4  --   --    ALB  --   --  2.9* 3.0*   TBILI  --   --  1.3* 1.1*   SGOT  --   --  26 19   ALT  --   --  17 16

## 2020-01-12 NOTE — PROGRESS NOTES
Orthopedic End of Shift Note    Bedside shift change report given to Francine (oncoming nurse) by Andres Muller (offgoing nurse). Report included the following information SBAR, Kardex, Intake/Output and MAR. POD#     Significant issues during shift: none    Issues for Physician to address:      Activity This Shift  (check all that apply) [] chair  [] dangle   [] bathroom  [] bedside commode [] hallway  [x] bedrest   Nausea/Vomiting [] yes [x] no     Voiding Status [] void [x] Catherine [] I&O Cath   Bowel Movements [x] yes [] no     Foot Pumps or SCD [] yes [x] no    Ice Pack [] yes    [x] no    Incentive Spirometer [] yes [x] no Volume:     Telemetry Monitoring   [x] yes [] no Rhythm:   Supplemental O2 [] yes [x] no Sat off O2:   %

## 2020-01-12 NOTE — PROGRESS NOTES
1930 - Bedside shift change report given to Lorena Morelos RN (oncoming nurse) by [address] [facility] [address], RN (offgoing nurse). Report included the following information SBAR, Kardex, Procedure Summary, Intake/Output, MAR, Accordion, Recent Results, Med Rec Status and Cardiac Rhythm NSR.

## 2020-01-12 NOTE — PROGRESS NOTES
Pharmacy Automatic Renal Dosing Protocol - Antimicrobials    Indication for Antimicrobials: aspiration PNA     Current Regimen of Each Antimicrobial:  Zosyn 3.375g IV every 8 (Start Date 1/10; Day # 3)    Previous Antimicrobial Therapy:    Significant Cultures:   : urine - <10,000 col/mL - final  : blood - NGTD - pending    Radiology / Imaging results: (X-ray, CT scan or MRI):   : CT head: no hemorrhage or infarct  : CT abdomen/pelvis: IMPRESSION:Fecal impaction. Bladder distention. Mild prominence of the collecting system on the right. : chest xray:Mild interstitial prominence, bibasilar atelectasis or scar left greater than  right with left perihilar atelectasis or developing infiltrate. Correlate for  developing mild interstitial edema versus inflammatory infiltrate. .      Paralysis, amputations, malnutrition: none    Labs:  Recent Labs     20  0515 20  0636 01/10/20  0550 01/10/20  0414   CREA 0.52* 0.59  --  0.71   BUN 9 19  --  15   WBC  --  10.6 15.8*  --      Temp (24hrs), Av.3 °F (36.8 °C), Min:97.5 °F (36.4 °C), Max:99 °F (37.2 °C)    Creatinine Clearance (mL/min) or Dialysis: ~54    Impression/Plan:   Continue zosyn as above  Antimicrobial stop date to be determined     Pharmacy will follow daily and adjust medications as appropriate for renal function and/or serum levels. Thank you,  CHAZ Chowdary    Recommended duration of therapy  [email or web address]/[place]/[facility]/Pharmacy/Clinical%20Companion/[email or web address]%20ABX%20therapy. docx    Renal Dosing  [email or web address]/[place]/[facility]/Pharmacy/Clinical%20Companion/Renal%[email or web address]. pdf

## 2020-01-13 NOTE — PROGRESS NOTES
JASEN: SNF  1) [facility]- referral sent   2) insurance Laymon Roch will need to be obtained prior to d/c    3:00pm  CM spoke to patient's daughter Zena Tyson who reported that she would like patient to go to [facility]. FOC signed and on bedside chart. Referral sent to [facility] and waiting for response. Reason for Admission: PNA                     RRAT Score: 8                    Plan for utilizing home health: patient will need rehab upon d/c prior to returning home                         Current Advanced Directive/Advance Care Plan: Patient's daughter does not think that patient has a ACP                          Transition of Care Plan:                      Patient is a 79 y/o female who was admitted to [address] [place] [address] on 01/09/2020 for PNA. CM made room visit with patient who was asleep with daughter, Abe Chapa, who lives in [place], at bedside. Per dtr her sister, Alejandra Glover, who is currently at home sleeping as she stayed with patient over night, lives close to patient and is patient's POA/MPOA. Patient's daughter confirmed demographics, insurance, and emergency contact on file. Patient and son reside in a single level home with 4 HERMILA. Per daughter, patient's son is \"below average\" from a mentality standpoint and is not a hands on person. Patient's daughter Znea Tyson assists patient when needed and visits often to check in on patient. Patient has a cane and rollator at home. Patient uses the cane to assist with ambulation and rarely uses the rollator. Daughter Zena Tyson has been working on getting patient to use rollator more often but patient does not want to be seen in public using a rollator. Patient has refused to use a raised toilet seat or shower chair. Per daughter Gerardo Schultz, patient has had a gradual decline, mentally and physically, since the end of October when she had her first ED visit for UTI/PNA problems.  It was also mentioned that there was a second ED visit sometime in November for the same problems. A few days prior to this admission patient was incapable of doing anything for herself including not being able to bare weight or pivot. Patient has no history of HH, SNF, or IPR. CM spoke to daughter Brandon Hannah regarding recommendations for SNF at d/c. Per Brandon Hannah she is agreeable to rehab upon d/c but daughter Donovan Phipps is still hesitant. CM provided SNF list to Brandon Hannah who will discuss with Kerline. CM encouraged a choice be determined as soon as possible as patient will need insurance auth prior to d/c. CM to follow up later today. Care Management Interventions  PCP Verified by CM:  Yes  Mode of Transport at Discharge: BLS  Transition of Care Consult (CM Consult): Discharge Planning, SNF  Discharge Durable Medical Equipment: No  Physical Therapy Consult: Yes  Occupational Therapy Consult: Yes  Speech Therapy Consult: No  Current Support Network: Own Home, Family Lives Nearby  Confirm Follow Up Transport: Family  Discharge Location  Discharge Placement: Skilled nursing facility    Lily Daley, [address]

## 2020-01-13 NOTE — PROGRESS NOTES
[address] MRN [id number]  Consult ID [id number]  Facility Time Zone ET  Date and Time of Request 01/13/2020 04:28:43 AM  Requesting [address]  Patient Name Jennifer Chapa  Date of Birth [id number]  Gender Female  Clinical Note  Clinical Note PEr RN,Pt is experiencing some anxiousness from loss of independence and  disorientation/confusion. Patient is oriented to self only at this point  IV Ativan 0.5 mg once.

## 2020-01-13 NOTE — PROGRESS NOTES
Bedside shift change report given to Álavro (oncoming nurse) by Stas Murdock (offgoing nurse). Report included the following information SBAR, Kardex, Intake/Output and MAR.

## 2020-01-13 NOTE — PROGRESS NOTES
Bedside shift change report given to Kevin Smith RN (oncoming nurse) by Bruce Morel RN (offgoing nurse). Report included the following information SBAR, Kardex, Procedure Summary, Intake/Output, MAR, Accordion, Recent Results, Med Rec Status and Cardiac Rhythm NSR.

## 2020-01-13 NOTE — PROGRESS NOTES
Problem: Dysphagia (Adult)  Goal: *Acute Goals and Plan of Care (Insert Text)  Description  1/10/2020  Speech path goals  1. Pt will tolerate dys 1/purees and thins with no overt s/s of aspiration. MET  2. Pt will tolerate diet upgrade to dys 2/3 with thins with no overt s/s of aspiration. MET   Outcome: Resolved/Met     SPEECH LANGUAGE PATHOLOGY DYSPHAGIA TREATMENT/DISCHARGE  Patient: Eli Barros (17 y.o. female)  Date: 1/13/2020  Diagnosis: PNA (pneumonia) [J18.9]   <principal problem not specified>       Precautions:       ASSESSMENT:  Note diet was advanced to regular over the weekend. Patient only agreeable to minimal PO intake this date, however patient with good tolerance of solid and thin liquid with no overt s/s aspiration observed. Daughter at bedside reported that intake has improved with diet liberalization. PLAN:  --Continue regular/thin liquid diet, straws ok, meds as tolerated  Patient will be discharged from acute skilled speech therapy at this time. Rationale for discharge:  Goals achieved    Discharge Recommendations:  None     SUBJECTIVE:   Patient stated That's enough.     OBJECTIVE:   Cognitive and Communication Status:  Neurologic State: Alert, Confused  Orientation Level: Oriented to person  Cognition: Decreased attention/concentration              Safety/Judgement: Decreased awareness of environment, Decreased awareness of need for safety, Decreased insight into deficits  Dysphagia Treatment:     P.O. Trials:  Patient Position: upright in bed  Vocal quality prior to P.O.: No impairment  Consistency Presented: Thin liquid; Solid  How Presented: Self-fed/presented;Cup/sip;Cup/gulp;Straw     Bolus Acceptance: No impairment  Bolus Formation/Control: No impairment     Propulsion: No impairment  Oral Residue: None  Initiation of Swallow: No impairment  Laryngeal Elevation: Functional  Aspiration Signs/Symptoms: None  Pharyngeal Phase Characteristics: No impairment, issues, or problems                         NOMS:   The NOMS functional outcome measure was used to quantify this patient's level of swallowing impairment. Based on the NOMS, the patient was determined to be at level 6 for swallow function     NOMS Swallowing Levels:  Level 1 (CN): NPO  Level 2 (CM): NPO but takes consistency in therapy  Level 3 (CL): Takes less than 50% of nutrition p.o. and continues with nonoral feedings; and/or safe with mod cues; and/or max diet restriction  Level 4 (CK): Safe swallow but needs mod cues; and/or mod diet restriction; and/or still requires some nonoral feeding/supplements  Level 5 (CJ): Safe swallow with min diet restriction; and/or needs min cues  Level 6 (CI): Independent with p.o.; rare cues; usually self cues; may need to avoid some foods or needs extra time  Level 7 ([address] [place] [address]): Independent for all p.o.  DANK. (2003). National Outcomes Measurement System (NOMS): Adult Speech-Language Pathology User's Guide. Pain:  Pain Scale 1: FLACC  Pain Intensity 1: 0       After treatment:   Patient left in no apparent distress in bed, Call bell within reach, Nursing notified and Caregiver / family present    COMMUNICATION/EDUCATION:   The patient's plan of care including recommendations, planned interventions, and recommended diet changes were discussed with: Registered Nurse.     JIL Stein  Time Calculation: 10 mins

## 2020-01-13 NOTE — CONSULTS
Palliative Medicine Consult  Morales: [phone] ([phone])    Patient Name: Mary Osorio  YOB: 1924    Date of Initial Consult: 1/11/2020  Reason for Consult: Care Decisions  Requesting Provider: Asia Han MD  Primary Care Physician: Cassie Can NP     SUMMARY:   Mary Osorio is a 80 y. o. with a past history of HTN, and osteoporosis, who was admitted on 1/9/2020 from home with a diagnosis of CAP vs Aspiration PNA with UTI causing generalized weakness an dinability to walk. Current medical issues leading to Palliative Medicine involvement include: goals of care and dnr discussion    Social:  Ms Guy Munoz is a very independent woman who has 3 childrne. She lives with her son Rimma Horowitz who has some mental deficiencies. She has had a dramatic decline since October, but prior to that was completely independent     PALLIATIVE DIAGNOSES:   1. DNR Discussion  2. Advanced Care Planning  3. Frailty  4. Fatigue       PLAN:   1. Met with patient and her daughter Siir Hodge at the request of her family. Siri Hodge shared that she is familiar with Palliative Medicine from her work as a  for inmates in [facility]  2. Reed Morales was not present for these discussions as she had stayed the night with their mom, but will be here in the morning. I plan to come back in the morning to meet iw her  3. Siri Hodge shared that she, Reed Andrew, and their brother Rimma Horowitz all agree that Ms Guy Munoz should be a DNR at this stage of her life, and should complete and AMD listing Reed Morales as her mPOA. Ms Guy Munoz has been resistent to talking about this up till now, but Siri Hodge is hopeful that we will make some progress with her  4. Unfortunatly, Ms William Live memory is not good, and this hospitalization it has gotten worse. She has some lucid moments, but generally is not able to have complex conversations such as this  5.  Even so, Siri Naveen is hopeful that I will catch her in a more lucid time tomorrow  6. Either way- I let her know that I can talk with Bess Oliva tomorrow, and if Ms Jeremias High is still unable to have clear conversation with me, her children can make this decision on her behalf. 7. I was able to clear up some misconceptions that [facility] had about mPOA, DDNR, etc  8. Will come back tomorrow morning to attempt to talk to patient, or just have Ellen sign the DDNR prior to her discharge  9. Initial consult note routed to primary continuity provider and/or primary health care team members  10. Communicated plan of care with: Palliative IDT, [facility] Team     GOALS OF CARE / TREATMENT PREFERENCES:     GOALS OF CARE:  Patient/Health Care Proxy Stated Goals: Rehabilitation    TREATMENT PREFERENCES:   Code Status: DNR    Advance Care Planning:  [] The [facility] Interdisciplinary Team has updated the ACP Navigator with Health Care Decision Maker and Patient Capacity      Primary Decision Maker (Active): Ramiro - Daughter - [phone]    Primary Decision Maker: Jasson Lynn - Daughter    Primary Decision Maker: Debi Araujo - Son  Advance Care Planning 1/10/2020   Patient's Healthcare Decision Maker is: Legal Next of Kin   Confirm Advance Directive None   Patient Would Like to Complete Advance Directive No       Medical Interventions: Other (comment)(Family support DNR, but want us to talk to patietn if able)     Other Instructions: Other:    As far as possible, the palliative care team has discussed with patient / health care proxy about goals of care / treatment preferences for patient.      HISTORY:     History obtained from: chart, attending, daughter    Efren Gotti: none- patient slept through my entire visit    HPI/SUBJECTIVE:    The patient is:   [] Verbal and participatory  [x] Non-participatory due to:       Clinical Pain Assessment (nonverbal scale for severity on nonverbal patients):   Clinical Pain Assessment  Severity: 0 Duration: for how long has pt been experiencing pain (e.g., 2 days, 1 month, years)  Frequency: how often pain is an issue (e.g., several times per day, once every few days, constant)     FUNCTIONAL ASSESSMENT:     Palliative Performance Scale (PPS):  PPS: 30       PSYCHOSOCIAL/SPIRITUAL SCREENING:     Palliative IDT has assessed this patient for cultural preferences / practices and a referral made as appropriate to needs (Cultural Services, Patient Advocacy, Ethics, etc.)    Any spiritual / Restorationist concerns:  [] Yes /  [x] No    Caregiver Burnout:  [] Yes /  [x] No /  [] No Caregiver Present      Anticipatory grief assessment:   [x] Normal  / [] Maladaptive       ESAS Anxiety: Anxiety: 0    ESAS Depression:          REVIEW OF SYSTEMS:     Positive and pertinent negative findings in ROS are noted above in HPI. The following systems were [x] reviewed / [] unable to be reviewed as noted in HPI  Other findings are noted below. Systems: constitutional, ears/nose/mouth/throat, respiratory, gastrointestinal, genitourinary, musculoskeletal, integumentary, neurologic, psychiatric, endocrine. Positive findings noted below. Modified ESAS Completed by: provider   Fatigue: 8       Pain: 0   Anxiety: 0 Nausea: 0     Dyspnea: 0           Stool Occurrence(s): 1        PHYSICAL EXAM:     From RN flowsheet:  Wt Readings from Last 3 Encounters:   01/09/20 120 lb (54.4 kg)   11/08/19 129 lb 8 oz (58.7 kg)   10/29/19 125 lb (56.7 kg)     Blood pressure 168/79, pulse 88, temperature 98.6 °F (37 °C), resp. rate 16, height 5' 3\" (1.6 m), weight 120 lb (54.4 kg), SpO2 93 %, unknown if currently breastfeeding.     Pain Scale 1: Numeric (0 - 10)  Pain Intensity 1: 0  Pain Onset 1: chronic   Pain Location 1: Neck, Back  Pain Orientation 1: Upper  Pain Description 1: Aching  Pain Intervention(s) 1: Medication (see MAR)  Last bowel movement, if known:     Constitutional: well nourished, sleeping comfortably  ENMT: no nasal discharge, moist mucous membranes  Cardiovascular: regular rhythm, distal pulses intact  Respiratory: breathing not labored, symmetric  Gastrointestinal: soft non-tender, +bowel sounds  Musculoskeletal: no deformity, no tenderness to palpation  Skin: warm, dry  Other:       HISTORY:     Active Problems:    Hypercholesteremia (2010)      PNA (pneumonia) (2020)      Hypertensive urgency (2020)      A-fib ([address] (2020)      Inability to walk (2020)      Past Medical History:   Diagnosis Date    Disc disease, degenerative, lumbar or lumbosacral     HTN (hypertension) 2010    Hypercholesteremia 2010    Osteoporosis, age related       Past Surgical History:   Procedure Laterality Date    ABDOMEN SURGERY PROC UNLISTED      ENDOSCOPY, COLON, DIAGNOSTIC      HX CATARACT REMOVAL      HX GYN        Family History   Problem Relation Age of Onset    Heart Disease Mother     Cancer Father         colon    No Known Problems Sister     No Known Problems Brother       History reviewed, no pertinent family history.   Social History     Tobacco Use    Smoking status: Former Smoker     Last attempt to quit: 5/3/1986     Years since quittin.7    Smokeless tobacco: Never Used   Substance Use Topics    Alcohol use: Yes     Comment: wine occas     No Known Allergies   Current Facility-Administered Medications   Medication Dose Route Frequency    [START ON 2020] senna-docusate (PERICOLACE) 8.6-50 mg per tablet 1 Tab  1 Tab Oral QHS    levoFLOXacin (LEVAQUIN) tablet 750 mg  750 mg Oral Q24H    dilTIAZem CD (CARDIZEM CD) capsule 180 mg  180 mg Oral DAILY    balsam peru-castor oil (VENELEX) ointment   Topical Q6H    nitroglycerin (NITROBID) 2 % ointment 1 Inch  1 Inch Topical Q6H PRN    sodium chloride (NS) flush 5-40 mL  5-40 mL IntraVENous Q8H    sodium chloride (NS) flush 5-40 mL  5-40 mL IntraVENous PRN    acetaminophen (TYLENOL) tablet 650 mg  650 mg Oral Q6H PRN    ondansetron (ZOFRAN) injection 4 mg  4 mg IntraVENous Q4H PRN    heparin (porcine) injection 5,000 Units  5,000 Units SubCUTAneous Q12H          LAB AND IMAGING FINDINGS:     Lab Results   Component Value Date/Time    WBC 10.6 01/11/2020 06:36 AM    HGB 10.2 (L) 01/11/2020 06:36 AM    PLATELET 530 36/91/0701 06:36 AM     Lab Results   Component Value Date/Time    Sodium 139 01/13/2020 05:14 AM    Potassium 3.0 (L) 01/13/2020 05:14 AM    Chloride 105 01/13/2020 05:14 AM    CO2 26 01/13/2020 05:14 AM    BUN 7 01/13/2020 05:14 AM    Creatinine 0.50 (L) 01/13/2020 05:14 AM    Calcium 8.4 (L) 01/13/2020 05:14 AM    Magnesium 2.4 01/11/2020 06:36 AM      Lab Results   Component Value Date/Time    AST (SGOT) 26 01/10/2020 04:14 AM    Alk. phosphatase 87 01/10/2020 04:14 AM    Protein, total 7.7 01/10/2020 04:14 AM    Albumin 2.9 (L) 01/10/2020 04:14 AM    Globulin 4.8 (H) 01/10/2020 04:14 AM     No results found for: INR, PTMR, PTP, PT1, PT2, APTT, INREXT, INREXT   No results found for: IRON, FE, TIBC, IBCT, PSAT, FERR   No results found for: PH, PCO2, PO2  No components found for: GLPOC   No results found for: CPK, CKMB             Total time:   Counseling / coordination time, spent as noted above:   > 50% counseling / coordination?:     Prolonged service was provided for  []30 min   []75 min in face to face time in the presence of the patient, spent as noted above. Time Start:   Time End:   Note: this can only be billed with 59120 (initial) or 43795 (follow up). If multiple start / stop times, list each separately.

## 2020-01-13 NOTE — PROGRESS NOTES
Left heel noted with a red/purple area. Dr. Ricco Chin notified and order noted for veneflex ointment every 6 hours and heel boot. Wound consult initiated. Daughter at bedside and notified of the area to the left heel.

## 2020-01-13 NOTE — PROGRESS NOTES
Hospitalist Progress Note    NAME: Jony Whitman   :  7/3/1924   MRN:  [id number]     Interim Hospital Summary: 80 y.o. female whom presented on 2020 with      Assessment / Plan:    CAP vs aspiration PNA  UTI  -CXR Mild interstitial prominence, bibasilar atelectasis or scar left greater than right with left perihilar atelectasis or developing infiltrate. Correlate for developing mild interstitial edema versus inflammatory infiltrate  -UCx NGTD  -completed 4 days of zosyn and zithromax. Will switch to levaquin to finish course  -off oxygen    Dehydration from diminished intake  Constipation / fecal impaction  Bladder distention  -CT abdomen Fecal impaction. Bladder distention. Mild prominence of the collecting system on the right  -Suspect fecal impaction was related to bladder distention, dehydration and decrease mobility  -repeat KUB okay. Stop miralax but start daily pericolace   -leave james. [place] in a week at SNF    PAF with RVR  HTN  -Echo EF 65%, mod MR, no AS. TSH wnl  -converted to NSR overnight. Switch to cardizem CD 180mg daily. -follow BP, if needed, can add ARB or ACEi    Acute metabolic encephalopathy in setting of dementia  Generalized weakness  -CT head nothing acute  -PT OT eval and treat. DC planning to SNF    18.5 - 24.9 Normal weight / Body mass index is 21.26 kg/m². Code status: DNR  Prophylaxis: Hep SQ  Recommended Disposition: SNF/LTC. Lives with son. She has had a gradual decline over the last few months. Subjective:     Chief Complaint / Reason for Physician Visit  Follow up of UTI, PNA, dehydration, Afib RVR, fecal impaction, Weakness  Chart reviewed in detail. Discussed with RN events overnight.      Review of Systems:  Symptom Y/N Comments  Symptom Y/N Comments   Fever/Chills    Chest Pain     Poor Appetite    Edema     Cough    Abdominal Pain     Sputum    Joint Pain     SOB/[place]    Pruritis/Rash Nausea/vomit    Tolerating PT/OT     Diarrhea    Tolerating Diet     Constipation    Other       Could NOT obtain due to:      PO intake:   No data found. Objective:     VITALS:   Last 24hrs VS reviewed since prior progress note. Most recent are:  Patient Vitals for the past 24 hrs:   Temp Pulse Resp BP SpO2   01/13/20 1116 97.2 °F (36.2 °C) 70 18 144/60 94 %   01/13/20 0756 98.7 °F (37.1 °C) 70 18 188/81 95 %   01/13/20 0449 97.8 °F (36.6 °C) 75 16 173/66 94 %   01/13/20 0047 97.9 °F (36.6 °C) 76 18 131/53 94 %   01/12/20 2038 97.9 °F (36.6 °C) 75 16 167/64 92 %   01/12/20 1459 97.8 °F (36.6 °C) 80 18 149/68 93 %       Intake/Output Summary (Last 24 hours) at 1/13/2020 1313  Last data filed at 1/13/2020 0424  Gross per 24 hour   Intake    Output 1750 ml   Net -1750 ml        PHYSICAL EXAM:  General: WD, WN. Alert, cooperative, no acute distress    EENT:  EOMI. Anicteric sclerae. MMM  Resp:  Coarse BS. No accessory muscle use  CV:  Regular  rhythm,  No edema  GI:  Soft, Non distended, Non tender.  +Bowel sounds  Neurologic:  Arouses to voice/touch. Confused. Psych:   Unable to assess, sleepy   Skin:  No rashes. No jaundice    Reviewed most current lab test results and cultures  YES  Reviewed most current radiology test results   YES  Review and summation of old records today    NO  Reviewed patient's current orders and MAR    YES  PMH/ reviewed - no change compared to H&P  ________________________________________________________________________  Care Plan discussed with:    Comments   Patient     Family  x daughters   RN     Care Manager     Consultant                        Multidiciplinary team rounds were held today with , nursing, pharmacist and clinical coordinator. Patient's plan of care was discussed; medications were reviewed and discharge planning was addressed.      ________________________________________________________________________  Total NON critical care TIME:  45 Minutes    Total CRITICAL CARE TIME Spent:   Minutes non procedure based      Comments   >50% of visit spent in counseling and coordination of care x     This includes time during multidisciplinary rounds if indicated above   ________________________________________________________________________  Cody Julien MD     Procedures: see electronic medical records for all procedures/Xrays and details which were not copied into this note but were reviewed prior to creation of Plan. LABS:  I reviewed today's most current labs and imaging studies.   Pertinent labs include:  Recent Labs     01/11/20  0636   WBC 10.6   HGB 10.2*   HCT 31.8*        Recent Labs     01/13/20  0514 01/12/20  0515 01/11/20  0636    139 138   K 3.0* 3.4* 3.6    107 107   CO2 26 28 25   * 115* 124*   BUN 7 9 19   CREA 0.50* 0.52* 0.59   CA 8.4* 8.3* 8.0*   MG  --   --  2.4

## 2020-01-13 NOTE — PROGRESS NOTES
Patient transfer to oncology in stable condition, report given to [facility].

## 2020-01-14 NOTE — PROGRESS NOTES
Palliative Medicine Consult Andrew: [phone]) Patient Name: Mary Osorio YOB: 1924 Date of Initial Consult: 1/11/2020 Reason for Consult: Care Decisions Requesting Provider: Asia Han MD 
Primary Care Physician: Cassie Can NP 
 
 SUMMARY:  
Mary Osorio is a 80 y. o. with a past history of HTN, and osteoporosis, who was admitted on 1/9/2020 from home with a diagnosis of CAP vs Aspiration PNA with UTI causing generalized weakness an dinability to walk. Current medical issues leading to Palliative Medicine involvement include: goals of care and dnr discussion Social:  Ms Guy Munoz is a very independent woman who has 3 childrne. She lives with her son Rimma Horowitz who has some mental deficiencies. She has had a dramatic decline since October, but prior to that was completely independent PALLIATIVE DIAGNOSES:  
1. DNR Discussion 2. Advanced Care Planning 3. Frailty 4. Fatigue PLAN:  
1. Met with patient and her daughter Reed Morales in follow up from my conversation with [facility] yesterday 2. I talked with Ms Guy Munoz for a long time about her wishes- she is clear that she wants to remain as independent as she can, and a life of dependence, in a bed, reliant on others for all her care, or unable to communicate with her family, is NOT something she would want 3. She elected to sign a DDNR to protect her wishes 4. Reed Moraels shared that she has a POA document that has a paragraph in it that names her as her moms mPOA_ unfortunately they do not have a hard copy anymore, only a picture of it on Ellen's phone 5. Encouraged her to keep that document on her phone for future ED visits, as without a copy on her chart here in the hospital- they will need to verify that on every admission 6.  Reed Morales reassured me that even if it had to be joint decision making between her and her siblings, they all share a common belief system and she said they are all relieved that her mom signed the DDNR 
7. Gave copies to RANJIT, and left original on the chart for transport 8. Initial consult note routed to primary continuity provider and/or primary health care team members 9. Communicated plan of care with: Palliative IDAlana [facility] Team 
 
 GOALS OF CARE / TREATMENT PREFERENCES:  
 
GOALS OF CARE: 
Patient/Health Care Proxy Stated Goals: Rehabilitation TREATMENT PREFERENCES:  
Code Status: DNR Advance Care Planning: 
[] The [facility] Interdisciplinary Team has updated the ACP Navigator with Camila and Patient Capacity Primary Decision MakerDaryle Given - Daughter - [phone] Advance Care Planning 1/10/2020 Patient's Healthcare Decision Maker is: Legal Next of Kin Confirm Advance Directive None Patient Would Like to Complete Advance Directive No  
 
 
Medical Interventions: Limited additional interventions Other Instructions: Other: As far as possible, the palliative care team has discussed with patient / health care proxy about goals of care / treatment preferences for patient. HISTORY:  
 
History obtained from: chart, attending, daughter CHIEF COMPLAINT: none- patient slept through my entire visit HPI/SUBJECTIVE: The patient is:  
[] Verbal and participatory [x] Non-participatory due to:  
 
 
Clinical Pain Assessment (nonverbal scale for severity on nonverbal patients):  
Clinical Pain Assessment Severity: 0 Duration: for how long has pt been experiencing pain (e.g., 2 days, 1 month, years) Frequency: how often pain is an issue (e.g., several times per day, once every few days, constant) FUNCTIONAL ASSESSMENT:  
 
Palliative Performance Scale (PPS): PPS: 40  PSYCHOSOCIAL/SPIRITUAL SCREENING:  
 
Palliative IDT has assessed this patient for cultural preferences / practices and a referral made as appropriate to needs [employer], Patient Advocacy, Ethics, etc.) Any spiritual / Mormonism concerns: 
[] Yes /  [x] No 
 
Caregiver Burnout: 
[] Yes /  [x] No /  [] No Caregiver Present Anticipatory grief assessment:  
[x] Normal  / [] Maladaptive ESAS Anxiety: Anxiety: 0 
 
ESAS Depression: Depression: 0 REVIEW OF SYSTEMS:  
 
Positive and pertinent negative findings in ROS are noted above in HPI. The following systems were [x] reviewed / [] unable to be reviewed as noted in HPI Other findings are noted below. Systems: constitutional, ears/nose/mouth/throat, respiratory, gastrointestinal, genitourinary, musculoskeletal, integumentary, neurologic, psychiatric, endocrine. Positive findings noted below. Modified ESAS Completed by: provider Fatigue: 6 Depression: 0 Pain: 0 Anxiety: 0 Nausea: 0 Anorexia: 0 Dyspnea: 0 Constipation: No  
  Stool Occurrence(s): 1 PHYSICAL EXAM:  
 
From RN flowsheet: 
Wt Readings from Last 3 Encounters:  
01/09/20 120 lb (54.4 kg) 11/08/19 129 lb 8 oz (58.7 kg) 10/29/19 125 lb (56.7 kg) Blood pressure 173/79, pulse 94, temperature 98.5 °F (36.9 °C), resp. rate 16, height 5' 3\" (1.6 m), weight 120 lb (54.4 kg), SpO2 94 %, unknown if currently breastfeeding. Pain Scale 1: Numeric (0 - 10) Pain Intensity 1: 0 Pain Onset 1: chronic Pain Location 1: Neck, Back Pain Orientation 1: Upper Pain Description 1: Aching Pain Intervention(s) 1: Medication (see MAR) Last bowel movement, if known:  
 
Constitutional: well nourished, sleeping comfortably ENMT: no nasal discharge, moist mucous membranes Cardiovascular: regular rhythm, distal pulses intact Respiratory: breathing not labored, symmetric Gastrointestinal: soft non-tender, +bowel sounds Musculoskeletal: no deformity, no tenderness to palpation Skin: warm, dry Other: 
 
 
 HISTORY:  
 
Active Problems: Hypercholesteremia (4/9/2010) PNA (pneumonia) (1/9/2020) Hypertensive urgency (1/9/2020) A-fib [address] [address] (2020) Inability to walk (2020) Past Medical History:  
Diagnosis Date  Disc disease, degenerative, lumbar or lumbosacral   
 HTN (hypertension) 2010  Hypercholesteremia 2010  Osteoporosis, age related Past Surgical History:  
Procedure Laterality Date  [address] UNLISTED  ENDOSCOPY, COLON, DIAGNOSTIC    
 HX CATARACT REMOVAL    
 HX GYN Family History Problem Relation Age of Onset  Heart Disease Mother  Cancer Father   
     colon  No Known Problems Sister  No Known Problems Brother History reviewed, no pertinent family history. Social History Tobacco Use  Smoking status: Former Smoker Last attempt to quit: 5/3/1986 Years since quittin.7  Smokeless tobacco: Never Used Substance Use Topics  Alcohol use: Yes Comment: wine occas No Known Allergies Current Facility-Administered Medications Medication Dose Route Frequency  lisinopril (PRINIVIL, ZESTRIL) tablet 20 mg  20 mg Oral DAILY  senna-docusate (PERICOLACE) 8.6-50 mg per tablet 1 Tab  1 Tab Oral QHS  dilTIAZem CD (CARDIZEM CD) capsule 180 mg  180 mg Oral DAILY  balsam peru-castor oil (VENELEX) ointment   Topical Q6H  
 nitroglycerin (NITROBID) 2 % ointment 1 Inch  1 Inch Topical Q6H PRN  
 sodium chloride (NS) flush 5-40 mL  5-40 mL IntraVENous Q8H  
 sodium chloride (NS) flush 5-40 mL  5-40 mL IntraVENous PRN  
 acetaminophen (TYLENOL) tablet 650 mg  650 mg Oral Q6H PRN  
 ondansetron (ZOFRAN) injection 4 mg  4 mg IntraVENous Q4H PRN  
 heparin (porcine) injection 5,000 Units  5,000 Units SubCUTAneous Q12H  
 
 
 
 LAB AND IMAGING FINDINGS:  
 
Lab Results Component Value Date/Time WBC 10.6 2020 06:36 AM  
 HGB 10.2 (L) 2020 06:36 AM  
 PLATELET 697  06:36 AM  
 
Lab Results Component Value Date/Time  Sodium 136 2020 02:51 AM  
 Potassium 4.0 2020 02:51 AM  
 Chloride 105 01/14/2020 02:51 AM  
 CO2 24 01/14/2020 02:51 AM  
 BUN 8 01/14/2020 02:51 AM  
 Creatinine 0.63 01/14/2020 02:51 AM  
 Calcium 8.8 01/14/2020 02:51 AM  
 Magnesium 2.4 01/11/2020 06:36 AM  
  
Lab Results Component Value Date/Time AST (SGOT) 26 01/10/2020 04:14 AM  
 Alk. phosphatase 87 01/10/2020 04:14 AM  
 Protein, total 7.7 01/10/2020 04:14 AM  
 Albumin 2.9 (L) 01/10/2020 04:14 AM  
 Globulin 4.8 (H) 01/10/2020 04:14 AM  
 
No results found for: INR, PTMR, PTP, PT1, PT2, APTT, INREXT, INREXT No results found for: IRON, FE, TIBC, IBCT, PSAT, FERR No results found for: PH, PCO2, PO2 No components found for: [place] No results found for: CPK, CKMB Total time:  
Counseling / coordination time, spent as noted above:  
> 50% counseling / coordination?:  
 
Prolonged service was provided for  []30 min   []75 min in face to face time in the presence of the patient, spent as noted above. Time Start:  
Time End:  
Note: this can only be billed with 37230 (initial) or 00426 (follow up). If multiple start / stop times, list each separately.

## 2020-01-14 NOTE — PROGRESS NOTES
Problem: Self Care Deficits Care Plan (Adult) Goal: *Acute Goals and Plan of Care (Insert Text) Description FUNCTIONAL STATUS PRIOR TO ADMISSION: Patient was independent without use of DME. Patient used a cane on occasion but refused to use a rollator. Patient able to perform ADLs. HOME SUPPORT PRIOR TO ADMISSION: The patient lived with her son who works night shift and has a daughter \"close by\". Occupational Therapy Goals Initiated 1/10/2020 1. Patient will perform grooming standing with minimal assistance/contact guard assist within 7 day(s). 2.  Patient will perform upper body dressing with minimal assistance/contact guard assist within 7 day(s). 3.  Patient will perform lower body dressing with minimal assistance/contact guard assist within 7 day(s). 4.  Patient will perform toilet/BSC transfers with minimal assistance/contact guard assist within 7 day(s). 5.  Patient will perform all aspects of toileting with minimal assistance/contact guard assist within 7 day(s). Outcome: Progressing Towards Goal 
 
OCCUPATIONAL THERAPY TREATMENT Patient: Ingrid Ladd (70 y.o. female) Date: 1/14/2020 Diagnosis: PNA (pneumonia) [J18.9] <principal problem not specified> Precautions:   
Chart, occupational therapy assessment, plan of care, and goals were reviewed. ASSESSMENT Patient continues with skilled OT services and is progressing towards goals. She requires multi-modal cues and additional time throughout all tasks. Her family is very supportive and involved, and is quick to assist her when she likely does not need assistance. Educated them on the importance of letting her at least attempt a task before they step in to assist.  Patient will continue to benefit from skilled OT treatment to maximize independence and safety in ADL. Continue to recommend SNF for rehab.  
    
 
PLAN : 
Patient continues to benefit from skilled intervention to address the above impairments. Continue treatment per established plan of care. to address goals. Recommendation for discharge: (in order for the patient to meet his/her long term goals) Therapy up to 5 days/week in SNF setting This discharge recommendation: 
Has been made in collaboration with the attending provider and/or case management IF patient discharges home will need the following DME: Defer to facility SUBJECTIVE:  
Patient stated Oh my God in heaven.  (when she looked at herself in the mirror) OBJECTIVE DATA SUMMARY:  
Cognitive/Behavioral Status: 
Neurologic State: Alert Orientation Level: Disoriented to place; Disoriented to situation;Disoriented to time;Oriented to person Cognition: Follows commands; Impaired decision making;Memory loss;Poor safety awareness Perception: Appears intact Safety/Judgement: Decreased awareness of environment;Decreased awareness of need for safety;Decreased awareness of need for assistance Functional Mobility and Transfers for ADLs: 
Bed Mobility: 
Supine to Sit: Moderate assistance Sit to Supine: Minimum assistance Scooting: Moderate assistance Transfers: 
Sit to Stand: Moderate assistance Functional Transfers Bathroom Mobility: Minimum assistance Toilet Transfer : Minimum assistance Cues: Verbal cues provided;Visual cues provided; Tactile cues provided Adaptive Equipment: Grab bars; [address] (comment) Bed to Chair: Moderate assistance;Minimum assistance(increasing to mo9d A as fatigue) Balance: 
Sitting: Intact Standing: Impaired; With support Standing - Static: Fair;Constant support Standing - Dynamic : Fair;Constant support ADL Intervention: 
  
 
Grooming Position Performed: Standing Washing Face: Stand-by assistance;Supervision Washing Hands: Stand-by assistance;Supervision Brushing Teeth: Stand-by assistance;Supervision Brushing/Combing Hair: Minimum assistance(for the back) Cues: Verbal cues provided;Visual cues provided; Tactile cues provided Upper Body Dressing Assistance Hospital Gown: Supervision(Additional time) Shirt simulation with hospital gown: Minimum  assistance;Proximal stability(Additional Time) Cues: Verbal cues provided;Visual cues provided; Tactile cues provided Lower Body Dressing Assistance Socks: Moderate assistance(for L; Supervision for R) Leg Crossed Method Used: Yes Position Performed: Seated edge of bed Cues: Verbal cues provided;Visual cues provided; Tactile cues provided Toileting Bowel Hygiene: Maximum assistance Clothing Management: Minimum assistance Adaptive Equipment: Grab bars; José Shi Cognitive Retraining Problem Solving: General alternative solution; Identifying the problem;Deductive reason Organizing/Sequencing: Breaking task down Attention to Task: Distractibility Following Commands: Follows one step commands/directions Safety/Judgement: Decreased awareness of environment;Decreased awareness of need for safety;Decreased awareness of need for assistance Cues: Verbal cues provided;Visual cues provided; Tactile cues provided Pain: No complaints Activity Tolerance:  
Fair Please refer to the flowsheet for vital signs taken during this treatment. After treatment patient left in no apparent distress:  
Supine in bed, Heels elevated for pressure relief, Call bell within reach, and Caregiver / family present COMMUNICATION/COLLABORATION:  
The patients plan of care was discussed with: Physical Therapist, Registered Nurse, and Care Manager Lester Moncada OTR/L Time Calculation: 32 mins

## 2020-01-14 NOTE — PROGRESS NOTES
JASEN:  
1) [place] 2) Waiting on [address] 3) Pt will need 2ND IM letter at time of discharge 4) Pt will need [facility] transportation at time of discharge 1:57 PM- Insurance authorization obtained: Augustin Alvarado number [id number]. JASEN note to follow. Medicare pt has received, reviewed, and signed 2nd IM letter informing them of their right to appeal the discharge. Signed copy has been placed on pt bedside chart. 12:34 PM- CM spoke with Elba Guo at [facility], they are still reviewing and requested [facility] transportation be pushed back to 2:00 PM. CM spoke with Edil Mcguire with [facility]; they can accept pt for 3:00 PM. CM will inform pt and RN. 11:17 AM- CM spoke with Fay Romero at [facility]; they are able accept pt today if pt gets authorization. CM arranging [facility] transportation for 1:00 PM and will cancel if needed. Family at bedside and aware. 11:06 AM- CM spoke with Elba Guo with [facility], they have requested updated PT notes and clinicals but anticipate they will accept pt. Elba Guo gave CM permission to arrange [facility] transportation. CM faxed directly to [phone].  
 
10:59 AM- D/C order noted. CM waiting on insurance authorization. CM contacted [employer] and am waiting on a return phone call from the . VALERI Moss, CM [facility] [phone]

## 2020-01-14 NOTE — PROGRESS NOTES
Pt leaving at 1300 via transportation to [facility]. PIV removed and applied dry dressing. Catherine stays in place until 1/20/20. Signed copy on bedside chart. Paperwork reviewed with patient and family. Multiple scripts sent with papers. Foam dressing changed on sacrum. Will follow up with NP at PCP office.

## 2020-01-14 NOTE — WOUND CARE
Wound Care consult: Chart reviewed and patient assessed for her left heel pressure injury that was not present on admission. Patient was transferred from the Orthopedic unit yesterday where he was admitted since 1-. Candance Huger Assessment: Pt. Is a 79 yo ambulatory female with normal sensation in her heels for her age (some deficit). The left heel has a small purple lesion that measures 1.2 x 1 x 0 and the skin is currently not open and not draining. This represents a DTI (Deep Tissue Injury) caused by pressure. Her Parker was 13 - 16 since admission. Treatment: Nursing started some Venelex today and Dr. Tavia Welch notified of the lesion. This is a hospital acquired pressure injury and orders are placed for her skin care of the heel and prevention was initiated immediately by the Oncology team as soon as it was noted by nursing during the dual skin assessment. Wound Heel Left Left heel purple DTI - wound care orders written  01/13/20 (Active)   Dressing Type Open to air 1/14/2020 11:22 AM   Pressure Injury Deep Tissue Injury 1/14/2020 11:22 AM   Shape oval 1/14/2020 11:22 AM   Wound Length (cm) 1.5 cm 1/14/2020 11:22 AM   Wound Width (cm) 1 cm 1/14/2020 11:22 AM   Wound Surface Area (cm^2) 1.5 cm^2 1/14/2020 11:22 AM   Condition of Base Purple 1/14/2020 11:22 AM   Condition of Edges Closed 1/14/2020 11:22 AM   Epithelialization (%) 0 1/14/2020 11:22 AM   Assessment Clean 1/14/2020 11:22 AM   Tissue Type Percent Maroon/Purple 100 % 1/14/2020 11:22 AM   Drainage Amount None 1/14/2020 11:22 AM   Wound Odor None 1/14/2020 11:22 AM   Mary-wound Assessment Intact 1/14/2020 11:22 AM   Dressing Type Applied Open to air 1/14/2020 11:22 AM   Procedure Tolerated Well 1/14/2020 11:22 AM   Plan: Continue the Venelex every 6 hours and off load the heel at all times. Family have been informed of the treatment plan. When the lesion opens, it will need the Venelex PLUS an absorbent foam dressing.    Trace Waller, RN, BSN, [employer]

## 2020-01-14 NOTE — PROGRESS NOTES
TRANSFER - IN REPORT:    Verbal report received from Ric(name) on Tommy Lynch  being received from Ortho(unit) for routine progression of care      Report consisted of patients Situation, Background, Assessment and   Recommendations(SBAR). Information from the following report(s) SBAR and Kardex was reviewed with the receiving nurse. Opportunity for questions and clarification was provided. Assessment completed upon patients arrival to unit and care assumed.

## 2020-01-14 NOTE — PROGRESS NOTES
Clinical Observation End of Shift Note      Bedside shift change report given to [facility], RN (incoming nurse) by Jenelle Kimble RN (outgoing nurse) on [facility]. Report included the following information SBAR and Kardex. Shift Summary:   Pt arrived late as a transfer from [facility]. Pt stable and family at bedside.  -called MD x2 about renewing cardiac monitoring. Issues for Physician to Address: Cardiac monitoring? Patient on Cardiac Monitoring?     [] Yes  [x] No    Rhythm:            Jenelle Kimble RN

## 2020-01-14 NOTE — DISCHARGE INSTRUCTIONS
HOSPITALIST DISCHARGE INSTRUCTIONS    NAME: Elisha Navarro   :  7/3/1924   MRN:  [id number]     Date/Time:  2020 8:35 AM    ADMIT DATE: 2020   DISCHARGE DATE: 2020     Attending Physician: Delon Victor MD    DISCHARGE DIAGNOSIS:  CAP  UTI  Afib    Medications: Per above medication reconciliation. Pain Management: per above medications    Recommended diet: Cardiac Diet    Recommended activity: PT/OT Eval and Treat    Wound care:   LEFT HEEL - Wound Care to be done every 6 hours:  Cleanse the skin with soap and water until it actually opens, and then with NS and gauze. Apply the Venelex ointment every 6 hours and allow it to absorb into the skin. Inspect the other heel every 6 hours as well and float both of them at all times while in the bed. Keep prevention in mind when patient is in a chair or the bed as well. Indwelling devices: Catherine catheter, voiding trial in a week - 20. Call doctor if she fails    Supplemental Oxygen: None    Required Lab work: Per SNF routine    Glucose management:  None    Code status: DNR        Outside physician follow up: Follow-up Information     Follow up With Specialties Details Why Jolie NICHOLSON [place] [address]  [phone]    Kimberly Cerda NP Nurse Practitioner In 2 weeks  [address] [facility] [address]  [phone]                 Skilled nursing facility/ SNF MD responsible for above on discharge. Information obtained by :  I understand that if any problems occur once I am at home I am to contact my physician. I understand and acknowledge receipt of the instructions indicated above.                                                                                                                                            Physician's or R.N.'s Signature Date/Time                                                                                                                                              Patient or Repres

## 2020-01-14 NOTE — DISCHARGE SUMMARY
Hospitalist Discharge Summary     Patient ID:  Brie Barrera  [id number]  30 y.o.  7/3/1924    PCP on record: Ron Collado NP    Admit date: 1/9/2020  Discharge date and time: 1/14/2020      DISCHARGE DIAGNOSIS:    CAP   UTI  Dehydration from diminished intake  Constipation / fecal impaction, resolved  Bladder distention / Urine retention  PAF with RVR  HTN  Acute metabolic encephalopathy in setting of dementia  Generalized weakness  Left heel pressure injury    CONSULTATIONS:  IP CONSULT TO PALLIATIVE CARE - PROVIDER    Excerpted HPI from H&P of Nessa Moralez MD:  CHIEF COMPLAINT: unable to get up     HISTORY OF PRESENT ILLNESS:     Sherry Mcfadden is a 80 y.o.  female who presents to ED as she was unable to get up. As per family, pt was feeling generalized weakness and unable to getup at home. In ED, pt noted to have abnormal CXR with Mild interstitial prominence, bibasilar atelectasis or scar left greater than right with left perihilar atelectasis or developing infiltrate. Correlate for developing mild interstitial edema versus inflammatory infiltrate. On tele pt noted to have brief episode of a.fib which resolved with IV metoprolol. She also noted to have ? UTI.     ______________________________________________________________________  DISCHARGE SUMMARY/HOSPITAL COURSE:  for full details see H&P, daily progress notes, labs, consult notes. CAP  UTI  -CXR Mild interstitial prominence, bibasilar atelectasis or scar left greater than right with left perihilar atelectasis or developing infiltrate. Correlate for developing mild interstitial edema versus inflammatory infiltrate  -UCx NGTD  -completed 4 days of zosyn and zithromax. Switched to levaquin to finish course  -no oxygen needs     Dehydration from diminished intake  Constipation / fecal impaction  Bladder distention  -CT abdomen Fecal impaction. Bladder distention.  Mild prominence of the collecting system on the right  -fecal impaction was related to bladder distention, dehydration and decrease mobility. Patient had several BMs after given miralax and repeat KUB looked okay. Will transition her to daily pericolace. -leave james. VT in a week at SNF     PAF with RVR  HTN  -Echo EF 65%, mod MR, no AS. TSH wnl  -converted to NSR 1/12/20. Switched to cardizem CD 180mg daily.     -added back lisinopril for BP. Continue to hold HCTZ and follow BP at SNF     Acute metabolic encephalopathy in setting of dementia  Generalized weakness  -CT head nothing acute  -PT OT eval and treat. DC planning to SNF    Left heel pressure injury  Wound care recs appreciated  LEFT HEEL - Wound Care to be done every 6 hours:  Cleanse the skin with soap and water until it actually opens, and then with NS and gauze. Apply the Venelex ointment every 6 hours and allow it to absorb into the skin. Inspect the other heel every 6 hours as well and float both of them at all times while in the bed. Keep prevention in mind when patient is in a chair or the bed as well.  _______________________________________________________________________  Patient seen and examined by me on discharge day. Pertinent Findings:  Gen:    Not in distress  Chest: Clear lungs  CVS:   Regular rhythm. No edema  Abd:  Soft, not distended, not tender  Neuro:  Alert, Oriented x 4, grossly non focal exam  _______________________________________________________________________  DISCHARGE MEDICATIONS:   Current Discharge Medication List      START taking these medications    Details   dilTIAZem CD (CARDIZEM CD) 180 mg ER capsule Take 1 Cap by mouth daily for 90 days. Qty: 30 Cap, Refills: 2      senna-docusate (PERICOLACE) 8.6-50 mg per tablet Take 1 Tab by mouth nightly for 60 days. Qty: 30 Tab, Refills: 1      pravastatin (PRAVACHOL) 20 mg tablet Take 1 Tab by mouth nightly for 60 days.   Qty: 30 Tab, Refills: 1      levoFLOXacin (LEVAQUIN) 750 mg tablet Take 1 Tab by mouth every twenty-four (24) hours for 2 days. Qty: 2 Tab, Refills: 0         CONTINUE these medications which have NOT CHANGED    Details   aspirin 81 mg chewable tablet Take 1 Tab by mouth daily. lisinopril (PRINIVIL, ZESTRIL) 20 mg tablet TAKE 1 TABLET BY MOUTH ONCE DAILY  Qty: 90 Tab, Refills: 3    Associated Diagnoses: Essential hypertension         STOP taking these medications       simvastatin (ZOCOR) 20 mg tablet Comments:   Reason for Stopping:         amLODIPine (NORVASC) 10 mg tablet Comments:   Reason for Stopping:         hydroCHLOROthiazide (HYDRODIURIL) 12.5 mg tablet Comments:   Reason for Stopping:               My Recommended Diet, Activity, Wound Care, and follow-up labs are listed in the patient's Discharge Insturctions which I have personally completed and reviewed.   Risk of deterioration: Low    Condition at Discharge:  Stable  _____________________________________________________________________    Disposition  SNF/LTC  ____________________________________________________________________    Care Plan discussed with:   Patient, Family, RN, Care Manager    ____________________________________________________________________    Code Status: DNR/DNI  ____________________________________________________________________      Condition at Discharge:  Stable  _____________________________________________________________________  Follow up with:   PCP : Sergey Rodriguez NP  Follow-up Information     Follow up With Specialties Details Why Jolie NICHOLSON [place] [id number]   [facility] [address] [id number] [phone]    Sergey Rodriguez NP Nurse Practitioner In 2 weeks  [address] [facility] [address]  [phone]                Total time in minutes spent coordinating this discharge (includes going over instructions, follow-up, prescriptions, and preparing report for sign off to her PCP) :  35 minutes    Signed:  Kodak Whitehead Dony Gil MD

## 2020-01-14 NOTE — PROGRESS NOTES
Problem: Mobility Impaired (Adult and Pediatric)  Goal: *Acute Goals and Plan of Care (Insert Text)  Description  FUNCTIONAL STATUS PRIOR TO ADMISSION: Patient was independent without use of DME. Patient used a cane on occasion but refused to use a rollator    HOME SUPPORT PRIOR TO ADMISSION: The patient lived with her son who works night shift and has a daughter \"close by\". Physical Therapy Goals  Initiated 1/10/2020  1. Patient will move from supine to sit and sit to supine  in bed with moderate assistance  within 7 day(s). 2.  Patient will transfer from bed to chair and chair to bed with moderate assistance  using the least restrictive device within 7 day(s). 3.  Patient will perform sit to stand with moderate assistance  within 7 day(s). 4.  Patient will ambulate with moderate assistance  for 50 feet with the least restrictive device within 7 day(s). 5.  Patient will sit unsupported on edge of bed x10 minutes with supervision within 7 days. Outcome: Progressing Towards Goal  PHYSICAL THERAPY TREATMENT  Patient: Mary sOorio (13 y.o. female)  Date: 1/14/2020  Diagnosis: PNA (pneumonia) [J18.9]   <principal problem not specified>       Precautions:    Chart, physical therapy assessment, plan of care and goals were reviewed. ASSESSMENT  Patient continues with skilled PT services and is progressing towards goals. Patient able to progress ambulation distance today and required decreased assistance for all functional mobility compared to previous visit. Patient continues to demonstrated decreased standing balance with unsteady gait ambulating with RW, cues for balance, RW management, and to increase step length. Increased assistance required as beltran int fatigued with mobility due to decreased strength. Patient will continue to benefit from skilled PT in order to improve strength, balance and independence with all functional mobility.  Recommend skilled nursing facility for rehab prior to discharge home. Current Level of Function Impacting Discharge (mobility/balance): mod A for bed mobility, min A x 2 for ambulation with RW    Other factors to consider for discharge: assistance needed with al mobility         PLAN :  Patient continues to benefit from skilled intervention to address the above impairments. Continue treatment per established plan of care. to address goals. Recommendation for discharge: (in order for the patient to meet his/her long term goals)  Therapy up to 5 days/week in SNF setting    This discharge recommendation:  A follow-up discussion with the attending provider and/or case management is planned    IF patient discharges home will need the following DME: to be determined (TBD)       SUBJECTIVE:   Patient stated I am sorry.     OBJECTIVE DATA SUMMARY:   Critical Behavior:  Neurologic State: Alert, Appropriate for age, Confused  Orientation Level: Disoriented to place, Disoriented to situation, Disoriented to time, Oriented to person  Cognition: Follows commands  Safety/Judgement: Decreased awareness of environment, Decreased awareness of need for safety, Decreased insight into deficits  Functional Mobility Training:  Bed Mobility:     Supine to Sit: Moderate assistance     Scooting: Moderate assistance        Transfers:  Sit to Stand: Moderate assistance  Stand to Sit: Moderate assistance        Bed to Chair: Moderate assistance;Minimum assistance(increasing to mo9d A as fatigue)                    Balance:  Sitting: Intact  Standing: Impaired; With support  Standing - Static: Fair;Constant support  Standing - Dynamic : Fair;Constant support  Ambulation/Gait Training:  Distance (ft): 30 Feet (ft)  Assistive Device: Walker, rolling;Gait belt  Ambulation - Level of Assistance: Assist x2;Minimal assistance        Gait Abnormalities: Shuffling gait; Path deviations;Trunk sway increased        Base of Support: Widened     Speed/Iva: Shuffled  Step Length: Left shortened;Right shortened                  Short slow, shuffling gait, LOB with turns and increased trunk sway with fatigue      Pain Rating:  No complaints of pain    Activity Tolerance:   Fair and requires frequent rest breaks  Please refer to the flowsheet for vital signs taken during this treatment.     After treatment patient left in no apparent distress:   Sitting in chair, Call bell within reach, and Caregiver / family present    COMMUNICATION/COLLABORATION:   The patients plan of care was discussed with: Registered Nurse    Flaco Hess, PT   Time Calculation: 17 mins

## 2020-01-14 NOTE — PROGRESS NOTES
[facility] to [address]  80 y.o.   female [address] [facility] [place]   Room: 1142/01    MRM 1 CLINICAL OBS  Unit Phone# :  [phone] 
MRM 1 CLINICAL OBS Jeremias Apple [address] Loc: [id number] SITUATION Admitted:  1/9/2020         Attending Provider:  Yonis Cerda MD    
 
Consultations:  IP CONSULT TO PALLIATIVE CARE - PROVIDER 
 
PCP:  Irlanda Odell NP   [phone] Treatment Team: Attending Provider: Yonis Cerda MD; Consulting Provider: Aamir Johnson DO; Utilization Review: Keturah Nelson RN; Consulting Provider: Dalton Gil MD; Care Manager: Carlos Flores; Care Manager: Rosalina Mendez Admitting Dx:  PNA (pneumonia) [J18.9] Principal Problem: <principal problem not specified> * No surgery found * of  
  
BY: * Surgery not found *             ON: * No surgery found * Code Status: DNR Advance Directives:  
Advance Care Planning 1/10/2020 Patient's Healthcare Decision Maker is: Legal Next of Kin Confirm Advance Directive None Patient Would Like to Complete Advance Directive No  
 (Send w/patient) No Doesnt Have Isolation:  There are currently no Active Isolations       MDRO: No current active infections Pain Medications given:  none    Last dose: none at  none Special Equipment needed: no  Type of equipment: 
  
  BACKGROUND Allergies: 
No Known Allergies Past Medical History:  
Diagnosis Date  Disc disease, degenerative, lumbar or lumbosacral   
 HTN (hypertension) 4/9/2010  Hypercholesteremia 4/9/2010  Osteoporosis, age related Past Surgical History:  
Procedure Laterality Date [address] UNLISTED  ENDOSCOPY, COLON, DIAGNOSTIC    
 HX CATARACT REMOVAL    
 HX GYN Medications Prior to Admission Medication Sig  
 lisinopril (PRINIVIL, ZESTRIL) 20 mg tablet TAKE 1 TABLET BY MOUTH ONCE DAILY  simvastatin (ZOCOR) 20 mg tablet TAKE 1 TABLET BY MOUTH ONCE DAILY IN THE EVENING  
 amLODIPine (NORVASC) 10 mg tablet TAKE 1 TABLET BY MOUTH ONCE DAILY  hydroCHLOROthiazide (HYDRODIURIL) 12.5 mg tablet TAKE 1 TABLET BY MOUTH ONCE DAILY Hard scripts included in transfer packet no Vaccinations:   
Immunization History Administered Date(s) Administered  Influenza High Dose Vaccine PF 10/14/2015, 10/04/2016, 09/12/2017, 09/22/2019  Influenza Vaccine 09/12/2013, 09/12/2014  Influenza Vaccine Split 10/12/2010, 10/15/2011 Readmission Risks:    Known Risks: fall The Charlson CoMorbitiy Index tool is an evidenced based tool that has more automatic generated information. The tool looks at many different items such as the age of the patient, how many times they were admitted in the last calendar year, current length of stay in the hospital and their diagnosis. All of these items are pulled automatically from information documented in the chart from various places and will generate a score that predicts whether a patient is at low (less than 13), medium (13-20) or high (21 or greater) risk of being readmitted. ASSESSMENT Temp: 98.5 °F (36.9 °C) (01/14/20 0730) Pulse (Heart Rate): 94 (01/14/20 0730) Resp Rate: 16 (01/14/20 0730)           BP: 173/79 (01/14/20 0730) O2 Sat (%): 94 % (01/14/20 0730) Weight: 54.4 kg (120 lb)    Height: 5' 3\" (160 cm) (01/09/20 1144) If above not within 1 hour of discharge: 
 
BP:_____  P:____  R:____ T:_____ O2 Sat: ___%  O2: ______ Active Orders Diet DIET REGULAR Orientation: disoriented Active Behaviors: None Active Lines/Drains:  (Peg Tube / Catherine / CL or S/L?): yes Urinary Status: Draining, Catherine     Last BM: Last Bowel Movement Date: 01/14/20 Skin Integrity: Other (comment)(left heel wound) Mobility: Slightly limited Weight Bearing Status: WBAT (Weight Bearing as Tolerated) Gait Training Assistive Device: Walker, rolling, Gait belt Ambulation - Level of Assistance: Assist x2, Minimal assistance Distance (ft): 30 Feet (ft) Lab Results Component Value Date/Time Glucose 118 (H) 01/14/2020 02:51 AM  
 HGB 10.2 (L) 01/11/2020 06:36 AM  
 HGB 12.3 01/10/2020 05:50 AM  
  
  RECOMMENDATION See After Visit Summary (AVS) for: · Discharge instructions · After [facility] · Special equipment needed (entered pre-discharge by Care Management) · Medication Reconciliation · Follow up Appointment(s) Report given/sent by:  Alicia Becker RN Verbal report given to: Sharon Valverde RN FAXED to:  yes Estimated discharge time:  1/14/2020 at 1300

## 2020-01-14 NOTE — PROGRESS NOTES
Transition of Care Plan to SNF/Rehab 
 
SNF/Rehab Transition: 
Patient has been accepted to [facility] and meets criteria for admission. Patient will transported by [facility] transportation and expected to leave at 3:00 PM  
 
Communication to Patient/Family: Met with patient and spoke with both of pt's dtrs at bedside and they are agreeable to the transition plan. Communication to SNF/Rehab: 
Bedside RN, David Manjarrez, has been notified to update the transition plan to the facility and call report (phone number [phone]). Discharge information has been updated on the AVS. Discharge instructions to be fax'd to facility at [facility] # [phone]). SNF/Rehab Transition: 
Patient to follow-up with Home Health:  No specific agency PCP/Specialist: See AVS  
 
 Ambulatory Care Management: NA Reviewed and confirmed with facility, they can manage the patient care needs for the following:  
 
Kait Marrufo with (X) only those applicable: 
 
Medication: 
[x]  Medications will be available at the facility []  IV Antibiotics [x]  Controlled Substance - hard copy to be sent with patient  
[]  Weekly Labs Documents: 
[x] Hard RX [x] MAR [x] Kardex [x] AVS 
[x]Transfer Summary 
[x]Discharge Equipment: 
[]  CPAP/BiPAP []  Wound Vacuum [x]  Catherine or Urinary Device 
[]  PICC/Central Line 
[]  Nebulizer 
[]  Ventilator Treatment: 
[]Isolation (for MRSA, VRE, etc.) []Surgical Drain Management []Tracheostomy Care 
[]Dressing Changes []Dialysis with transportation and chair time []PEG Care []Oxygen []Daily Weights for Heart Failure Dietary: 
[x]Any diet limitations []Tube Feedings []Total Parenteral Management (TPN) Eligible for Medicaid Long Term Services and Supports Yes: 
[] Eligible for medical assistance or will become eligible within 180 days and UAI completed. [] Provider/Patient and/or support system has requested screening. [] UAI copy provided to patient or responsible party [] UAI unavailable at discharge will send once processed to SNF provider. [] UAI unavailable at discharged mailed to patient No:  
[] Private pay and is not financially eligible for Medicaid within the next 180 days. [] Reside out-of-state. [] A residents of a state owned/operated facility that is licensed 
by [address] and [address] [facility] or [facility] 
[] Enrollment in [facility] hospice services 
[] [address] 
[x] Patient /Family declines to have screening completed or provide financial information for screening Financial Resources: 
Medicaid   
[] Initiated and application pending  
[] Full coverage Advanced Care Plan: 
[]Surrogate Decision Maker of Care 
[]POA [x]Communicated Code Status- DDNR Other VALERI Diaz, CM [facility] [phone]

## 2020-01-15 NOTE — PROGRESS NOTES
Transition of care outreach postponed for 14 days due to patient's discharge to SNF. Per EMR patient discharged to [address] [place] [address].

## 2020-01-27 NOTE — TELEPHONE ENCOUNTER
----- Message from Maddi Stuart sent at 1/27/2020  2:16 PM EST -----  Regarding: Damian Dennis - NP / telephone  General Message/Vendor Calls    Caller's first and last name: Chet Young from [address] at [facility]       Reason for call: would need to know the MD that would be following the Pt for [address] [employer] [facility]. An MD needs to be assigned for Medicare Purposes. Pt would need skilled nursing, PT and OT.  Pt will be discharged 1/27/20      Callback required yes/no and why:      Best contact number(s): [phone]      Details to clarify the request:      Maddi Stuart

## 2020-01-28 NOTE — TELEPHONE ENCOUNTER
Verified patient with two type of identifiers. Spoke with Home Health and informed to attach Dr. Stefania Dash to pt's orders.

## 2020-01-31 PROBLEM — A41.9 SEPSIS (HCC): Status: ACTIVE | Noted: 2020-01-01

## 2020-01-31 NOTE — HOSPICE
[facility]  Consult noted. Reviewing chart. Discussed pt with palliative NP Colletta Planas. Message left with gigi Olvera [phone]. Will follow up tomorrow. Thank you for the opportunity to care for this pt and family. Please contact hospice at [phone] with any questions or concerns.

## 2020-01-31 NOTE — CONSULTS
Palliative Medicine Consult Andrew: [phone] Patient Name: Hilaria Castellanos YOB: 1924 Date of Initial Consult: 1/31/2020 Reason for Consult: Care Decisions Requesting Provider: Sindy France MD 
Primary Care Physician: Lore Dubose NP 
 
 SUMMARY:  
Hilaria Castellanos is a 80 y. o. with a past history of HTN, osteoporosis and a0fib, who was admitted on 1/31/2020 from home with a diagnosis of a-fib with RVR, diarrhea, and abdominal pain. Current medical issues leading to Palliative Medicine involvement include: goals of care discussion in setting of frailty and acute decline Social:  Ms Nicanor Brunner is a very independent woman who has 3 children. She lives with her son Matt Vasquez who has some mental deficiencies, but her daughters Shashank Castro and Dejan Pantoja are very involved in her care. She has had a dramatic decline since October, but prior to that was completely independent. She was able to recover some of her strength at rehab this last month, but over the last week lost all of her strength from persistent diarrhea PALLIATIVE DIAGNOSES:  
1. Goals of Care 2. Frailty 3. Debility 4. Lethargy PLAN:  
1. Ms Nicanor Brunner and her daughters are known to me from her previous admission 2. I met with them in the ER per their request- they are interested in hospice support which we had talked about during her last admission but at that time she did not qualify 3. Given this dramatic decline she has had over the last week- it is clear that she is incredibly debilitated from her extreme dehydration from diarrhea x 7 days and her already frail state 4. Family would like to meet with [address] [address] hospice but also would like a list of hospice agencies to interview while she is here in the hospital 
5. Goals are clear- medical management to treat acute infection and dehydration, but hospice at discharge.   They are also aware that there is a lot that can happen between now and then, and are aware that Ms Jessa Plascencia may not do well (or she may recover nicely and get back home) 6. Will follow up on Monday if she is still here 7. Initial consult note routed to primary continuity provider and/or primary health care team members 8. Communicated plan of care with: Palliative Alana REES [facility] Team 
 
 GOALS OF CARE / TREATMENT PREFERENCES:  
 
GOALS OF CARE: 
Patient/Health Care Proxy Stated Goals: Comfort TREATMENT PREFERENCES:  
Code Status: DNR Advance Care Planning: 
[x] The [facility] Interdisciplinary Team has updated the ACP Navigator with Hubertn and Patient Capacity [facility] Renan Duran Daughter - [phone] Advance Care Planning 1/10/2020 Patient's Healthcare Decision Maker is: Legal Next of Kin Confirm Advance Directive None Patient Would Like to Complete Advance Directive No  
 
 
Medical Interventions: Limited additional interventions Other Instructions: Other: As far as possible, the palliative care team has discussed with patient / health care proxy about goals of care / treatment preferences for patient. HISTORY:  
 
History obtained from: family, chart CHIEF COMPLAINT: none-sleeping during my visit HPI/SUBJECTIVE: The patient is:  
[] Verbal and participatory [x] Non-participatory due to:  
 
Slept during entire visit Clinical Pain Assessment (nonverbal scale for severity on nonverbal patients):  
Clinical Pain Assessment Severity: 0 Activity (Movement): Lying quietly, normal position Duration: for how long has pt been experiencing pain (e.g., 2 days, 1 month, years) Frequency: how often pain is an issue (e.g., several times per day, once every few days, constant) FUNCTIONAL ASSESSMENT:  
 
Palliative Performance Scale (PPS): PPS: 30 
 
 
 PSYCHOSOCIAL/SPIRITUAL SCREENING:  
 
 Palliative IDT has assessed this patient for cultural preferences / practices and a referral made as appropriate to needs (Cultural Services, Patient Advocacy, Ethics, etc.) Any spiritual / Yazidism concerns: 
[] Yes /  [x] No 
 
Caregiver Burnout: 
[] Yes /  [x] No /  [] No Caregiver Present Anticipatory grief assessment:  
[x] Normal  / [] Maladaptive ESAS Anxiety: Anxiety: 0 
 
ESAS Depression: Depression: 0 REVIEW OF SYSTEMS:  
 
Positive and pertinent negative findings in ROS are noted above in HPI. The following systems were [x] reviewed / [] unable to be reviewed as noted in HPI Other findings are noted below. Systems: constitutional, ears/nose/mouth/throat, respiratory, gastrointestinal, genitourinary, musculoskeletal, integumentary, neurologic, psychiatric, endocrine. Positive findings noted below. Modified ESAS Completed by: provider Fatigue: 10    
Depression: 0 Pain: 0 Anxiety: 0 Nausea: 0 Anorexia: 8 Dyspnea: 0 Constipation: No  
  Stool Occurrence(s): 3 PHYSICAL EXAM:  
 
From RN flowsheet: 
Wt Readings from Last 3 Encounters:  
01/31/20 125 lb (56.7 kg) 01/09/20 120 lb (54.4 kg) 11/08/19 129 lb 8 oz (58.7 kg) Blood pressure 102/65, pulse (!) 145, temperature 97.8 °F (36.6 °C), resp. rate 22, height 5' 3\" (1.6 m), weight 125 lb (56.7 kg), SpO2 94 %, unknown if currently breastfeeding. Pain Scale 1: Numeric (0 - 10) Pain Intensity 1: 0 Last bowel movement, if known:  
 
Constitutional: sleeping, frail ENMT: no nasal discharge, moist mucous membranes Cardiovascular: regular rhythm, distal pulses intact Respiratory: breathing not labored, symmetric Musculoskeletal: no deformity, no tenderness to palpation Skin: warm, dry Other: 
 
 
 HISTORY:  
 
Active Problems: 
  Sepsis ([address] (1/31/2020) Past Medical History:  
Diagnosis Date  Disc disease, degenerative, lumbar or lumbosacral   
 HTN (hypertension) 4/9/2010  Hypercholesteremia 2010  Osteoporosis, age related Past Surgical History:  
Procedure Laterality Date [address] UNLISTED  ENDOSCOPY, COLON, DIAGNOSTIC    
 HX CATARACT REMOVAL    
 HX GYN Family History Problem Relation Age of Onset  Heart Disease Mother  Cancer Father   
     colon  No Known Problems Sister  No Known Problems Brother History reviewed, no pertinent family history. Social History Tobacco Use  Smoking status: Former Smoker Last attempt to quit: 5/3/1986 Years since quittin.7  Smokeless tobacco: Never Used Substance Use Topics  Alcohol use: Yes Comment: wine occas No Known Allergies Current Facility-Administered Medications Medication Dose Route Frequency  dilTIAZem (CARDIZEM) 100 mg in dextrose 5% (MBP/ADV) 100 mL infusion  0-15 mg/hr IntraVENous TITRATE  amiodarone (CORDARONE) 375 mg/250 mL D5W infusion  1 mg/min IntraVENous CONTINUOUS Followed by  
[address] [place] [address] Cyril amiodarone (CORDARONE) 375 mg/250 mL D5W infusion  0.5 mg/min IntraVENous CONTINUOUS  
 metroNIDAZOLE (FLAGYL) IVPB premix 500 mg  500 mg IntraVENous Q8H  
 [START ON 2020] aspirin chewable tablet 81 mg  81 mg Oral DAILY  melatonin tablet 4.5 mg  4.5 mg Oral QHS PRN  pravastatin (PRAVACHOL) tablet 20 mg  20 mg Oral QHS  vancomycin (FIRVANQ) 50 mg/mL oral solution 125 mg  125 mg Oral Q6H  
 0.9% sodium chloride infusion  75 mL/hr IntraVENous CONTINUOUS  
 acetaminophen (TYLENOL) tablet 650 mg  650 mg Oral Q4H PRN  
 metoprolol (LOPRESSOR) injection 2.5 mg  2.5 mg IntraVENous Q6H  
 
 
 
 LAB AND IMAGING FINDINGS:  
 
Lab Results Component Value Date/Time WBC 22.1 (H) 2020 10:01 AM  
 HGB 11.5 2020 10:01 AM  
 PLATELET 927  10:01 AM  
 
Lab Results Component Value Date/Time  Sodium 135 (L) 2020 10:01 AM  
 Potassium 2.9 (L) 2020 10:01 AM  
 Chloride 104 2020 10:01 AM  
 CO2 22 01/31/2020 10:01 AM  
 BUN 14 01/31/2020 10:01 AM  
 Creatinine 0.58 01/31/2020 10:01 AM  
 Calcium 7.2 (L) 01/31/2020 10:01 AM  
 Magnesium 1.9 01/31/2020 10:01 AM  
  
Lab Results Component Value Date/Time AST (SGOT) 9 (L) 01/31/2020 10:01 AM  
 Alk. phosphatase 66 01/31/2020 10:01 AM  
 Protein, total 5.1 (L) 01/31/2020 10:01 AM  
 Albumin 1.8 (L) 01/31/2020 10:01 AM  
 Globulin 3.3 01/31/2020 10:01 AM  
 
No results found for: INR, PTMR, PTP, PT1, PT2, APTT, INREXT, INREXT No results found for: IRON, FE, TIBC, IBCT, PSAT, FERR No results found for: PH, PCO2, PO2 No components found for: [place] No results found for: CPK, CKMB Total time:  
Counseling / coordination time, spent as noted above:  
> 50% counseling / coordination?:  
 
Prolonged service was provided for  []30 min   []75 min in face to face time in the presence of the patient, spent as noted above. Time Start:  
Time End:  
Note: this can only be billed with 08107 (initial) or 43093 (follow up). If multiple start / stop times, list each separately.

## 2020-01-31 NOTE — CONSULTS
CARDIOLOGY CONSULT Patient ID: 
Patient: Bunny Shearer  MRN: [id number] Age: 80 y.o.  : 7/3/1924 Date of  Admission: 2020  9:25 AM  
PCP:  Kike Ruano NP Assessment: 1. Paroxysmal atrial fibrillation with RVR. 2. Chronic aspirin therapy. 3. Abdominal pain and diarrhea. 4. Hypokalemia, likely due to diarrhea paired with poor intake. 5. Hypertension. 6. Hypercholesterolemia. 9. Very advanced age. 8. Dementia. 9. Poor nutrition and oral intake. Hypoalbuminemia. 10. DNR. Plan: 1. Agree with a rate control strategy. For now IV diltiazem, I added metoprolol 2.5 IV q6h. Discontinued lisinopril to make room for the metoprolol which may be increased if her BP allows this. Eventually to orals. 2. Avoid digoxin given her advanced age. 3. Continue amiodarone longterm. I think rhythm control via Afib suppression is tim for her. 4. Discussed anticoagulation options with her and her family. I would not push for full dosing in her given the overall falls/bleeding risk, but reduced dose anticoagulation instead of aspirin could be considered if desired. I am leaving the current strategy in place for now. 5. Agree with repleting the potassium. This will help out Suspect it will take a day to get enough BP reserve to consistently get metoprolol atop her diltiazem for better rate control. All questions answered for the patient and most importantly the family. [x]       High complexity decision making was performed in this patient at high risk for decompensation with multiple organ involvement. Bunny Shearer is a 80 y.o. female with a history of a recent hospital admission in early 2020. She was treated for urinary tract infection and community-acquired pneumonia at that time. Paroxysmal atrial fibrillation was seen but was very brief in its duration.   She was given intravenous beta-blocker and had no recurrence. During that stay, she had constipation and fecal impaction which resolved. This admission, she comes in feeling poorly and suffering from abdominal pain and diarrhea. She is in atrial fibrillation with rapid ventricular response. She cannot give a reliable history or review of systems due to advanced dementia. Two family members are present in the room. Already appears that she feels better than when she was brought to the hospital. 
 
Past Medical History:  
Diagnosis Date  Disc disease, degenerative, lumbar or lumbosacral   
 HTN (hypertension) 2010  Hypercholesteremia 2010  Osteoporosis, age related Past Surgical History:  
Procedure Laterality Date   [place] UNLISTED  ENDOSCOPY, COLON, DIAGNOSTIC    
 HX CATARACT REMOVAL    
 HX GYN Social History Tobacco Use  Smoking status: Former Smoker Last attempt to quit: 5/3/1986 Years since quittin.7  Smokeless tobacco: Never Used Substance Use Topics  Alcohol use: Yes Comment: wine occas Family History Problem Relation Age of Onset  Heart Disease Mother  Cancer Father   
     colon  No Known Problems Sister  No Known Problems Brother No Known Allergies Current Facility-Administered Medications Medication Dose Route Frequency  dilTIAZem (CARDIZEM) 100 mg in dextrose 5% (MBP/ADV) 100 mL infusion  0-15 mg/hr IntraVENous TITRATE  amiodarone (CORDARONE) 375 mg/250 mL D5W infusion  1 mg/min IntraVENous CONTINUOUS Followed by  
[facility] amiodarone (CORDARONE) 375 mg/250 mL D5W infusion  0.5 mg/min IntraVENous CONTINUOUS  
 metroNIDAZOLE (FLAGYL) IVPB premix 500 mg  500 mg IntraVENous Q8H  
 [START ON 2020] aspirin chewable tablet 81 mg  81 mg Oral DAILY  melatonin tablet 4.5 mg  4.5 mg Oral QHS PRN  pravastatin (PRAVACHOL) tablet 20 mg  20 mg Oral QHS  vancomycin (FIRVANQ) 50 mg/mL oral solution 125 mg  125 mg Oral Q6H  
 0.9% sodium chloride infusion  75 mL/hr IntraVENous CONTINUOUS  
 acetaminophen (TYLENOL) tablet 650 mg  650 mg Oral Q4H PRN  
 metoprolol (LOPRESSOR) injection 2.5 mg  2.5 mg IntraVENous Q6H Review of Symptoms:  She cannot give a full ROS due to dementia. General: +WEAKNESS, negative for fever, chills, sweats, weight loss Eyes: negative for blurred vision, eye pain, loss of vision, diplopia Ear Nose and Throat: negative for rhinorrhea, pharyngitis, otalgia, tinnitus, speech or swallowing difficulties Respiratory: negative for SOB, cough, sputum production, wheezing, [place], pleuritic pain  
Cardiology: negative for chest pain, palpitations, orthopnea, PND, edema, syncope Gastrointestinal: negative for abdominal pain, N/V, dysphagia, change in bowel habits, bleeding Genitourinary: negative for frequency, urgency, dysuria, hematuria, incontinence Muskuloskeletal : negative for arthralgia, myalgia Hematology: negative for easy bruising, bleeding, lymphadenopathy Dermatological: negative for rash, ulceration, mole change, new lesion Endocrine: negative for hot flashes or polydipsia Neurological: negative for headache, dizziness, focal weakness, paresthesia Psychological: negative for anxiety, depression, agitation Objective:  
  
Physical Exam: 
Temp (24hrs), Av.4 °F (37.4 °C), Min:97.8 °F (36.6 °C), Max:100.8 °F (38.2 °C) Patient Vitals for the past 8 hrs: 
 Pulse 20 1500 (!) 145  
20 1440 (!) 143  
20 1432 (!) 142  
20 1430 (!) 138  
20 1420 (!) 140  
20 1400 (!) 149  
20 1339 (!) 141  
20 1338 (!) 147  
20 1337 (!) 145  
20 1320 (!) 170  
20 1314 (!) 165  
20 1300 (!) 161  
20 1240 (!) 176  
20 1052 (!) 164  
20 0955 (!) 200  
20 0927 (!) 183 Patient Vitals for the past 8 hrs: 
 Resp  
20 1500 22 01/31/20 1440 26  
01/31/20 1432 24  
01/31/20 1430 25  
01/31/20 1420 25  
01/31/20 1400 16  
01/31/20 1339 26  
01/31/20 1338 23  
01/31/20 1337 25  
01/31/20 1320 21  
01/31/20 1314 21  
01/31/20 1300 25  
01/31/20 1240 21  
01/31/20 1052 21  
01/31/20 0927 24 Patient Vitals for the past 8 hrs: 
 BP  
01/31/20 1500 102/65  
01/31/20 1440 101/50  
01/31/20 1420 106/49  
01/31/20 1400 123/57  
01/31/20 1340 105/44  
01/31/20 1320 103/68  
01/31/20 1314 105/47  
01/31/20 1300 105/47  
01/31/20 1240 130/53  
01/31/20 1052 141/60  
01/31/20 0955 113/58  
01/31/20 0927 134/59 Intake/Output Summary (Last 24 hours) at 1/31/2020 1542 Last data filed at 1/31/2020 1354 Gross per 24 hour Intake 3151 ml Output  Net 3151 ml Nondiaphoretic, not in acute distress, but ill-appearing. Supple, no palpable thyromegaly. No scleral icterus, mucous membranes moist, conjuctivae pink, no xanthelasma. Unlabored, clear to auscultation bilaterally anteriorly, symmetric air movement. Irregular rate and rhythm, +subtle systolic murmur, no pericardial rub, knock, or gallop. No JVD or peripheral edema. Palpable radial pulses bilaterally. Abdomen, soft, nontender, nondistended. Extremities without cyanosis or clubbing. Muscle tone and bulk normal for age. Skin warm and dry. No rashes or ulcers. Neuro grossly nonfocal.  No tremor. Awake, oriented to self. CARDIOGRAPHICS and STUDIES, I reviewed: 
 
Telemetry: Atrial fibrillation with rapid ventricular response. ECG: Atrial fibrillation with rapid ventricular response. CXR:  Mild right lower lobe airspace disease as suggested by the radiologist. 
  
 
Labs: 
No results for input(s): CPK, CKMB, CKNDX, TROIQ in the last 72 hours. No lab exists for component: CPKMB Lab Results Component Value Date/Time  Cholesterol, total 172 05/15/2019 09:02 AM  
 HDL Cholesterol 81 05/15/2019 09:02 AM  
 LDL, calculated 74 05/15/2019 09:02 AM  
 Triglyceride 86 05/15/2019 09:02 AM  
 CHOL/HDL Ratio 2.4 10/12/2010 08:54 AM  
 
No results for input(s): INR, PTP, APTT, INREXT in the last 72 hours. Recent Labs  
  01/31/20 
1001 *  
K 2.9*  
 CO2 22 BUN 14  
CREA 0.58 * CA 7.2* ALB 1.8* WBC 22.1* HGB 11.5 HCT 34.3*  
 Recent Labs  
  01/31/20 
1001 SGOT 9* AP 66  
TP 5.1* ALB 1.8*  
GLOB 3.3 No components found for: [place] No results for input(s): PH, PCO2, PO2 in the last 72 hours. Gemma Vidal MD 
1/31/2020

## 2020-01-31 NOTE — PROGRESS NOTES
TRANSFER - IN REPORT: 
 
Verbal report received from Shirley Dancer, RN(name) on [facility]  being received from ED(unit) for routine progression of care Report consisted of patients Situation, Background, Assessment and  
Recommendations(SBAR). Information from the following report(s) SBAR, Kardex, ED Summary, Intake/Output, MAR, Recent Results, Cardiac Rhythm Afib and Quality Measures was reviewed with the receiving nurse. Opportunity for questions and clarification was provided. Assessment completed upon patients arrival to unit and care assumed. 1450: Pt arrived to PCU with 2 daughters at bedside. Pt alert to self only. Pt on amio and cardizem gtt. Dual skin assessment performed with Tayo Dent RN. Pt sacrum is red and blanchable, foam dressing in place for preventative measures. Pt left heel is red and not blanchable. Heels elevated on pillow. Will place wound consult. Pt is afebrile. VSS. MEWS Score 5 due to elevated HR.   
 
1520: Spoke with palliative care, family wants to talk with hospice care during this admission. 1530: Dr. Brendan Yoo at bedside. 1700: Pt eating dinner with family at bedside. HR remains in 130-140s. 
 
1800: Pt BP 94/56. Held metoprolol. Spoke with Dr. Brendan Yoo and informed him. 1900: Bedside and Verbal shift change report given to Jennifer Mera RN (oncoming nurse) by Artie You (offgoing nurse). Report included the following information SBAR, Kardex, ED Summary, Intake/Output, MAR, Recent Results, Cardiac Rhythm Afib and Quality Measures.

## 2020-01-31 NOTE — PROGRESS NOTES
Entered an ADT order (wasn't in), requested ASAP by nursing. Couldn't assign the admitting hospitalist as the attending (not in the system?), so in order to place orders for nursing, I listed myself as the attending. For clarification, I am not the attending, just the cardiology consultant on the case.

## 2020-01-31 NOTE — H&P
Hospitalist Admission Note NAME: Randie Gitelman :  7/3/1924 MRN:  [id number] Date/Time:  2020 1:10 PM 
 
Patient PCP: Majo Ivory NP 
________________________________________________________________________ My assessment of this patient's clinical condition and my plan of care is as follows. Assessment / Plan: A. fib with RVR in a patient with history of A. fib  The current episode is due to likely dehydration and pain as well as fever Patient is on amiodarone and Cardizem drip Continue current treatment as it is  Cardiology consulted by emergency room  Patient is DNR/DNI and does not need any anticoagulation at this time Diarrhea and abdominal pain Suspected C. difficile which  is severe AC if test came back positive patient has recent antibiotic exposure. White cell count is 22,000  Patient was started on oral vancomycin by home health company We will add metronidazole 500 mg 3 times daily IV  Follow C. difficile PCR IV hydration Hold oral Cardizem Continue aspirin and Lipitor Advanced dementia Monitor Body mass index is 22.14 kg/m².: 18.5 - 24.9 Normal weight Code Status: DNR/DNI discussed with children who were at bedside they are interested in hospice care and discussing with palliative care. Surrogate Decision Maker: Children DVT Prophylaxis: SCDs GI Prophylaxis: not indicated Subjective: CHIEF COMPLAINT: Palpitation chest pain and shortness of breath HISTORY OF PRESENT ILLNESS:    
Shaggy Barton is a 80 y.o.  female who presents with past medical history of A. fib, hypertension, dementia who was recently discharged from hospital after she was treated for pneumonia and sent to rehab. While in rehab patient developed diarrhea and was discharged home.   While at home she continues to have frequent diarrhea up to 5-6 times a day.  Today she complain of some chest tightness and pain radiating to her jaw. When patient was brought to the ED she was found to have tachycardia in the range of 180-200. She also had fever. [facility] send C. difficile test yesterday but there is no report and at the same time the home health company started the patient on vancomycin capsule. In the ED she was found to have elevated white blood cells 22,000. Has tachycardia. Patient started on Cardizem drip but her heart rate did not improve. ED physician contacted patient's primary cardiologist and started amiodarone in addition to Cardizem. Patient received a bolus of 2 L of crystalloid Patient to be admitted to the hospital for management of A. fib with RVR as well as suspected C. difficile diarrhea. Patient received vancomycin and cefepime but there is no other systemic infection at this point We were asked to admit for work up and evaluation of the above problems. Past Medical History:  
Diagnosis Date  Disc disease, degenerative, lumbar or lumbosacral   
 HTN (hypertension) 2010  Hypercholesteremia 2010  Osteoporosis, age related Past Surgical History:  
Procedure Laterality Date  [address] [place] [address] UNLISTED  ENDOSCOPY, COLON, DIAGNOSTIC    
 HX CATARACT REMOVAL    
 HX GYN Social History Tobacco Use  Smoking status: Former Smoker Last attempt to quit: 5/3/1986 Years since quittin.7  Smokeless tobacco: Never Used Substance Use Topics  Alcohol use: Yes Comment: wine occas Family History Problem Relation Age of Onset  Heart Disease Mother  Cancer Father   
     colon  No Known Problems Sister  No Known Problems Brother No Known Allergies Prior to Admission medications Medication Sig Start Date End Date Taking? Authorizing Provider  
escitalopram oxalate (LEXAPRO) 5 mg tablet Take 5 mg by mouth daily.    Yes Provider, Historical  
melatonin 5 mg tablet Take 5 mg by mouth nightly as needed (sleep distubrance). Yes Provider, Historical  
vancomycin (VANCOCIN) 125 mg capsule Take 125 mg by mouth four (4) times daily. 1/30/20 2/3/20 Yes Provider, Historical  
dilTIAZem CD (CARDIZEM CD) 180 mg ER capsule Take 1 Cap by mouth daily for 90 days. 1/14/20 4/13/20 Yes Ruba Diaz MD  
pravastatin (PRAVACHOL) 20 mg tablet Take 1 Tab by mouth nightly for 60 days. 1/14/20 3/14/20 Yes Ruba Diaz MD  
aspirin 81 mg chewable tablet Take 1 Tab by mouth daily. 1/14/20  Yes Ruba Diaz MD  
lisinopril (PRINIVIL, ZESTRIL) 20 mg tablet TAKE 1 TABLET BY MOUTH ONCE DAILY 4/5/19  Yes Vero Brooks NP  
 
 
REVIEW OF SYSTEMS:    
I am not able to complete the review of systems because: The patient is intubated and sedated The patient has altered mental status due to his acute medical problems The patient has baseline aphasia from prior stroke(s) The patient has baseline dementia and is not reliable historian The patient is in acute medical distress and unable to provide information Total of 12 systems reviewed as follows:   
   POSITIVE= underlined text  Negative = text not underlined General:  fever, chills, sweats, generalized weakness, weight loss/gain,  
   loss of appetite Eyes:    blurred vision, eye pain, loss of vision, double vision ENT:    rhinorrhea, pharyngitis Respiratory:   cough, sputum production, SOB, [place], wheezing, pleuritic pain  
Cardiology:   chest pain, palpitations, orthopnea, PND, edema, syncope Gastrointestinal:  abdominal pain , N/V, diarrhea, dysphagia, constipation, bleeding Genitourinary:  frequency, urgency, dysuria, hematuria, incontinence Muskuloskeletal :  arthralgia, myalgia, back pain Hematology:  easy bruising, nose or gum bleeding, lymphadenopathy Dermatological: rash, ulceration, pruritis, color change / jaundice Endocrine:   hot flashes or polydipsia Neurological:  headache, dizziness, confusion, focal weakness, paresthesia, Speech difficulties, memory loss, gait difficulty Psychological: Feelings of anxiety, depression, agitation Objective: VITALS:   
Visit Vitals /60 Pulse (!) 164 Temp 99.2 °F (37.3 °C) Resp 24 Ht 5' 3\" (1.6 m) Wt 56.7 kg (125 lb) SpO2 95% BMI 22.14 kg/m² PHYSICAL EXAM: 
 
 
_______________________________________________________________________ Care Plan discussed with: 
  Comments Patient Family RN Care Manager Consultant:     
_______________________________________________________________________ Expected  Disposition:  
Home with Family HH/PT/OT/RN   
SNF/LTC   
LIV   
________________________________________________________________________ TOTAL TIME:  77 Minutes Critical Care Provided     Minutes non procedure based Comments   Reviewed previous records  
>50% of visit spent in counseling and coordination of care  Discussion with patient and/or family and questions answered 
  
 
________________________________________________________________________ Signed: Elvin Miner MD 
 
Procedures: see electronic medical records for all procedures/Xrays and details which were not copied into this note but were reviewed prior to creation of Plan. LAB DATA REVIEWED:   
Recent Results (from the past 24 hour(s)) EKG, 12 LEAD, INITIAL Collection Time: 01/31/20  9:37 AM  
Result Value Ref Range Ventricular Rate 182 BPM  
 Atrial Rate 241 BPM  
 QRS Duration 68 ms Q-T Interval 260 ms QTC Calculation (Bezet) 452 ms Calculated R Axis 8 degrees Calculated T Axis -139 degrees Diagnosis Atrial fibrillation with rapid ventricular response Marked ST abnormality, possible inferior subendocardial injury When compared with ECG of 09-JAN-2020 17:19, 
Atrial fibrillation has replaced Sinus rhythm Vent. rate has increased BY  95 BPM 
ST now depressed in Inferior leads ST now depressed in Lateral leads T wave inversion now evident in Inferior leads T wave inversion now evident in Lateral leads CBC WITH AUTOMATED DIFF Collection Time: 01/31/20 10:01 AM  
Result Value Ref Range WBC 22.1 (H) 3.6 - 11.0 K/uL  
 RBC 4.05 3.80 - 5.20 M/uL  
 HGB 11.5 11.5 - 16.0 g/dL HCT 34.3 (L) 35.0 - 47.0 % MCV 84.7 80.0 - 99.0 FL  
 MCH 28.4 26.0 - 34.0 PG  
 MCHC 33.5 30.0 - 36.5 g/dL  
 RDW 15.3 (H) 11.5 - 14.5 % PLATELET 930 826 - 595 K/uL MPV 10.5 8.9 - 12.9 FL  
 NRBC 0.0 0  WBC ABSOLUTE NRBC 0.00 0.00 - 0.01 K/uL NEUTROPHILS 73 32 - 75 % BAND NEUTROPHILS 17 % LYMPHOCYTES 9 (L) 12 - 49 % MONOCYTES 1 (L) 5 - 13 % EOSINOPHILS 0 0 - 7 % BASOPHILS 0 0 - 1 % IMMATURE GRANULOCYTES 0 0.0 - 0.5 % ABS. NEUTROPHILS 19.9 (H) 1.8 - 8.0 K/UL  
 ABS. LYMPHOCYTES 2.0 0.8 - 3.5 K/UL  
 ABS. MONOCYTES 0.2 0.0 - 1.0 K/UL  
 ABS. EOSINOPHILS 0.0 0.0 - 0.4 K/UL  
 ABS. BASOPHILS 0.0 0.0 - 0.1 K/UL ABS. IMM. GRANS. 0.0 0.00 - 0.04 K/UL  
 DF MANUAL    
 RBC COMMENTS NORMOCYTIC, NORMOCHROMIC    
 WBC COMMENTS TOXIC GRANULATION    
METABOLIC PANEL, COMPREHENSIVE Collection Time: 01/31/20 10:01 AM  
Result Value Ref Range Sodium 135 (L) 136 - 145 mmol/L Potassium 2.9 (L) 3.5 - 5.1 mmol/L Chloride 104 97 - 108 mmol/L  
 CO2 22 21 - 32 mmol/L Anion gap 9 5 - 15 mmol/L Glucose 159 (H) 65 - 100 mg/dL BUN 14 6 - 20 MG/DL Creatinine 0.58 0.55 - 1.02 MG/DL  
 BUN/Creatinine ratio 24 (H) 12 - 20 GFR est AA >60 >60 ml/min/1.73m2 GFR est non-AA >60 >60 ml/min/1.73m2 Calcium 7.2 (L) 8.5 - 10.1 MG/DL Bilirubin, total 0.6 0.2 - 1.0 MG/DL  
 ALT (SGPT) 8 (L) 12 - 78 U/L  
 AST (SGOT) 9 (L) 15 - 37 U/L Alk. phosphatase 66 45 - 117 U/L Protein, total 5.1 (L) 6.4 - 8.2 g/dL Albumin 1.8 (L) 3.5 - 5.0 g/dL Globulin 3.3 2.0 - 4.0 g/dL A-G Ratio 0.5 (L) 1.1 - 2.2 LACTIC ACID Collection Time: 01/31/20 10:01 AM  
Result Value Ref Range Lactic acid 1.4 0.4 - 2.0 MMOL/L  
MAGNESIUM Collection Time: 01/31/20 10:01 AM  
Result Value Ref Range Magnesium 1.9 1.6 - 2.4 mg/dL URINALYSIS W/ RFLX MICROSCOPIC Collection Time: 01/31/20 10:02 AM  
Result Value Ref Range Color DARK YELLOW Appearance TURBID (A) CLEAR Specific gravity 1.022 1.003 - 1.030    
 pH (UA) 6.0 5.0 - 8.0 Protein 30 (A) NEG mg/dL Glucose NEGATIVE  NEG mg/dL Ketone TRACE (A) NEG mg/dL Blood LARGE (A) NEG Urobilinogen 0.2 0.2 - 1.0 EU/dL Nitrites POSITIVE (A) NEG Leukocyte Esterase SMALL (A) NEG    
 WBC 5-10 0 - 4 /hpf  
 RBC 0-5 0 - 5 /hpf Epithelial cells FEW FEW /lpf Bacteria 4+ (A) NEG /hpf Amorphous Crystals 2+ (A) NEG Budding yeast PRESENT (A) NEG    
BILIRUBIN, CONFIRM Collection Time: 01/31/20 10:02 AM  
Result Value Ref Range  Bilirubin UA, confirm NEGATIVE  NEG

## 2020-01-31 NOTE — PROGRESS NOTES
Reason for Readmission:     Dehydration, diarrhea (last admission 1/9-1/14 for PNA) RUR Score/Risk Level:     8 low risk Is a Care Conference indicated: unit IDR rounds Did you attend your follow up appointment (s): If not, why not: 
 
    
Resources/supports as identified by patient/family:   family Top Challenges facing patient (as identified by patient/family and CM): Finances/Medication cost?     No challenges reported Transportation      Pt's son or daughter provides transportation Support system or lack thereof?     family Living arrangements? Lives with son, 1 story house, 4 steps to enter Self-care/ADLs/Cognition? Currently dependent for all self care, currently alert x 3, very hard of hearing Current Advanced Directive/Advance Care Plan:  DNR 1/15/20 Plan for utilizing home health:   [facility] was supposed to start this week Transition of Care Plan:    Based on readmission, the patient's previous Plan of Care discharge to [place] [address] and rehab, [facility] transportation 
 has been evaluated and/or modified. The current Transition of Care Plan is: 1. Patient in ED bed waiting for inpatient admission 2. Patient will need 2nd IM letter at discharge 3. Patient's daughters acknowledge that family is beginning discussion for long term plans for patient. Patient and family would benefit from Medicaid screening for long term plans. 4.  Patient's daughters request to wait for medical and therapy recommendations regarding discharge disposition. If patient needs SNF at discharge, family does not want patient to return to [address] [place] [address]. Patient is a 80year old female admitted 1/31 for dehydration and diarrhea. Patient alert and oriented, resting in bed, patient very hard of hearing so CM met with patient's daughters for evaluation. Demographic information verified and correct.   Insurance information verified and correct. Patient lives with her (mentally challenged) son, no home oxygen, has a cane and rollator but not currently ambulatory, is current with [facility] (to start this week). Patient uses [address] Patient's daughters state patient has not been walking and is currently dependent for all care. Patient does not drive, her son or daughter provide transportation. Patient was discharged from [place] [address] and rehab on Monday 1/27 and sent [facility] with [facility]. Patient's family do not want patient to return to [place] [address] and rehab if SNF is needed. Care Management Interventions PCP Verified by CM: Ashely Soto NP) Mode of Transport at Discharge: Other (see comment)(pt's son or daughter can transport at dc) Transition of Care Consult (CM Consult): Discharge Planning Discharge Durable Medical Equipment: No 
Physical Therapy Consult: No 
Occupational Therapy Consult: No 
Speech Therapy Consult: No 
Current Support Network: Own Home(lives with son, 1 story house, 4 steps to enter) Confirm Follow Up Transport: Family Discharge Location Discharge Placement: Unable to determine at this time Mata Bhatia, RN, BSN [facility] [phone]

## 2020-01-31 NOTE — ED NOTES
TRANSFER - OUT REPORT: 
 
Verbal report given to Arabella(name) on Tommy Lynch  being transferred to PCU(unit) for routine progression of care Report consisted of patients Situation, Background, Assessment and  
Recommendations(SBAR). Information from the following report(s) SBAR, Kardex, ED Summary, Procedure Summary, Intake/Output, MAR, Accordion, Recent Results, Med Rec Status and Cardiac Rhythm Afib was reviewed with the receiving nurse. Lines:  
Peripheral IV 01/31/20 Left Hand (Active) Site Assessment Clean, dry, & intact 1/31/2020  9:26 AM  
Phlebitis Assessment 0 1/31/2020  9:26 AM  
Infiltration Assessment 0 1/31/2020  9:26 AM  
Dressing Status Clean, dry, & intact 1/31/2020  9:26 AM  
Dressing Type Transparent 1/31/2020  9:26 AM  
   
Peripheral IV 01/31/20 Right Wrist (Active) Site Assessment Clean, dry, & intact 1/31/2020 10:10 AM  
Phlebitis Assessment 0 1/31/2020 10:10 AM  
Infiltration Assessment 0 1/31/2020 10:10 AM  
Dressing Status Clean, dry, & intact 1/31/2020 10:10 AM  
Dressing Type Transparent 1/31/2020 10:10 AM  
   
Peripheral IV 01/31/20 Left Hand (Active) Site Assessment Clean, dry, & intact 1/31/2020  2:02 PM  
Phlebitis Assessment 0 1/31/2020  2:02 PM  
Infiltration Assessment 0 1/31/2020  2:02 PM  
Dressing Status Clean, dry, & intact 1/31/2020  2:02 PM  
Dressing Type Transparent 1/31/2020  2:02 PM  
  
 
Opportunity for questions and clarification was provided. Patient transported with: 
 Monitor And RN

## 2020-01-31 NOTE — ED PROVIDER NOTES
EMERGENCY DEPARTMENT HISTORY AND PHYSICAL EXAM 
 
 
Date: 1/31/2020 Patient Name: Randie Gitelman Patient Age and Sex: 80 y.o. female History of Presenting Illness Chief Complaint Patient presents with  Diarrhea  
  diarrhea vomiting for 2 to 3 days weak; rescue gave 500ml NS pta; pt in Afib RVR per rescue History Provided By: Patient and Patient's Daughter Ability to gather history was limited by: Dementia HPI: Randie Gitelman, 80 y.o. female brought to the emergency department for diarrhea and vomiting for the past few days. Generalized weakness, fatigue, mild lethargy. Fever 100.6 degrees at home. History is somewhat limited by patient's dementia. She has no complaints. Denies any pain anywhere. She was recently hospitalized for UTI and pneumonia and just recently left a rehab facility. She was to started a day or 2 ago on vancomycin for C. difficile. Location:   
Quality:     
Severity:   
Duration:  
Timing:     
Context:   
Modifying factors:  
Associated symptoms:  
 
Pt denies any other alleviating or exacerbating factors. There are no other complaints, changes or physical findings at this time. Past Medical History:  
Diagnosis Date  Disc disease, degenerative, lumbar or lumbosacral   
 HTN (hypertension) 4/9/2010  Hypercholesteremia 4/9/2010  Osteoporosis, age related Past Surgical History:  
Procedure Laterality Date [address] UNLISTED  ENDOSCOPY, COLON, DIAGNOSTIC    
 HX CATARACT REMOVAL    
 HX GYN    
 
 
PCP: Majo Ivory NP Past History Past Medical History: 
Past Medical History:  
Diagnosis Date  Disc disease, degenerative, lumbar or lumbosacral   
 HTN (hypertension) 4/9/2010  Hypercholesteremia 4/9/2010  Osteoporosis, age related Past Surgical History: 
Past Surgical History:  
Procedure Laterality Date [address] UNLISTED  ENDOSCOPY, COLON, DIAGNOSTIC    
  HX CATARACT REMOVAL    
 HX GYN Family History: 
Family History Problem Relation Age of Onset  Heart Disease Mother  Cancer Father   
     colon  No Known Problems Sister  No Known Problems Brother Social History: 
Social History Tobacco Use  Smoking status: Former Smoker Last attempt to quit: 5/3/1986 Years since quittin.7  Smokeless tobacco: Never Used Substance Use Topics  Alcohol use: Yes Comment: wine occas  Drug use: No  
 
 
Allergies: 
No Known Allergies Current Medications: No current facility-administered medications on file prior to encounter. Current Outpatient Medications on File Prior to Encounter Medication Sig Dispense Refill  escitalopram oxalate (LEXAPRO) 5 mg tablet Take 5 mg by mouth daily.  melatonin 5 mg tablet Take 5 mg by mouth nightly as needed (sleep distubrance).  vancomycin (VANCOCIN) 125 mg capsule Take 125 mg by mouth four (4) times daily.  dilTIAZem CD (CARDIZEM CD) 180 mg ER capsule Take 1 Cap by mouth daily for 90 days. 30 Cap 2  pravastatin (PRAVACHOL) 20 mg tablet Take 1 Tab by mouth nightly for 60 days. 30 Tab 1  
 aspirin 81 mg chewable tablet Take 1 Tab by mouth daily.  lisinopril (PRINIVIL, ZESTRIL) 20 mg tablet TAKE 1 TABLET BY MOUTH ONCE DAILY 90 Tab 3  [DISCONTINUED] vancomycin (VANCOCIN) 250 mg capsule Take 125 mg by mouth every six (6) hours.  [DISCONTINUED] senna-docusate (PERICOLACE) 8.6-50 mg per tablet Take 1 Tab by mouth nightly for 60 days. 30 Tab 1 Review of Systems Review of Systems Constitutional: Positive for fatigue and fever. Psychiatric/Behavioral: Positive for confusion. All other systems reviewed and are negative. Physical Exam  
Vital Signs Patient Vitals for the past 24 hrs: 
 Temp Pulse Resp BP SpO2  
20 1440  (!) 143 26 101/50 92 % 20 1432  (!) 142 24  92 % 20 1430  (!) 138 25  94 % 01/31/20 1420  (!) 140 25 106/49 95 % 01/31/20 1400  (!) 149 16 123/57 96 % 01/31/20 1340    105/44   
01/31/20 1339  (!) 141 26  94 % 01/31/20 1338  (!) 147 23  93 % 01/31/20 1337  (!) 145 25  92 % 01/31/20 1320  (!) 170 21 103/68 95 % 01/31/20 1314 (!) 100.8 °F (38.2 °C) (!) 165 21 105/47 95 % 01/31/20 1300  (!) 161 25 105/47 90 % 01/31/20 1240  (!) 176 21 130/53 95 % 01/31/20 1052 99.2 °F (37.3 °C) (!) 164 21 141/60 95 % 01/31/20 0955  (!) 200  113/58   
01/31/20 0927 99.8 °F (37.7 °C) (!) 183 24 134/59 95 % Physical Exam 
Vitals signs and nursing note reviewed. Constitutional:   
   General: She is not in acute distress. Appearance: She is well-developed. HENT:  
   Head: Normocephalic and atraumatic. Mouth/Throat:  
   Mouth: Mucous membranes are dry. Eyes:  
   General:     
   Right eye: No discharge. Left eye: No discharge. Conjunctiva/sclera: Conjunctivae normal.  
Neck: Musculoskeletal: Normal range of motion and neck supple. Cardiovascular:  
   Rate and Rhythm: Tachycardia present. Rhythm irregular. Heart sounds: Normal heart sounds. No murmur. Comments: Extremely tachycardic Pulmonary:  
   Effort: Pulmonary effort is normal. No respiratory distress. Breath sounds: Rhonchi present. No wheezing. Comments: Mild bilateral rhonchi Abdominal:  
   General: There is no distension. Palpations: Abdomen is soft. Tenderness: There is no abdominal tenderness. Musculoskeletal: Normal range of motion. General: No deformity. Skin: 
   General: Skin is warm and dry. Findings: No rash. Neurological:  
   Mental Status: She is alert. She is confused. Psychiatric:     
   Mood and Affect: Affect is blunt. Behavior: Behavior is withdrawn. Cognition and Memory: Cognition is impaired. Diagnostic Study Results Labs Recent Results (from the past 24 hour(s)) EKG, 12 LEAD, INITIAL Collection Time: 01/31/20  9:37 AM  
Result Value Ref Range Ventricular Rate 182 BPM  
 Atrial Rate 241 BPM  
 QRS Duration 68 ms Q-T Interval 260 ms QTC Calculation (Bezet) 452 ms Calculated R Axis 8 degrees Calculated T Axis -139 degrees Diagnosis Atrial fibrillation with rapid ventricular response Marked ST abnormality, possible inferior subendocardial injury When compared with ECG of 09-JAN-2020 17:19, 
Atrial fibrillation has replaced Sinus rhythm Vent. rate has increased BY  95 BPM 
ST now depressed in Inferior leads ST now depressed in Lateral leads T wave inversion now evident in Inferior leads T wave inversion now evident in Lateral leads CBC WITH AUTOMATED DIFF Collection Time: 01/31/20 10:01 AM  
Result Value Ref Range WBC 22.1 (H) 3.6 - 11.0 K/uL  
 RBC 4.05 3.80 - 5.20 M/uL  
 HGB 11.5 11.5 - 16.0 g/dL HCT 34.3 (L) 35.0 - 47.0 % MCV 84.7 80.0 - 99.0 FL  
 MCH 28.4 26.0 - 34.0 PG  
 MCHC 33.5 30.0 - 36.5 g/dL  
 RDW 15.3 (H) 11.5 - 14.5 % PLATELET 722 256 - 042 K/uL MPV 10.5 8.9 - 12.9 FL  
 NRBC 0.0 0  WBC ABSOLUTE NRBC 0.00 0.00 - 0.01 K/uL NEUTROPHILS 73 32 - 75 % BAND NEUTROPHILS 17 % LYMPHOCYTES 9 (L) 12 - 49 % MONOCYTES 1 (L) 5 - 13 % EOSINOPHILS 0 0 - 7 % BASOPHILS 0 0 - 1 % IMMATURE GRANULOCYTES 0 0.0 - 0.5 % ABS. NEUTROPHILS 19.9 (H) 1.8 - 8.0 K/UL  
 ABS. LYMPHOCYTES 2.0 0.8 - 3.5 K/UL  
 ABS. MONOCYTES 0.2 0.0 - 1.0 K/UL  
 ABS. EOSINOPHILS 0.0 0.0 - 0.4 K/UL  
 ABS. BASOPHILS 0.0 0.0 - 0.1 K/UL  
 ABS. IMM. GRANS. 0.0 0.00 - 0.04 K/UL  
 DF MANUAL    
 RBC COMMENTS NORMOCYTIC, NORMOCHROMIC    
 WBC COMMENTS TOXIC GRANULATION    
METABOLIC PANEL, COMPREHENSIVE Collection Time: 01/31/20 10:01 AM  
Result Value Ref Range Sodium 135 (L) 136 - 145 mmol/L Potassium 2.9 (L) 3.5 - 5.1 mmol/L Chloride 104 97 - 108 mmol/L  
 CO2 22 21 - 32 mmol/L  Anion gap 9 5 - 15 mmol/L  
 Glucose 159 (H) 65 - 100 mg/dL BUN 14 6 - 20 MG/DL Creatinine 0.58 0.55 - 1.02 MG/DL  
 BUN/Creatinine ratio 24 (H) 12 - 20 GFR est AA >60 >60 ml/min/1.73m2 GFR est non-AA >60 >60 ml/min/1.73m2 Calcium 7.2 (L) 8.5 - 10.1 MG/DL Bilirubin, total 0.6 0.2 - 1.0 MG/DL  
 ALT (SGPT) 8 (L) 12 - 78 U/L  
 AST (SGOT) 9 (L) 15 - 37 U/L Alk. phosphatase 66 45 - 117 U/L Protein, total 5.1 (L) 6.4 - 8.2 g/dL Albumin 1.8 (L) 3.5 - 5.0 g/dL Globulin 3.3 2.0 - 4.0 g/dL A-G Ratio 0.5 (L) 1.1 - 2.2 LACTIC ACID Collection Time: 01/31/20 10:01 AM  
Result Value Ref Range Lactic acid 1.4 0.4 - 2.0 MMOL/L  
MAGNESIUM Collection Time: 01/31/20 10:01 AM  
Result Value Ref Range Magnesium 1.9 1.6 - 2.4 mg/dL URINALYSIS W/ RFLX MICROSCOPIC Collection Time: 01/31/20 10:02 AM  
Result Value Ref Range Color DARK YELLOW Appearance TURBID (A) CLEAR Specific gravity 1.022 1.003 - 1.030    
 pH (UA) 6.0 5.0 - 8.0 Protein 30 (A) NEG mg/dL Glucose NEGATIVE  NEG mg/dL Ketone TRACE (A) NEG mg/dL Blood LARGE (A) NEG Urobilinogen 0.2 0.2 - 1.0 EU/dL Nitrites POSITIVE (A) NEG Leukocyte Esterase SMALL (A) NEG    
 WBC 5-10 0 - 4 /hpf  
 RBC 0-5 0 - 5 /hpf Epithelial cells FEW FEW /lpf Bacteria 4+ (A) NEG /hpf Amorphous Crystals 2+ (A) NEG Budding yeast PRESENT (A) NEG    
BILIRUBIN, CONFIRM Collection Time: 01/31/20 10:02 AM  
Result Value Ref Range Bilirubin UA, confirm NEGATIVE  NEG Radiologic Studies XR CHEST PORT Final Result IMPRESSION: Mild right lower lobe airspace disease. CT Results  (Last 48 hours) None CXR Results  (Last 48 hours) 01/31/20 1118  XR CHEST PORT Final result Impression:  IMPRESSION: Mild right lower lobe airspace disease. Narrative:  EXAM: XR CHEST PORT INDICATION: sepsis, recent pneumonia COMPARISON: January 9  
   
 FINDINGS: A portable AP radiograph of the chest was obtained at there is a mild  
airspace disease in the right lower lobe hours. The patient is on a cardiac  
monitor. The lungs are clear. There is atherosclerosis of the aorta. The bones  
and soft tissues are grossly within normal limits. Procedures EKG Date/Time: 1/31/2020 3:21 PM 
Performed by: Unruly Fallon MD 
Authorized by: Unruly Fallon MD  
 
ECG reviewed by ED Physician in the absence of a cardiologist: yes Previous ECG:  
  Previous ECG:  Compared to current Similarity:  No change Interpretation: Interpretation: non-specific Rate:  
  ECG rate assessment: tachycardic Rhythm:  
  Rhythm: atrial fibrillation Ectopy:  
  Ectopy: none QRS:  
  QRS axis:  Normal 
ST segments: ST segments:  Normal 
T waves:  
  T waves: normal   
CRITICAL CARE (ASAP ONLY) Performed by: Unruly Fallon MD 
Authorized by: Unruly Fallon MD  
 
Critical care provider statement:  
  Critical care time (minutes):  50 
  Critical care was necessary to treat or prevent imminent or life-threatening deterioration of the following conditions:  Sepsis and cardiac failure Critical care was time spent personally by me on the following activities:  Ordering and performing treatments and interventions, ordering and review of laboratory studies, pulse oximetry, re-evaluation of patient's condition, evaluation of patient's response to treatment, examination of patient, development of treatment plan with patient or surrogate and review of old charts Medical Decision Making Provider Notes (Medical Decision Making):  
80-year-old female presenting with fever, lethargy, nausea, in the setting of recent UTI and C. difficile and pneumonia. She is DNR/DNI. On exam she is very tachycardic, atrial fibrillation with RVR. Mild rhonchi bilaterally. Dry mucous membranes. Leukocytosis of 22,000, and borderline fever. She meets sepsis criteria. Patient was given broad-spectrum antibiotics and 30 mL/kg fluid bolus. For rapid ventricular response she was started on diltiazem infusion, which was up titrated to maximal dosage, but she continued to have very high heart rates. Amiodarone was added for additional rate/rhythm control. Cardiology was paged twice but I did not receive a call back. Admit to stepdown medical unit. Claudette Murillo MD 
3:19 PM 
 
I completed by sepsis reevaluation at 12:45 PM.  Blood pressures remained stable, still tachycardic on diltiazem infusion. Consults: 
 
 
Medications Administered During ED Course: 
Medications  
dilTIAZem (CARDIZEM) 100 mg in dextrose 5% (MBP/ADV) 100 mL infusion (15 mg/hr IntraVENous Rate Change 1/31/20 1106) amiodarone (CORDARONE) 375 mg/250 mL D5W infusion (1 mg/min IntraVENous New Bag 1/31/20 1335) Followed by  
amiodarone (CORDARONE) 375 mg/250 mL D5W infusion (has no administration in time range)  
metroNIDAZOLE (FLAGYL) IVPB premix 500 mg (has no administration in time range) aspirin chewable tablet 81 mg (has no administration in time range)  
lisinopril (PRINIVIL, ZESTRIL) tablet 20 mg (has no administration in time range)  
melatonin tablet 4.5 mg (has no administration in time range) pravastatin (PRAVACHOL) tablet 20 mg (has no administration in time range)  
vancomycin (FIRVANQ) 50 mg/mL oral solution 125 mg (has no administration in time range) 0.9% sodium chloride infusion (75 mL/hr IntraVENous New Bag 1/31/20 1412)  
acetaminophen (TYLENOL) tablet 650 mg (has no administration in time range)  
sodium chloride 0.9 % bolus infusion 500 mL (0 mL IntraVENous IV Completed 1/31/20 1011) dilTIAZem (CARDIZEM) injection 10 mg (10 mg IntraVENous Given 1/31/20 0955)  
0.9% sodium chloride infusion 1,701 mL (0 mL/kg × 56.7 kg IntraVENous IV Completed 1/31/20 1051) piperacillin-tazobactam (ZOSYN) 3.375 g in 0.9% sodium chloride (MBP/ADV) 100 mL (0 g IntraVENous IV Completed 1/31/20 1133) vancomycin (VANCOCIN) 1,000 mg in 0.9% sodium chloride (MBP/ADV) 250 mL (0 mg IntraVENous IV Completed 1/31/20 1354) potassium chloride 10 mEq in 100 ml IVPB (0 mEq IntraVENous IV Completed 1/31/20 1259)  
amiodarone (CORDARONE) 150 mg in dextrose 5% 100 ml bolus premix 150 mg (150 mg IntraVENous Given 1/31/20 1315)  
ketorolac (TORADOL) injection 15 mg (15 mg IntraVENous Given 1/31/20 1409) Diagnosis and Disposition Disposition:  Admitted Clinical Impression: 1. Sepsis secondary to UTI ([facility] [address] 2. Atrial fibrillation with RVR ([facility] [address] Attestation: 
I personally performed the services described in this documentation on this date 1/31/2020 for patient [place]. Mariela Vazquez MD 
 
 
 
I was the first provider for this patient on this visit. To the best of my ability I reviewed relevant prior medical records, electrocardiograms, laboratories, and radiologic studies. The patient's presenting problems were discussed, and the patient was in agreement with the care plan formulated and outlined with them. Mariela Vazquez MD 
 
Please note that this dictation was completed with Dragon voice recognition software. Quite often unanticipated grammatical, syntax, homophones, and other interpretive errors are inadvertently transcribed by the computer software. Please disregard these errors and excuse any errors that have escaped final proofreading.

## 2020-01-31 NOTE — PROGRESS NOTES
Pharmacy Medication Reconciliation The patient was interviewed regarding current PTA medication list, use and drug allergies;  family present in room and obtained permission from patient to discuss drug regimen with visitor(s) present. The patient was questioned regarding use of any other inhalers, topical products, over the counter medications, herbal medications, vitamin products or ophthalmic/nasal/otic medication use. Allergy Update: Patient has no known allergies. Recommendations/Findings: The following amendments were made to the patient's active medication list on file at [facility]:  
1) Additions: vancomycin 125mg capsules, Lexapro, melatonin 2) Deletions: senna-s 3) Changes:  
 
 
Pertinent Findings: patient started vancomycin capsules on 1/30/20. A stool sample was obtained but hadn't heard the results per family. 
 
-Clarified PTA med list with family/transfer papers. PTA medication list was corrected to the following:  
 
Prior to Admission Medications Prescriptions Last Dose Informant Taking?  
aspirin 81 mg chewable tablet  Family Member Yes Sig: Take 1 Tab by mouth daily. dilTIAZem CD (CARDIZEM CD) 180 mg ER capsule 1/31/2020 at Unknown time Family Member Yes Sig: Take 1 Cap by mouth daily for 90 days. escitalopram oxalate (LEXAPRO) 5 mg tablet  Family Member Yes Sig: Take 5 mg by mouth daily. lisinopril (PRINIVIL, ZESTRIL) 20 mg tablet  Family Member Yes Sig: TAKE 1 TABLET BY MOUTH ONCE DAILY  
melatonin 5 mg tablet  Family Member Yes Sig: Take 5 mg by mouth nightly as needed (sleep distubrance). pravastatin (PRAVACHOL) 20 mg tablet  Family Member Yes Sig: Take 1 Tab by mouth nightly for 60 days. vancomycin (VANCOCIN) 125 mg capsule 1/31/2020 at Unknown time Family Member Yes Sig: Take 125 mg by mouth four (4) times daily. Facility-Administered Medications: None Thank you, Jordan Schmitz, JAMALD

## 2020-02-01 NOTE — PROGRESS NOTES
Received call from tele-med concerning patient. Orders received for low UOP. 1 liter NS bolus over 4 hours due to age and size of patient.

## 2020-02-01 NOTE — PROGRESS NOTES
Notified tele-med that patient needs anti-medic, and is currently vomiting with nausea all shift. Orders received

## 2020-02-01 NOTE — HOSPICE
[facility] RN note:  Reviewed chart and discussed pt with palliative NP Oj Pittman who met with family while in ED. Berl Favors and this RN in to meet with pt, daughter Jose Morse and Siri Hodge. They want to meet with [address] now bus may request an additional meeting with son Rimma Horowitz present as he lives with pt as her PCG. Family expressing concern for pt's dramatic decline, realistic about prognosis and accepting that, \"she's just tired. \"  Stating that they do not want to keep coming back to the hospital as this is their 4th trip to the ED and 2nd admission since October. Pt appears dyspneic, drifting off during conversation, able to engage initially with a warm greeting. Family wanting enough support at home with hospice to enable a peaceful death. They are interviewing several agencies and have not made any decision. Information packet provided with 24hr contact information. Thank you for the opportunity to care for this pt and family. Please contact hospice at [phone] with any questions or concerns.

## 2020-02-01 NOTE — PROGRESS NOTES
0900- Discussed with CT possibility that pt will not be able to drink contrast. Adjusted orders. 0524- Notified CT that pt wasn't able to drink the second form of contrast and will need IV contrast only. 0957-GI consulted called in for Dr. Val Beckham. Dr Yeni Gaffney is on call. 1000- Dr. Yelena Steele in to see pt and family. 1400- Transport at room to take pt for CT scan. Assessed pt, RR 34, crackles heard throughout lungs. Stopped IVF and called Dr. Yelena Steele for orders. 1425- Lasix given. RR 24. Transport at room to take pt for xray then ct.

## 2020-02-01 NOTE — PROGRESS NOTES
Patient rested over night. NAD. Family at bedside. Hospitalist and Cards following, as well as pallative care. Pain and safety assessed Q2hrs with no issues. Around 2300, beacme apparent patient more involved than Cdiff and Afib RVR. Obtained lab work. Showed Lactic Acidosis and probable sepsis. No urine output at 0000, obtained order for Catherine after bladder scanning for 416. Placed catheter with ease with 350 brown cloudy urine return. Patient seemed much more relaxed. Actually required O2 via NC after catheterization. Bolus's given throughout night, as was follow up lab work. Zofran given once, before oral vanc which seemed to work well. At one point, when patient was relaxed and sleeping, rhythm changed into ST with Aflutter beats. EKG obtained, but patient woke and went back into Afib RVR. Family pleased with care. Reported off this AM to dayshift nurse.

## 2020-02-01 NOTE — PROGRESS NOTES
Hospitalist Progress Note NAME: Tato Mas :  7/3/1924 MRN:  [id number] Assessment / Plan: 
C. difficile colitis Severe sepsis due to above 
-C. difficile came back positive 
-Increase vancomycin to 250 mg 4 times daily. Continue IV Flagyl. 
-Check CT of abdomen to rule out pancolitis 
-Continue IV fluids. Repeat CBC in a.m. Leukocytosis is worsening. 
-She is at high risk of decompensation 
-Hospice consulted per family request 
 
Atrial fibrillation with rapid ventricular rate likely triggered by sepsis 
-Continue amiodarone drip. Continue Cardizem GTT. Continue IV metoprolol. 
-Cardiology is following 
-Closely monitor blood pressure due to borderline hypotension 
-Not a good candidate for anticoagulation due to high risk of bleeding Hypokalemia Hypophosphatemia Hypomagnesemia 
-Electrolytes replaced. Recheck in a.m. Dementia 
-Continue supportive care Body mass index is 22.14 kg/m².: 18.5 - 24.9 Normal weight Code Status: DNR/DN Surrogate Decision Maker: Children DVT Prophylaxis: SCDs GI Prophylaxis: not indicated 
  
 
Body mass index is 22.14 kg/m². Subjective: Chief Complaint / Reason for Physician Visit Reports diarrhea along with mild abdominal pain. No nausea or vomiting. Feels weak Family present at bedside and discussed plan Review of Systems: 
Symptom Y/N Comments  Symptom Y/N Comments Fever/Chills    Chest Pain Poor Appetite    Edema Cough    Abdominal Pain Sputum    Joint Pain SOB/[place]    Pruritis/Rash Nausea/vomit    Tolerating PT/OT Diarrhea    Tolerating Diet Constipation    Other Could NOT obtain due to:   
 
Objective: VITALS:  
Last 24hrs VS reviewed since prior progress note. Most recent are: 
Patient Vitals for the past 24 hrs: 
 Temp Pulse Resp BP SpO2  
20 1036 97.1 °F (36.2 °C) (!) 140 18 104/72 96 % 20 0701  90   96 % 02/01/20 0700 99 °F (37.2 °C) 90 17 105/72 96 % 02/01/20 0600  (!) 140  102/67 95 % 02/01/20 0500  (!) 143  118/66 97 % 02/01/20 0400 97.7 °F (36.5 °C) (!) 137 18 101/65 96 % 02/01/20 0300  (!) 118  102/71 95 % 02/01/20 0220  (!) 125 16  94 % 02/01/20 0200  (!) 135  (!) 89/54 96 % 02/01/20 0145     (!) 85 % 02/01/20 0100  (!) 115  102/66 96 % 02/01/20 0000 98.4 °F (36.9 °C) (!) 106 17 99/57 96 % 01/31/20 2309  (!) 135  108/62   
01/31/20 2300  (!) 141  108/62 96 % 01/31/20 2236  (!) 160     
01/31/20 2200  (!) 148  113/70   
01/31/20 2100  (!) 134  108/82   
01/31/20 2000 97.5 °F (36.4 °C) (!) 129 18 102/60 94 % 01/31/20 1900  (!) 131  107/73   
01/31/20 1800  (!) 135  94/56 97 % 01/31/20 1749  (!) 135   97 % 01/31/20 1746  (!) 145   96 % 01/31/20 1735  (!) 148   99 % 01/31/20 1732  (!) 159   95 % 01/31/20 1730  (!) 136  116/51 96 % 01/31/20 1704  (!) 142   99 % 01/31/20 1700  (!) 140  110/71 96 % 01/31/20 1635  (!) 137   98 % 01/31/20 1631  (!) 130   96 % 01/31/20 1617  (!) 150   97 % 01/31/20 1614  (!) 162   95 % 01/31/20 1500 97.8 °F (36.6 °C) (!) 145 22 102/65 94 % 01/31/20 1440  (!) 143 26 101/50 92 % 01/31/20 1432  (!) 142 24  92 % 01/31/20 1430  (!) 138 25  94 % 01/31/20 1420  (!) 140 25 106/49 95 % 01/31/20 1400  (!) 149 16 123/57 96 % 01/31/20 1340    105/44   
01/31/20 1339  (!) 141 26  94 % 01/31/20 1338  (!) 147 23  93 % 01/31/20 1337  (!) 145 25  92 % 01/31/20 1320  (!) 170 21 103/68 95 % 01/31/20 1314 (!) 100.8 °F (38.2 °C) (!) 165 21 105/47 95 % 01/31/20 1300  (!) 161 25 105/47 90 % Intake/Output Summary (Last 24 hours) at 2/1/2020 1258 Last data filed at 2/1/2020 0600 Gross per 24 hour Intake 4311.13 ml Output 410 ml Net 3901.13 ml PHYSICAL EXAM: 
General: Thin, frail, elderly ENT:  Mery Shade. Anicteric sclerae. MMM Resp: CTA bilaterally, no wheezing or rales. No accessory muscle use CV:  Regular  rhythm,  No edema GI:  Soft, mild generalized tenderness, no guarding Neurologic:  Alert and awake, normal speech, Psych:   Not anxious nor agitated Skin:  No rashes. No jaundice Reviewed most current lab test results and cultures  YES Reviewed most current radiology test results   YES Review and summation of old records today    NO Reviewed patient's current orders and MAR    YES 
PMH/SH reviewed - no change compared to H&P Current Facility-Administered Medications:  
  vancomycin (FIRVANQ) 50 mg/mL oral solution 250 mg, 250 mg, Oral, Q6H, Jarad Weems MD, 250 mg at 20 1134 
  iopamidoL (ISOVUE-370) 76 % injection 100 mL, 100 mL, IntraVENous, RAD ONCE, Te Weems MD 
  zinc oxide-cod liver oil (DESITIN) 40 % paste, , Topical, DAILY PRN, Te Alexander MD 
  metoprolol (LOPRESSOR) injection 2.5 mg, 2.5 mg, IntraVENous, Q4H, Janett Carrington MD 
  dilTIAZem (CARDIZEM) 100 mg in dextrose 5% (MBP/ADV) 100 mL infusion, 0-15 mg/hr, IntraVENous, TITRATE, Luwanna Snellen, MD, Last Rate: 10 mL/hr at 20 1154, 10 mg/hr at 20 1154   metoprolol (LOPRESSOR) injection 2.5 mg, 2.5 mg, IntraVENous, Q4H PRN, Luwanna Snellen, MD, 2.5 mg at 20 1134   [] amiodarone (CORDARONE) 375 mg/250 mL D5W infusion, 1 mg/min, IntraVENous, CONTINUOUS, Stopped at 20 1847 **FOLLOWED BY** amiodarone (CORDARONE) 375 mg/250 mL D5W infusion, 0.5 mg/min, IntraVENous, CONTINUOUS, Janette Lackey MD, Last Rate: 20 mL/hr at 20 0454, 0.5 mg/min at 20 0454   metroNIDAZOLE (FLAGYL) IVPB premix 500 mg, 500 mg, IntraVENous, Q8H, Maricruz Chapman MD, Last Rate: 100 mL/hr at 20 0623, 500 mg at 20 9161   aspirin chewable tablet 81 mg, 81 mg, Oral, DAILY, Maricruz Chapman MD, 81 mg at 20 0900 
  melatonin tablet 4.5 mg, 4.5 mg, Oral, QHS PRN, Maricruz Chapman MD 
   pravastatin (PRAVACHOL) tablet 20 mg, 20 mg, Oral, QHS, Yesi Land MD, 20 mg at 01/31/20 2206 
  0.9% sodium chloride infusion, 125 mL/hr, IntraVENous, CONTINUOUS, Kevin Varela MD, Last Rate: 125 mL/hr at 02/01/20 1024, 125 mL/hr at 02/01/20 1024   acetaminophen (TYLENOL) tablet 650 mg, 650 mg, Oral, Q4H PRN, Yesi Land MD 
  ondansetron [facility]) injection 4 mg, 4 mg, IntraVENous, Q8H PRN, Aric Patel MD, 4 mg at 02/01/20 0132 
________________________________________________________________________ Care Plan discussed with: 
  Comments Patient y Family RN y   
Care Manager Consultant Multidiciplinary team rounds were held today with , nursing, pharmacist and clinical coordinator. Patient's plan of care was discussed; medications were reviewed and discharge planning was addressed. ________________________________________________________________________ Total NON critical care TIME:  35  Minutes Total CRITICAL CARE TIME Spent:   Minutes non procedure based Comments >50% of visit spent in counseling and coordination of care    
________________________________________________________________________ Micheal Valdivia MD  
 
Procedures: see electronic medical records for all procedures/Xrays and details which were not copied into this note but were reviewed prior to creation of Plan. LABS: 
I reviewed today's most current labs and imaging studies. Pertinent labs include: 
Recent Labs 02/01/20 
0430 01/31/20 
1001 WBC 26.9* 22.1* HGB 12.0 11.5 HCT 35.7 34.3*  
 328 Recent Labs 02/01/20 
0430 01/31/20 
2331 01/31/20 
1001 * 132* 135* K 3.2* 3.6 2.9*  
 103 104 CO2 19* 20* 22 * 214* 159* BUN 17 18 14 CREA 0.71 0.75 0.58 CA 6.7* 7.2* 7.2*  
MG 1.7  --  1.9 PHOS 1.7*  --   --   
ALB  --   --  1.8* TBILI  --   --  0.6 SGOT  --   --  9* ALT  --   --  8*  
 
 
 Signed: Daniel Carey MD

## 2020-02-01 NOTE — PROGRESS NOTES
[employer] [facility] 
[phone] Nursing Note Patient Name: Patt Hernandez YOB: 1924 Age: 80 y.o. [employer] RN Note:  Hospice consult noted. Chart reviewed. Plan of care discussed with patients nurse & care manager. In to meet with the patient and her daughter's Rashaun Forrest and Booker. There is also a son but he was not present for information session  Discussed Hospice philosophy, general plan of care, levels of care, services and on call procedures. Family information packet provided & reviewed with both daughters. Bernardo Roberts would like to think it over and will contact hospice when decision is made. Thank you for the opportunity to be of service to this patient.

## 2020-02-01 NOTE — PROGRESS NOTES
Notified tele-med of critical lactic acid of 2.5, up from 1.4, and need for fluids above maintenance IVF

## 2020-02-01 NOTE — PROGRESS NOTES
Cardiology Progress Note 2/1/2020     Admit Date: 1/31/2020 Admit Diagnosis: Sepsis ([facility] 75.) [A41.9]  CC: none currently Assessment (per Dr Vanessa White): 1. Paroxysmal atrial fibrillation with RVR. 2. Chronic aspirin therapy. 3. Abdominal pain and diarrhea. 4. Hypokalemia, likely due to diarrhea paired with poor intake. 5. Hypertension. 6. Hypercholesterolemia. 9. Very advanced age. 8. Dementia. 9. Poor nutrition and oral intake. Hypoalbuminemia. 10. DNR. 
  
Plan  (per Dr Vanessa White):  
  
1. Agree with a rate control strategy. For now IV diltiazem, I added metoprolol 2.5 IV q6h. Discontinued lisinopril to make room for the metoprolol which may be increased if her BP allows this. Eventually to orals. 2. Avoid digoxin given her advanced age. 3. Continue amiodarone longterm. I think rhythm control via Afib suppression is tim for her. 4. Discussed anticoagulation options with her and her family. I would not push for full dosing in her given the overall falls/bleeding risk, but reduced dose anticoagulation instead of aspirin could be considered if desired. I am leaving the current strategy in place for now. 5. Agree with repleting the potassium. This will help out 
  
Suspect it will take a day to get enough BP reserve to consistently get metoprolol atop her diltiazem for better rate control. All questions answered for the patient and most importantly the family. 2/1: Marginal BPs with occ high HR with afib. WBC 27; Hg 12; Na 134 from 132; K 3.2; Cr 0.7. LA 2 from 2.5. Remains on asa; metoprolol 2.5 IV q6; dilt gtt @ 15; amio gtt @ 0.5. Remains off lisinopril. Controlling HR will be difficult with sepsis/C diff; wean dilt gtt as able: increase standing IV metoprolol with PRNs. For other plans, see orders. Hospital problem list  
Active Hospital Problems Diagnosis Date Noted  Sepsis ([facility] 75.) 01/31/2020 Subjective: Sheela Lynch reports cont diarrhea Chest pain X none  consistent with:  Non-cardiac CP Atypical CP None now  On going  Anginal CP Dyspnea X none  at rest  with exertion    
    improved  unchanged  worse PND X none  overnight Orthopnea X none  improved  unchanged  worse Presyncope X none  improved  unchanged  worse Ambulated in hallway without symptoms   Yes Ambulated in room without symptoms  Yes Objective:  
 Physical Exam: 
Overall VSSAF;   
Visit Vitals /72 Pulse (!) 140 Temp 97.1 °F (36.2 °C) Resp 18 Ht 5' 3\" (1.6 m) Wt 56.7 kg (125 lb) SpO2 96% BMI 22.14 kg/m² Temp (24hrs), Av.4 °F (36.9 °C), Min:97.1 °F (36.2 °C), Max:100.8 °F (38.2 °C) Patient Vitals for the past 8 hrs: 
 Pulse 20 1036 (!) 140  
20 0701 90  
20 0700 90  
20 0600 (!) 140  
20 0500 (!) 143  
20 0400 (!) 137  
20 0300 (!) 118 Patient Vitals for the past 8 hrs: 
 Resp  
20 1036 18  
20 0700 17  
20 0400 18 Patient Vitals for the past 8 hrs: 
 BP  
20 1036 104/72  
20 0700 105/72  
20 0600 102/67  
20 0500 118/66  
20 0400 101/65  
20 0300 102/71 Intake/Output Summary (Last 24 hours) at 2020 1046 Last data filed at 2020 0600 Gross per 24 hour Intake 6112.13 ml Output 410 ml Net 5702.13 ml General Appearance: Well developed, elderly, no acute distress. Ears/Nose/Mouth/Throat:   Normal MM; anicteric. JVP: WNL Resp:   clear to auscultation bilaterally. Nl resp effort. Cardiovascular:  IRRR, S1, S2 normal, no new murmur. No gallop or rub. Abdomen:   Soft, non-tender, bowel sounds are present. Extremities: No edema bilaterally. Skin: 
Neuro: Warm and dry. A/O x3, grossly nonfocal  
cath site intact w/o hematoma or new bruit; distal pulse unchanged  Yes Data Review: Telemetry independently reviewed  sinus x chronic afib  parox afib  NSVT  
 
ECG independently reviewed  NSR  afib  no significant changes  NSST-Tw chgs  
x no new ECG provided for review Lab results reviewed as noted below. Current medications reviewed as noted below. No results for input(s): PH, PCO2, PO2 in the last 72 hours. No results for input(s): CPK, CKMB, CKNDX, TROIQ in the last 72 hours. Recent Labs 02/01/20 
0430 01/31/20 
2331 01/31/20 
1001 * 132* 135* K 3.2* 3.6 2.9*  
 103 104 CO2 19* 20* 22 BUN 17 18 14 CREA 0.71 0.75 0.58 GFRAA >60 >60 >60  
* 214* 159* PHOS 1.7*  --   --   
CA 6.7* 7.2* 7.2* ALB  --   --  1.8* WBC 26.9*  --  22.1* HGB 12.0  --  11.5 HCT 35.7  --  34.3*  
  --  328 Lab Results Component Value Date/Time Cholesterol, total 172 05/15/2019 09:02 AM  
 HDL Cholesterol 81 05/15/2019 09:02 AM  
 LDL, calculated 74 05/15/2019 09:02 AM  
 Triglyceride 86 05/15/2019 09:02 AM  
 CHOL/HDL Ratio 2.4 10/12/2010 08:54 AM  
 
Recent Labs  
  01/31/20 
1001 SGOT 9* AP 66  
TP 5.1* ALB 1.8*  
GLOB 3.3 No results for input(s): INR, PTP, APTT, INREXT in the last 72 hours. No components found for: [place] Current Facility-Administered Medications Medication Dose Route Frequency  magnesium sulfate 1 g/100 ml IVPB (premix or compounded)  1 g IntraVENous ONCE  
 vancomycin (FIRVANQ) 50 mg/mL oral solution 250 mg  250 mg Oral Q6H  
 iopamidoL (ISOVUE-370) 76 % injection 100 mL  100 mL IntraVENous RAD ONCE  zinc oxide-cod liver oil (DESITIN) 40 % paste   Topical DAILY PRN  
 dilTIAZem (CARDIZEM) 100 mg in dextrose 5% (MBP/ADV) 100 mL infusion  0-15 mg/hr IntraVENous TITRATE  amiodarone (CORDARONE) 375 mg/250 mL D5W infusion  0.5 mg/min IntraVENous CONTINUOUS  
 metroNIDAZOLE (FLAGYL) IVPB premix 500 mg  500 mg IntraVENous Q8H  
 aspirin chewable tablet 81 mg  81 mg Oral DAILY  melatonin tablet 4.5 mg  4.5 mg Oral QHS PRN  pravastatin (PRAVACHOL) tablet 20 mg  20 mg Oral QHS  
 0.9% sodium chloride infusion  125 mL/hr IntraVENous CONTINUOUS  
 acetaminophen (TYLENOL) tablet 650 mg  650 mg Oral Q4H PRN  
 metoprolol (LOPRESSOR) injection 2.5 mg  2.5 mg IntraVENous Q6H  
 ondansetron (ZOFRAN) injection 4 mg  4 mg IntraVENous Q8H PRN Annabella Albarran MD

## 2020-02-01 NOTE — PROGRESS NOTES
Notified tele-med that RN requests BMP and Lactic. Potassium 2.9 in ER, replaced, but not rechecked. Lactic due to current clinical presentation. Orders received

## 2020-02-02 NOTE — PROGRESS NOTES
Cardiology Progress Note 2/2/2020     Admit Date: 1/31/2020 Admit Diagnosis: Sepsis [facility] [A41.9]  CC: none currently Assessment (per Dr Silvino Basurto): 1. Paroxysmal atrial fibrillation with RVR. 2. Chronic aspirin therapy. 3. Abdominal pain and diarrhea. 4. Hypokalemia, likely due to diarrhea paired with poor intake. 5. Hypertension. 6. Hypercholesterolemia. 9. Very advanced age. 8. Dementia. 9. Poor nutrition and oral intake. Hypoalbuminemia. 10. DNR. 
  
Plan  (per Dr Silvino Basurto):  
  
1. Agree with a rate control strategy. For now IV diltiazem, I added metoprolol 2.5 IV q6h. Discontinued lisinopril to make room for the metoprolol which may be increased if her BP allows this. Eventually to orals. 2. Avoid digoxin given her advanced age. 3. Continue amiodarone longterm. I think rhythm control via Afib suppression is tim for her. 4. Discussed anticoagulation options with her and her family. I would not push for full dosing in her given the overall falls/bleeding risk, but reduced dose anticoagulation instead of aspirin could be considered if desired. I am leaving the current strategy in place for now. 5. Agree with repleting the potassium. This will help out 
  
Suspect it will take a day to get enough BP reserve to consistently get metoprolol atop her diltiazem for better rate control. All questions answered for the patient and most importantly the family. 2/1: Marginal BPs with occ high HR with afib. WBC 27; Hg 12; Na 134 from 132; K 3.2; Cr 0.7. LA 2 from 2.5. Remains on asa; metoprolol 2.5 IV q6; dilt gtt @ 15; amio gtt @ 0.5. Remains off lisinopril. Controlling HR will be difficult with sepsis/C diff; wean dilt gtt as able: increase standing IV metoprolol with PRNs. 2/2: Converted to NSR w HRs 60-70s; BPs 120s. WBC 30; Hg 12.3; Cr 1.04; Na 131 Remains on amio gtt @ 0.5; dilt gtt off; metoprolol 2.5 IV q4. Rec: change amio & metoprolol to PO. For other plans, see orders. Hospital problem list  
Active Hospital Problems Diagnosis Date Noted  Sepsis ([facility] 2020 Subjective: Rui Lynch reports less diarrhea Chest pain X none  consistent with:  Non-cardiac CP Atypical CP None now  On going  Anginal CP Dyspnea X none  at rest  with exertion    
    improved  unchanged  worse PND X none  overnight Orthopnea X none  improved  unchanged  worse Presyncope X none  improved  unchanged  worse Ambulated in hallway without symptoms   Yes Ambulated in room without symptoms  Yes Objective:  
 Physical Exam: 
Overall VSSAF;   
Visit Vitals /60 (BP 1 Location: Right arm, BP Patient Position: At rest) Pulse 70 Temp 97.4 °F (36.3 °C) Resp 12 Ht 5' 3\" (1.6 m) Wt 56.7 kg (125 lb) SpO2 98% BMI 22.14 kg/m² Temp (24hrs), Av.6 °F (36.4 °C), Min:97.1 °F (36.2 °C), Max:98 °F (36.7 °C) Patient Vitals for the past 8 hrs: 
 Pulse 20 1002 70  
20 0700 72  
20 0600 66  
20 0500 65  
20 0400 73  
20 0300 67 Patient Vitals for the past 8 hrs: 
 Resp  
20 0700 12  
20 0400 16 Patient Vitals for the past 8 hrs: 
 BP  
20 1002 126/60  
20 0700 122/66  
20 0600 127/63  
20 0500 107/61  
20 0400 120/51  
20 0300 116/51 Intake/Output Summary (Last 24 hours) at 2020 1022 Last data filed at 2020 0006 Gross per 24 hour Intake 1775.08 ml Output 514 ml Net 1261.08 ml General Appearance: Well developed, elderly, no acute distress. Ears/Nose/Mouth/Throat:   Normal MM; anicteric. JVP: WNL Resp:   clear to auscultation bilaterally. Nl resp effort. Cardiovascular:  RRR, S1, S2 normal, no new murmur. No gallop or rub. Abdomen:   Soft, non-tender, bowel sounds are present. Extremities: No edema bilaterally. Skin: 
Neuro: Warm and dry.  
A/O x3, grossly nonfocal  
 cath site intact w/o hematoma or new bruit; distal pulse unchanged  Yes Data Review:    
Telemetry independently reviewed x sinus  chronic afib  parox afib  NSVT  
 
ECG independently reviewed  NSR  afib  no significant changes  NSST-Tw chgs  
x no new ECG provided for review Lab results reviewed as noted below. Current medications reviewed as noted below. No results for input(s): PH, PCO2, PO2 in the last 72 hours. No results for input(s): CPK, CKMB, CKNDX, TROIQ in the last 72 hours. Recent Labs 02/02/20 
0415 02/01/20 
0430 01/31/20 
2331 01/31/20 
1001 * 134* 132* 135* K 3.6 3.2* 3.6 2.9*  
 106 103 104 CO2 19* 19* 20* 22 BUN 22* 17 18 14 CREA 1.04* 0.71 0.75 0.58 GFRAA 60* >60 >60 >60  
* 179* 214* 159* PHOS 1.7* 1.7*  --   --   
CA 7.2* 6.7* 7.2* 7.2* ALB 1.5*  --   --  1.8* WBC 30.8* 26.9*  --  22.1* HGB 12.6 12.0  --  11.5 HCT 37.1 35.7  --  34.3*  
 300  --  328 Lab Results Component Value Date/Time Cholesterol, total 172 05/15/2019 09:02 AM  
 HDL Cholesterol 81 05/15/2019 09:02 AM  
 LDL, calculated 74 05/15/2019 09:02 AM  
 Triglyceride 86 05/15/2019 09:02 AM  
 CHOL/HDL Ratio 2.4 10/12/2010 08:54 AM  
 
Recent Labs 02/02/20 0415 01/31/20 
1001 SGOT 13* 9* AP 87 66  
TP 4.7* 5.1* ALB 1.5* 1.8*  
GLOB 3.2 3.3 No results for input(s): INR, PTP, APTT, INREXT, INREXT in the last 72 hours. No components found for: [place] Current Facility-Administered Medications Medication Dose Route Frequency  potassium, sodium phosphates (NEUTRA-PHOS) packet 2 Packet  2 Packet Oral ONCE  
 vancomycin (FIRVANQ) 50 mg/mL oral solution 500 mg  500 mg Oral Q6H  
 zinc oxide-cod liver oil (DESITIN) 40 % paste   Topical DAILY PRN  
 metoprolol (LOPRESSOR) injection 2.5 mg  2.5 mg IntraVENous Q4H  
 dilTIAZem (CARDIZEM) 100 mg in dextrose 5% (MBP/ADV) 100 mL infusion  0-15 mg/hr IntraVENous TITRATE  metoprolol (LOPRESSOR) injection 2.5 mg  2.5 mg IntraVENous Q4H PRN  
 amiodarone (CORDARONE) 375 mg/250 mL D5W infusion  0.5 mg/min IntraVENous CONTINUOUS  
 metroNIDAZOLE (FLAGYL) IVPB premix 500 mg  500 mg IntraVENous Q8H  
 aspirin chewable tablet 81 mg  81 mg Oral DAILY  melatonin tablet 4.5 mg  4.5 mg Oral QHS PRN  pravastatin (PRAVACHOL) tablet 20 mg  20 mg Oral QHS  acetaminophen (TYLENOL) tablet 650 mg  650 mg Oral Q4H PRN  
 ondansetron (ZOFRAN) injection 4 mg  4 mg IntraVENous Q8H PRN Jose Meade MD

## 2020-02-02 NOTE — PROGRESS NOTES
Hospitalist Progress Note NAME: Ashwini Mcclain :  7/3/1924 MRN:  [id number] Assessment / Plan: 
C. difficile colitis Severe sepsis due to above resolved 
-C. difficile came back positive 
-Increase vancomycin to 500 mg 4 times daily. Continue IV Flagyl. -CT shows severe colitis  
-restart iv fluids as she is dry.  
-She is at high risk of decompensation 
-Hospice following Atrial fibrillation with rapid ventricular rate likely triggered by sepsis -now in NSR 
-Changed to po amiodarone and metoprolol 
-cards following 
-Not a good candidate for anticoagulation due to high risk of bleeding Hypokalemia Hypophosphatemia Hypomagnesemia 
-Phos replaced and recheck in am  
 
 
Dementia 
-Continue supportive care Body mass index is 22.14 kg/m².: 18.5 - 24.9 Normal weight Code Status: DNR/DN Surrogate Decision Maker: Children DVT Prophylaxis: SCDs GI Prophylaxis: not indicated 
  
 
Body mass index is 22.14 kg/m². Subjective: Chief Complaint / Reason for Physician Visit Reports diarrhea along with mild abdominal pain. No nausea or vomiting. HR is better Still feels weak Family at bed side and discussed plan Review of Systems: 
Symptom Y/N Comments  Symptom Y/N Comments Fever/Chills    Chest Pain Poor Appetite    Edema Cough    Abdominal Pain Sputum    Joint Pain SOB/[place]    Pruritis/Rash Nausea/vomit    Tolerating PT/OT Diarrhea    Tolerating Diet Constipation    Other Could NOT obtain due to:   
 
Objective: VITALS:  
Last 24hrs VS reviewed since prior progress note. Most recent are: 
Patient Vitals for the past 24 hrs: 
 Temp Pulse Resp BP SpO2  
20 1542 97.8 °F (36.6 °C) 70 18 136/69 96 % 20 1230     97 % 20 1131  72  121/70   
20 1002 97.4 °F (36.3 °C) 70  126/60 98 % 20 0700 97.8 °F (36.6 °C) 72 12 122/66 98 % 20 0400 98 °F (36.7 °C) 73 16 120/51 91 % 02/02/20 0200  67  105/67 97 % 02/02/20 0100  65  100/47 94 % 02/02/20 0000  74  108/53 97 % 02/01/20 2301 97.8 °F (36.6 °C) 69 18 93/79 96 % 02/01/20 2300  (!) 130  111/61 96 % 02/01/20 2200  (!) 127  102/61 90 % 02/01/20 2100  (!) 118  109/55 97 % 02/01/20 2000 97.6 °F (36.4 °C) (!) 133 26 99/67 97 % 02/01/20 1900  (!) 131  94/57 (!) 87 % 02/01/20 1800  (!) 125  97/81 97 % Intake/Output Summary (Last 24 hours) at 2/2/2020 1724 Last data filed at  Gross per 24 hour Intake 1775.08 ml Output 910 ml Net 865.08 ml PHYSICAL EXAM: 
General: Thin, frail, elderly ENT:  [place]. Anicteric sclerae. MMM Resp:  CTA bilaterally, no wheezing or rales. No accessory muscle use CV:  Regular  rhythm,  No edema GI:  Soft, mild generalized tenderness, no guarding Neurologic:  Alert and awake, normal speech, Psych:   Not anxious nor agitated Skin:  No rashes. No jaundice Reviewed most current lab test results and cultures  YES Reviewed most current radiology test results   YES Review and summation of old records today    NO Reviewed patient's current orders and MAR    YES 
PMH/SH reviewed - no change compared to H&P Current Facility-Administered Medications:  
  vancomycin (FIRVANQ) 50 mg/mL oral solution 500 mg, 500 mg, Oral, Q6H, Jarad Weems MD, 500 mg at 02/02/20 1400 
  amiodarone (CORDARONE) tablet 200 mg, 200 mg, Oral, TID, Jazmyn Candelario MD, 200 mg at 02/02/20 1539 
  metoprolol tartrate (LOPRESSOR) tablet 12.5 mg, 12.5 mg, Oral, BID, Kel Carrington MD, 12.5 mg at 02/02/20 1131 
  0.9% sodium chloride infusion, 50 mL/hr, IntraVENous, CONTINUOUS, Jarad Weems MD, Last Rate: 50 mL/hr at 02/02/20 1050, 50 mL/hr at 02/02/20 1050   zinc oxide-cod liver oil (DESITIN) 40 % paste, , Topical, DAILY PRN, Trish Mercado, Shalonda Burgos MD 
  metoprolol (LOPRESSOR) injection 2.5 mg, 2.5 mg, IntraVENous, Q4H PRN, Dimas Montanez MD, 2.5 mg at 02/01/20 1134 
  metroNIDAZOLE (FLAGYL) IVPB premix 500 mg, 500 mg, IntraVENous, Q8H, Janny Herrera MD, Last Rate: 100 mL/hr at 02/02/20 1359, 500 mg at 02/02/20 1359   aspirin chewable tablet 81 mg, 81 mg, Oral, DAILY, Janny Herrera MD, 81 mg at 02/02/20 9562 
  melatonin tablet 4.5 mg, 4.5 mg, Oral, QHS PRN, Janny Herrera MD 
  pravastatin (PRAVACHOL) tablet 20 mg, 20 mg, Oral, QHS, Janny Herrera MD, Stopped at 02/01/20 2200   acetaminophen (TYLENOL) tablet 650 mg, 650 mg, Oral, Q4H PRN, Janny Herrera MD 
  ondansetron [facility]) injection 4 mg, 4 mg, IntraVENous, Q8H PRN, Mathew Parkinson MD, 4 mg at 02/01/20 0132 
________________________________________________________________________ Care Plan discussed with: 
  Comments Patient y Family RN y   
Care Manager Consultant Multidiciplinary team rounds were held today with , nursing, pharmacist and clinical coordinator. Patient's plan of care was discussed; medications were reviewed and discharge planning was addressed. ________________________________________________________________________ Total NON critical care TIME:  35  Minutes Total CRITICAL CARE TIME Spent:   Minutes non procedure based Comments >50% of visit spent in counseling and coordination of care    
________________________________________________________________________ Bravo Tao MD  
 
Procedures: see electronic medical records for all procedures/Xrays and details which were not copied into this note but were reviewed prior to creation of Plan. LABS: 
I reviewed today's most current labs and imaging studies. Pertinent labs include: 
Recent Labs 02/02/20 0415 02/01/20 
0430 01/31/20 
1001 WBC 30.8* 26.9* 22.1* HGB 12.6 12.0 11.5 HCT 37.1 35.7 34.3*  
 300 328 Recent Labs 02/02/20 0415 02/01/20 
0430 01/31/20 
2331 01/31/20 
1001 * 134* 132* 135* K 3.6 3.2* 3.6 2.9*  
 106 103 104 CO2 19* 19* 20* 22 * 179* 214* 159* BUN 22* 17 18 14 CREA 1.04* 0.71 0.75 0.58  
CA 7.2* 6.7* 7.2* 7.2*  
MG 2.2 1.7  --  1.9 PHOS 1.7* 1.7*  --   --   
ALB 1.5*  --   --  1.8* TBILI 0.5  --   --  0.6 SGOT 13*  --   --  9* ALT 7*  --   --  8* Signed: Matt Durant MD

## 2020-02-02 NOTE — ROUTINE PROCESS
Bedside, Verbal and Written shift change report given to Travon Chin RN (oncoming nurse) by Zheng Moraes RN (offgoing nurse). Report included the following information SBAR, Kardex, MAR and Recent Results.

## 2020-02-02 NOTE — PROGRESS NOTES
Problem: Risk for Spread of Infection Goal: Prevent transmission of infectious organism to others Description Prevent the transmission of infectious organisms to other patients, staff members, and visitors. Outcome: Progressing Towards Goal 
  
Problem: Patient Education:  Go to Education Activity Goal: Patient/Family Education Outcome: Progressing Towards Goal 
  
Problem: Falls - Risk of 
Goal: *Absence of Falls Description Document Amber Houston Fall Risk and appropriate interventions in the flowsheet. Outcome: Progressing Towards Goal 
Note: Fall Risk Interventions: 
Mobility Interventions: Strengthening exercises (ROM-active/passive) Mentation Interventions: Familiar objects from home Medication Interventions: Evaluate medications/consider consulting pharmacy Elimination Interventions: Toileting schedule/hourly rounds Problem: Patient Education: Go to Patient Education Activity Goal: Patient/Family Education Outcome: Progressing Towards Goal 
  
Problem: Pressure Injury - Risk of 
Goal: *Prevention of pressure injury Description Document Parker Scale and appropriate interventions in the flowsheet. Outcome: Progressing Towards Goal 
Note: Pressure Injury Interventions: 
Sensory Interventions: Assess changes in LOC Moisture Interventions: Absorbent underpads, Apply protective barrier, creams and emollients, Check for incontinence Q2 hours and as needed Activity Interventions: Pressure redistribution bed/mattress(bed type) Mobility Interventions: Float heels, HOB 30 degrees or less, Pressure redistribution bed/mattress (bed type) Nutrition Interventions: Offer support with meals,snacks and hydration Friction and Shear Interventions: Apply protective barrier, creams and emollients, Foam dressings/transparent film/skin sealants Problem: Patient Education: Go to Patient Education Activity Goal: Patient/Family Education Outcome: Progressing Towards Goal

## 2020-02-03 NOTE — PROGRESS NOTES
7224- Bedside report received. Pt resting in bed. No s/s of distress at present time. 0199- Assessment complete and charted. Family at bedside. All questioned answered. 1045- SCDs placed on pt and Desitin placed on pt when incontinent care done. 1300- Discussed with family again importance of being gowned up since pt is positive for cdif. Family states they have been caring for her for weeks. Refusing to gown up unless they are in direct care with pt. Educated on cdif sores living for up to 30days outside the body and importance of using soap and water to wash hands as well as bleach on shoes. 1500- Incontinent care done and pt turned q2. 
 
1730- Venelex put on pt left heel per order. Incontinent care done and pt turned.

## 2020-02-03 NOTE — PROGRESS NOTES
Bedside shift change report GIVEN TO Saul Martinez RN. Report included the following information SBAR and Kardex. SIGNIFICANT CHANGES DURING SHIFT:  None. CONCERNS TO ADDRESS WITH MD:  None. PCU NURSING NOTE Admission Date 1/31/2020 Admission Diagnosis Sepsis ([address] [A41.9] Consults IP CONSULT TO CARDIOLOGY 
IP CONSULT TO HOSPITALIST 
IP CONSULT TO PALLIATIVE CARE - PROVIDER Cardiac Monitoring [x] Yes [] No  
  
Purposeful Hourly Rounding [x] Yes   
Benedicto Score Total Score: 3 Benedicto score 3 or > [x] Bed Alarm [] Avasys [] 1:1 sitter [] Patient refused (Signed refusal form in chart) Parker Score Parker Score: 12 Parker score 14 or < [] PMT consult [x] Wound Care consult  
 []  Specialty bed  [] Nutrition consult Influenza Vaccine Oxygen needs? [] Room air Oxygen @  [x]1L    []2L    []3L   []4L    []5L   []6L via NC Chronic home O2 use? [] Yes [x] No 
Perform O2 challenge test and document in progress note using [employer] (.[employer]) Last bowel movement Last Bowel Movement Date: 02/02/20 Urinary Catheter Urinary Catheter 02/01/20 Catherine-Indications for Use: Accurate measurement of urinary output Urinary Catheter 02/01/20 Catherine-Urine Output (mL): 400 ml LDAs Peripheral IV 02/03/20 Left;Posterior Hand (Active) Readmission Risk Assessment Tool Score Low Risk 12 Total Score 3 Has Seen PCP in Last 6 Months (Yes=3, No=0)  
 4 IP Visits Last 12 Months (1-3=4, 4=9, >4=11) 5 Pt. Coverage (Medicare=5 , Medicaid, or Self-Pay=4) Criteria that do not apply:  
 . Living with Significant Other. Assisted Living. LTAC. SNF. or  
Rehab Patient Length of Stay (>5 days = 3) Charlson Comorbidity Score (Age + Comorbid Conditions) Expected Length of Stay - - - Actual Length of Stay 3

## 2020-02-03 NOTE — PROGRESS NOTES
Hospitalist Progress Note NAME: Bekah Gaytan :  7/3/1924 MRN:  [id number] 51-year-old female with past medical history of recent treatment for pneumonia presented to hospital with diarrhea and was noted to have C. difficile colitis. She also developed atrial fibrillation with rapid ventricular rate. She is improving on antibiotics. Assessment / Plan: 
C. difficile colitis Severe sepsis due to above resolved POA 
-Continue vancomycin  500 mg 4 times daily. Continue IV Flagyl. -CT shows severe colitis  
-Continue IV fluids 
-WBC is starting to downtrend 
-Hospice consulted per family request and will follow up on discharge Atrial fibrillation with rapid ventricular rate likely triggered by sepsis -now in NSR 
-Changed to po amiodarone and metoprolol 
-cards following 
-Not a good candidate for anticoagulation due to high risk of bleeding Acute kidney injury likely prerenal 
-Baseline creatinine is around 1.7 and creatinine is 1.5 
-Urine culture is normal 
-Continue IV fluids. Repeat BMP in a.m. Hypokalemia Hypophosphatemia Hypomagnesemia 
-Check electrolytes in a.m. Dementia 
-Continue supportive care Deep tissue injury to left heel 
-Continue wound care Disposition:  
Possible discharge on  if diarrhea is improved and white count is trending down Body mass index is 22.14 kg/m².: 18.5 - 24.9 Normal weight Code Status: DNR/DN Surrogate Decision Maker: Children DVT Prophylaxis: SCDs GI Prophylaxis: not indicated 
  
 
Body mass index is 22.14 kg/m². Subjective: Chief Complaint / Reason for Physician Visit Reports diarrhea along with mild abdominal pain. No nausea or vomiting. HR is better Still feels weak Family at bed side and discussed plan Review of Systems: 
Symptom Y/N Comments  Symptom Y/N Comments Fever/Chills    Chest Pain Poor Appetite    Edema Cough    Abdominal Pain Sputum    Joint Pain SOB/[place]    Pruritis/Rash Nausea/vomit    Tolerating PT/OT Diarrhea    Tolerating Diet Constipation    Other Could NOT obtain due to:   
 
Objective: VITALS:  
Last 24hrs VS reviewed since prior progress note. Most recent are: 
Patient Vitals for the past 24 hrs: 
 Temp Pulse Resp BP SpO2  
02/03/20 1116 96.9 °F (36.1 °C) 66 24 137/87 96 % 02/03/20 0700 97.2 °F (36.2 °C) 71 19 117/53 96 % 02/03/20 0352 97.3 °F (36.3 °C) 70 20 122/55 98 % 02/02/20 2349 97.3 °F (36.3 °C) 63 20 125/59 96 % 02/02/20 1959 97.7 °F (36.5 °C) 63 23 117/58 96 % 02/02/20 1542 97.8 °F (36.6 °C) 70 18 136/69 97 % Intake/Output Summary (Last 24 hours) at 2/3/2020 1312 Last data filed at 2/3/2020 2258 Gross per 24 hour Intake 250 ml Output 800 ml Net -550 ml PHYSICAL EXAM: 
General: Thin, frail, elderly ENT:  Nico [place]. Anicteric sclerae. MMM Resp:  CTA bilaterally, no wheezing or rales. No accessory muscle use CV:  Regular  rhythm,  No edema GI:  Soft, mild generalized tenderness, no guarding Neurologic:  Alert and awake, normal speech, Psych:   Not anxious nor agitated Skin:  No rashes. No jaundice Reviewed most current lab test results and cultures  YES Reviewed most current radiology test results   YES Review and summation of old records today    NO Reviewed patient's current orders and MAR    YES 
PMH/SH reviewed - no change compared to H&P Current Facility-Administered Medications:  
  heparin (porcine) injection 5,000 Units, 5,000 Units, SubCUTAneous, Q8H, Irwin Weems MD 
  vancomycin [facility]) 50 mg/mL oral solution 500 mg, 500 mg, Oral, Q6H, Jarad Weems MD, 500 mg at 02/03/20 1252 
  amiodarone (CORDARONE) tablet 200 mg, 200 mg, Oral, TID, iNco Pimentel MD, 200 mg at 02/03/20 0847 
  metoprolol tartrate (LOPRESSOR) tablet 12.5 mg, 12.5 mg, Oral, BID, Nico Pimentel MD, 12.5 mg at 02/03/20 7536   0.9% sodium chloride infusion, 75 mL/hr, IntraVENous, CONTINUOUS, Sarahi Garza MD, Last Rate: 75 mL/hr at 02/03/20 1252, 75 mL/hr at 02/03/20 1252   zinc oxide-cod liver oil (DESITIN) 40 % paste, , Topical, DAILY PRN, Bettie Rivera MD 
  metoprolol (LOPRESSOR) injection 2.5 mg, 2.5 mg, IntraVENous, Q4H PRN, Jesus Donahue MD, 2.5 mg at 02/01/20 1134 
  metroNIDAZOLE (FLAGYL) IVPB premix 500 mg, 500 mg, IntraVENous, Q8H, Frakno Ferguson MD, Last Rate: 100 mL/hr at 02/03/20 1310, 500 mg at 02/03/20 1310   aspirin chewable tablet 81 mg, 81 mg, Oral, DAILY, Franko Ferguson MD, 81 mg at 02/03/20 0847 
  melatonin tablet 4.5 mg, 4.5 mg, Oral, QHS PRN, Franko Ferguson MD 
  pravastatin (PRAVACHOL) tablet 20 mg, 20 mg, Oral, QHS, Franko Ferguson MD, 20 mg at 02/02/20 2134   acetaminophen (TYLENOL) tablet 650 mg, 650 mg, Oral, Q4H PRN, Franko Ferguson MD 
  ondansetron [facility]) injection 4 mg, 4 mg, IntraVENous, Q8H PRN, Donovan Pena MD, 4 mg at 02/01/20 0132 
________________________________________________________________________ Care Plan discussed with: 
  Comments Patient y Family RN y   
Care Manager Consultant Multidiciplinary team rounds were held today with , nursing, pharmacist and clinical coordinator. Patient's plan of care was discussed; medications were reviewed and discharge planning was addressed. ________________________________________________________________________ Total NON critical care TIME:  35  Minutes Total CRITICAL CARE TIME Spent:   Minutes non procedure based Comments >50% of visit spent in counseling and coordination of care    
________________________________________________________________________ Crow Harris MD  
 
Procedures: see electronic medical records for all procedures/Xrays and details which were not copied into this note but were reviewed prior to creation of Plan.    
 
LABS: 
 I reviewed today's most current labs and imaging studies. Pertinent labs include: 
Recent Labs 02/03/20 
6595 02/02/20 
6935 02/01/20 
0430 WBC 29.2* 30.8* 26.9* HGB 13.1 12.6 12.0 HCT 40.0 37.1 35.7  307 300 Recent Labs 02/03/20 
5588 02/02/20 
3920 02/01/20 
0430 * 131* 134* K 3.7 3.6 3.2*  
 102 106 CO2 17* 19* 19* * 169* 179* BUN 30* 22* 17  
CREA 1.50* 1.04* 0.71  
CA 7.6* 7.2* 6.7* MG  --  2.2 1.7 PHOS  --  1.7* 1.7* ALB  --  1.5*  --   
TBILI  --  0.5  --   
SGOT  --  13*  --   
ALT  --  7*  --   
 
 
Signed: Raymond Rushing MD

## 2020-02-03 NOTE — PROGRESS NOTES
Problem: Risk for Spread of Infection Goal: Prevent transmission of infectious organism to others Description Prevent the transmission of infectious organisms to other patients, staff members, and visitors. Outcome: Progressing Towards Goal 
  
Problem: Patient Education:  Go to Education Activity Goal: Patient/Family Education Outcome: Progressing Towards Goal 
  
Problem: Falls - Risk of 
Goal: *Absence of Falls Description Document Jf Barbosa Fall Risk and appropriate interventions in the flowsheet. Outcome: Progressing Towards Goal 
Note: Fall Risk Interventions: 
Mobility Interventions: Bed/chair exit alarm, Patient to call before getting OOB Mentation Interventions: Bed/chair exit alarm Medication Interventions: Bed/chair exit alarm, Patient to call before getting OOB, Teach patient to arise slowly Elimination Interventions: Bed/chair exit alarm, Call light in reach Problem: Patient Education: Go to Patient Education Activity Goal: Patient/Family Education Outcome: Progressing Towards Goal 
  
Problem: Pressure Injury - Risk of 
Goal: *Prevention of pressure injury Description Document Parker Scale and appropriate interventions in the flowsheet. Outcome: Progressing Towards Goal 
Note: Pressure Injury Interventions: 
Sensory Interventions: Assess changes in LOC Moisture Interventions: Absorbent underpads, Apply protective barrier, creams and emollients Activity Interventions: Chair cushion, Increase time out of bed, PT/OT evaluation Mobility Interventions: Float heels, PT/OT evaluation Nutrition Interventions: Document food/fluid/supplement intake, Offer support with meals,snacks and hydration Friction and Shear Interventions: Feet elevated on foot rest, Lift sheet

## 2020-02-03 NOTE — CDMP QUERY
Pt admitted with Diarrhea/vomiting x 2-3 days. Pt noted to have \"Severe Sepsis\". If possible, please document in progress notes and d/c summary if \"Severe sepsis\" was POA: 
 
 Yes, \"Severe sepsis\"  was present at the time of the order to admit to the hospital 
 No, \"Severe sepsis\" was not present on admission and developed during the inpatient stay  Clinically you are unable to determine if \"Severe sepsis\" was present on admission The medical record reflects the following: 
  Risk Factors: C/o Diarrhea vomiting x 2-3 days; recently DC'd from hospital after treated for PNA Clinical Indicators: Tmax: 100.8; HR: 141-200; RR: 21-26. .... WBC:22.1. Genaro Becerril Bands: 19.9........ Genaro Becerril Na+:135 ^ 132*.......... Genaro Becerril Gluc: 159 ^ 214. ...... Genaro Becerril Alb: 1.8 ^ 1.5. .... Genaro Becerril Lac acid: 1.4 ^ 2.5 ^ 2.0.... Genaro Becerril C-diff toxin: POSITIVE FOR TOXIGENIC C. DIFFICILE . ...... Genaro Becerril CXR: Mild RLL airspace dz. ... Genaro Becerril CT abd/pelv: Consider severe infectious/inflammatory processes as etiology. ... Genaro Becerril Small to moderate B/L pleural effusions and bibasilar atelectasis Treatment: IVF NS bolus 1,701ml; Zosyn 3.375g IV; Vancomycin 1g IV; IVF NS gtt@ 75ml/hr; CT abd/pelv; CXR Thank you, Alicia Mike CDI

## 2020-02-03 NOTE — WOUND CARE
Wound Care Consult for the left heel pressure injury that was present on admission:  Chart reviewed and patient assessed for her heel. Assessment:  The left heel is purple (DTI) with some pink outlining the wound edges and it is fluid filled. Last time patient was here she had this wound and was prescribed Venelex ointment to be used 3 times per day at home and or the facility she went to. Unknown if this was consistently done. Would expect patient's wound to be much better now if it was consistently used and the heel was off loaded. Treatment: The left heel was placed in a HeelMedix boot to off load the heel and the SCD hose was placed in the button hole of the boot. The right heel is floated with a pillow. Venelex ointment was ordered for the left heel and complete off loading. Madai Gutierrez RN, BSN, [facility] Wound care nurse

## 2020-02-03 NOTE — PROGRESS NOTES
Cardiology Progress Note 2/3/2020     Admit Date: 2020 Admit Diagnosis: Sepsis ([facility] 75.) [A41.9] Assessment: 1. Paroxysmal atrial fibrillation with RVR. Now in sinus. 2. Chronic aspirin therapy. 3. Abdominal pain and C. Difficile diarrhea. 4. Hypokalemia, likely due to diarrhea paired with poor intake. Resolved. 5. Mild hyponatremia with hypovolemia. 6. NAVA likely due to volume depletion. 7. Hypertension. 8. Hypercholesterolemia. 5. Very advanced age. 10. Dementia. 11. Poor nutrition and oral intake. Hypoalbuminemia. 12. DNR. 
  
Plan:  
  
1. Agree with a rate and rhythm control strategy with metoprolol and amiodarone. 2. Avoid digoxin given her advanced age. 3. Continue amiodarone longterm. I think rhythm control via Afib suppression is tim for her. 4. Continue aspirin daily. 5. Agree with IVF. 
  
All questions answered for the patient and most importantly the family. For other plans, see orders. Hospital problem list  
Active Hospital Problems Diagnosis Date Noted  Sepsis ([facility] 75.) 2020 Subjective: Tracie Lynch reports less diarrhea Chest pain X none  consistent with:  Non-cardiac CP Atypical CP None now  On going  Anginal CP Dyspnea X none  at rest  with exertion    
    improved  unchanged  worse PND X none  overnight Orthopnea X none  improved  unchanged  worse Presyncope X none  improved  unchanged  worse Ambulated in hallway without symptoms   Yes Ambulated in room without symptoms  Yes Objective:  
 Physical Exam: 
Overall VSSAF;   
Visit Vitals /87 (BP 1 Location: Right arm, BP Patient Position: At rest) Pulse 66 Temp 96.9 °F (36.1 °C) Resp 24 Ht 5' 3\" (1.6 m) Wt 56.7 kg (125 lb) SpO2 96% BMI 22.14 kg/m² Temp (24hrs), Av.4 °F (36.3 °C), Min:96.9 °F (36.1 °C), Max:97.8 °F (36.6 °C) Patient Vitals for the past 8 hrs: 
 Pulse 20 1116 66  
 02/03/20 0700 71 Patient Vitals for the past 8 hrs: 
 Resp  
02/03/20 1116 24  
02/03/20 0700 19 Patient Vitals for the past 8 hrs: 
 BP  
02/03/20 1116 137/87  
02/03/20 0700 117/53 Intake/Output Summary (Last 24 hours) at 2/3/2020 1249 Last data filed at 2/3/2020 0219 Gross per 24 hour Intake 250 ml Output 800 ml Net -550 ml General Appearance: Well developed, elderly, no acute distress. Ears/Nose/Mouth/Throat:   Normal MM; anicteric. JVP: WNL Resp:   clear to auscultation bilaterally. Nl resp effort. Cardiovascular:  RRR, S1, S2 normal, no new murmur. No gallop or rub. Abdomen:   Soft, non-tender, bowel sounds are present. Extremities: No edema bilaterally. Skin: 
Neuro: Warm and dry. A/O x1, grossly nonfocal 
  
cath site intact w/o hematoma or new bruit; distal pulse unchanged  Yes Data Review:    
Telemetry independently reviewed x sinus  chronic afib  parox afib  NSVT  
 
ECG independently reviewed  NSR  afib  no significant changes  NSST-Tw chgs  
x no new ECG provided for review Lab results reviewed as noted below. Current medications reviewed as noted below. No results for input(s): PH, PCO2, PO2 in the last 72 hours. No results for input(s): CPK, CKMB, CKNDX, TROIQ in the last 72 hours. Recent Labs 02/03/20 
4507 02/02/20 
3511 02/01/20 
0430 * 131* 134* K 3.7 3.6 3.2*  
 102 106 CO2 17* 19* 19* BUN 30* 22* 17  
CREA 1.50* 1.04* 0.71 GFRAA 39* 60* >60  
* 169* 179* PHOS  --  1.7* 1.7*  
CA 7.6* 7.2* 6.7* ALB  --  1.5*  --   
WBC 29.2* 30.8* 26.9* HGB 13.1 12.6 12.0 HCT 40.0 37.1 35.7  307 300 Lab Results Component Value Date/Time Cholesterol, total 172 05/15/2019 09:02 AM  
 HDL Cholesterol 81 05/15/2019 09:02 AM  
 LDL, calculated 74 05/15/2019 09:02 AM  
 Triglyceride 86 05/15/2019 09:02 AM  
 CHOL/HDL Ratio 2.4 10/12/2010 08:54 AM  
 
Recent Labs 02/02/20 
1757 SGOT 13* AP 87 TP 4.7* ALB 1.5*  
GLOB 3.2 No results for input(s): INR, PTP, APTT, INREXT, INREXT in the last 72 hours. No components found for: [place] Current Facility-Administered Medications Medication Dose Route Frequency  vancomycin (FIRVANQ) 50 mg/mL oral solution 500 mg  500 mg Oral Q6H  
 amiodarone (CORDARONE) tablet 200 mg  200 mg Oral TID  metoprolol tartrate (LOPRESSOR) tablet 12.5 mg  12.5 mg Oral BID  
 0.9% sodium chloride infusion  75 mL/hr IntraVENous CONTINUOUS  
 zinc oxide-cod liver oil (DESITIN) 40 % paste   Topical DAILY PRN  
 metoprolol (LOPRESSOR) injection 2.5 mg  2.5 mg IntraVENous Q4H PRN  
 metroNIDAZOLE (FLAGYL) IVPB premix 500 mg  500 mg IntraVENous Q8H  
 aspirin chewable tablet 81 mg  81 mg Oral DAILY  melatonin tablet 4.5 mg  4.5 mg Oral QHS PRN  pravastatin (PRAVACHOL) tablet 20 mg  20 mg Oral QHS  acetaminophen (TYLENOL) tablet 650 mg  650 mg Oral Q4H PRN  
 ondansetron (ZOFRAN) injection 4 mg  4 mg IntraVENous Q8H PRN Gemma Vidal MD

## 2020-02-03 NOTE — PROGRESS NOTES
JASEN PLAN--Home with hospice. Called and spoke with daughter re hospice at home. She [facility] but they wanted to review other agencies. She states they are going to come together as a family tomorrow and will let me know which agency they will choose. Samanta Gordillo RN BSN CRM  [phone]

## 2020-02-04 NOTE — PROGRESS NOTES
Problem: Mobility Impaired (Adult and Pediatric) Goal: *Acute Goals and Plan of Care (Insert Text) Description FUNCTIONAL STATUS PRIOR TO ADMISSION: Patient was modified independent using a rolling walker for functional mobility until decline following recent hospital admission and SNF rehab stay. Was requiring assistance from family for transfers and ambulation upon returning home. Continued to decline with onset of diarrhea requiring increased assistance prior to hospital re-admission. HOME SUPPORT PRIOR TO ADMISSION: The patient lived with son but did not require assist until recently. One daughter lives close and will provide assistance as well. Other daughter lives out of state. Physical Therapy Goals Initiated 2/4/2020 1. Patient will move from supine to sit and sit to supine , scoot up and down and roll side to side in bed with minimal assistance/contact guard assist within 7 day(s). 2.  Patient will transfer from bed to chair and chair to bed with moderate assistance  using the least restrictive device within 7 day(s). 3.  Patient will perform sit to stand with moderate assistance  within 7 day(s). Outcome: Progressing Towards Goal 
PHYSICAL THERAPY EVALUATION Patient: Deni Deutsch (74 y.o. female) Date: 2/4/2020 Primary Diagnosis: Sepsis ([facility] [A41.9] Precautions: DNR, Fall, Contact(C-Diff; Left heel wound) ASSESSMENT Based on the objective data described below, the patient presents with generalized weakness, impaired balance, decreased activity tolerance/endurance, intermittent confusion, and left heel wound all limiting patients functional mobility. Per patient and family, patient with a significant decline in mobility since recent hospital/SNF rehab stay and onset of diarrhea. Plans are for patient to return home on Hospice care but patient wanting to remain as independent as possible.  She tolerated sitting EOB but required assistance throughout and fatigued quickly. Noted SpO2 decrease with activity but recovers quickly on RA with rest break. Provided family education with patients daughter on safe positioning and techniques to assist patient. Patient requesting PT continue to follow and she will benefit from therapy to allow for continued family training and maximize functional return to home. Would recommend christofer lift to recliner chair with nursing staff at this time. Current Level of Function Impacting Discharge (mobility/balance): Mod-Max A bed mobility and sitting balance, unable to safely attempt transfers Functional Outcome Measure: The patient scored 10/100 on the Barthel Index outcome measure which is indicative of high dependency for functional mobility and ADLs. Other factors to consider for discharge: supportive family, significant decline from baseline, significant care/dependency requirements Patient will benefit from skilled therapy intervention to address the above noted impairments. PLAN : 
Recommendations and Planned Interventions: bed mobility training, transfer training, therapeutic exercises, neuromuscular re-education, patient and family training/education and therapeutic activities Frequency/Duration: Patient will be followed by physical therapy:  3 times a week to address goals. Recommendation for discharge: (in order for the patient to meet his/her long term goals) Plan for patient to return home on [address] This discharge recommendation: 
Has been made in collaboration with the attending provider and/or case management IF patient discharges home will need the following DME: bedside commode and hospital bed, (will defer to Hospice on possibility for lift and w/c dependent on patients progress and ability to get OOB) SUBJECTIVE:  
Patient stated I want to try.  OBJECTIVE DATA SUMMARY:  
HISTORY:   
Past Medical History:  
Diagnosis Date  Disc disease, degenerative, lumbar or lumbosacral   
 HTN (hypertension) 4/9/2010  Hypercholesteremia 4/9/2010  Osteoporosis, age related Past Surgical History:  
Procedure Laterality Date [address] UNLISTED  ENDOSCOPY, COLON, DIAGNOSTIC    
 HX CATARACT REMOVAL    
 HX GYN Personal factors and/or comorbidities impacting plan of care:  
 
Home Situation Home Environment: Private residence # Steps to Enter: 3 (question if ramp available) One/Two Story Residence: One story Living Alone: No(lives with son, daughter will be staying to help as well) Support Systems: Child(rosie) Patient Expects to be Discharged to[de-identified] Private residence Current DME Used/Available at Home: [employer], straight, Cane, quad, Safety frame [employer], Shower chair, Walker, rollator, Walker, rolling Tub or Shower Type: Shower EXAMINATION/PRESENTATION/DECISION MAKING:  
Critical Behavior: 
Neurologic State: Alert, Confused Orientation Level: Disoriented to place, Disoriented to situation, Disoriented to time, Oriented to person Cognition: Follows commands Range Of Motion: 
AROM: Generally decreased, functional 
  
  
  
  
  
  
  
Strength:   
Strength: Generally decreased, functional 
  
  
  
  
  
  
Tone & Sensation:  
Tone: Normal 
  
  
  
  
Sensation: Intact Coordination: 
Coordination: Within functional limits Functional Mobility: 
Bed Mobility: 
Rolling: Moderate assistance; Additional time Supine to Sit: Moderate assistance;Maximum assistance Sit to Supine: Maximum assistance Scooting: Total assistance Transfers: 
Sit to Stand: (unable to safely attempt) Balance:  
Sitting: Impaired(posterior lean throughout) Sitting - Static: Poor (constant support) Sitting - Dynamic: Poor (constant support) Required assistance to maintain sitting balance throughout. Left and posterior lean.  Left lean improved with hip positioning but maintained posterior lean throughout. Unable to correct without physical assist. She fatigues quickly with sitting activity. Standing: (unable to safely attempt) Ambulation/Gait Training: 
 Unable to safely attempt Functional Measure: 
Barthel Index: 
 
Bathin Bladder: 0 Bowels: 5 Groomin Dressin Feedin Mobility: 0 Stairs: 0 Toilet Use: 0 Transfer (Bed to Chair and Back): 0 Total: 10/100 The Barthel ADL Index: Guidelines 1. The index should be used as a record of what a patient does, not as a record of what a patient could do. 2. The main aim is to establish degree of independence from any help, physical or verbal, however minor and for whatever reason. 3. The need for supervision renders the patient not independent. 4. A patient's performance should be established using the best available evidence. Asking the patient, friends/relatives and nurses are the usual sources, but direct observation and common sense are also important. However direct testing is not needed. 5. Usually the patient's performance over the preceding 24-48 hours is important, but occasionally longer periods will be relevant. 6. Middle categories imply that the patient supplies over 50 per cent of the effort. 7. Use of aids to be independent is allowed. Daniela Limon., Barthel, D.W. ([id number]). Functional evaluation: the Barthel Index. [facility] (14)2. SELENA Quevedo, Norma Strong., Pamela Cherry.[facility], [address] [place] [address] (). Measuring the change indisability after inpatient rehabilitation; comparison of the responsiveness of the Barthel Index and Functional [place] Measure. Journal of Neurology, Neurosurgery, and Psychiatry, 66(4), 751-737. Darby Matos N.COLT.A, BBII Agarwal, & Celso Jack, MLadanA. (2004.) Assessment of post-stroke quality of life in cost-effectiveness studies: The usefulness of the Barthel Index and the EuroQoL-5D. [facility], 13, [phone] Physical Therapy Evaluation Charge Determination History Examination Presentation Decision-Making MEDIUM  Complexity : 1-2 comorbidities / personal factors will impact the outcome/ POC  MEDIUM Complexity : 3 Standardized tests and measures addressing body structure, function, activity limitation and / or participation in recreation  MEDIUM Complexity : Evolving with changing characteristics  Other outcome measures Barthel Index  MEDIUM Based on the above components, the patient evaluation is determined to be of the following complexity level: MEDIUM Pain Rating: 
No c/o pain Activity Tolerance:  
Poor, requires frequent rest breaks and observed SOB with activity Please refer to the flowsheet for vital signs taken during this treatment. After treatment patient left in no apparent distress:  
Supine in bed, Heels elevated for pressure relief, Call bell within reach and Caregiver / family present COMMUNICATION/EDUCATION:  
The patients plan of care was discussed with: Registered Nurse. Fall prevention education was provided and the patient/caregiver indicated understanding., Patient/family have participated as able in goal setting and plan of care. and Patient/family agree to work toward stated goals and plan of care. Thank you for this referral. 
Mikayla Darling, PT, DPT Time Calculation: 29 mins

## 2020-02-04 NOTE — PROGRESS NOTES
JASEN PLAN--Home with hospice. Spoke with daughter, Praful Clark, and she states the family will go with [facility]. They want to have patient home with hospice care. Referral sent to [facility] to meet with the other daughter who will be up today after 1330pm.  
 
Keisha Nice. Rand SANCHEZ BSN CRM  [phone]

## 2020-02-04 NOTE — HOSPICE
[facility] Good Help to Those in Need 
[phone] Nursing Note Patient Name: Brie Barrera YOB: 1924 Age: 80 y.o. [facility] RN Note:  
 
Notified that patient/family has chosen to go home with [facility]. There is an available home admission time open for Marita Grimaldo to admit patient  tomorrow at 16:00. This nurse spoke briefly with hospitalist Dr. Shyam Carolina who agrees with plan. Discussed plan of care with patient's nurse Jessie Quiñones. This nurse in to meet with patient. Daughter Sue Proctor was at bedside. Nurse reviewed hospice philosophy, levels of care, services, on-call procedures. Discussed DME needed:  Will order hospital bed, overbed table, oxygen, and bedside commode. Will order symptom kit (morphine and lorazepam). Supplies will be ordered by hospice admission nurse. Nurse discussed hospice home admission plan with Suman Baugh. Care Manager to arrange transport home via [facility] at tomorrow. This nurse sent [employer] message to Dr. Katya Thurman updating him that pt/family agree to discharge home tomorrow with home hospice admission scheduled for 16:00. Please discharge patient home with Catherine cath. Thank you for the opportunity to be of service to this patient. BRANDYN Mina, RN-BC Hospice Nurse Liaison [facility] [place]:  [phone] Office: [phone]

## 2020-02-04 NOTE — PROGRESS NOTES
1900 - Bedside and Verbal shift change report given to Nereida Perea RN (oncoming nurse) by Claude Aurora, RN (offgoing nurse). Report included the following information SBAR, Kardex, Intake/Output, MAR, Recent Results and Cardiac Rhythm NSR. Pt resting quietly in bed at this time. Family at bedside. NS infusing at 75 mL/hr. Pt has trace edema in extremities. Catherine in place. Will monitor UO. VSS. Pt denies any pain or discomfort at this time. Pt has SCD's on. Heels floated, L heel in boot. L heel has venelex and is open to air. Call bell within reach. Will continue to monitor. 1915 - Pt family stated \"Dont worry about me wearing a gown, I use soap and water to wash my hands and I will be okay. \" Explained to pt and family reasoning for precautions. 2035 - Pt turned onto L side with heels floated and L heel in boot. Will continue to monitor. 2120 - Flagyl infusing. Venelex applied to L heel per order. Family requesting boot be taken off of leg for now. Heels elevated on pillows. Stressed importance to family of elevating foot for recovery of wound. Will apply again at a later time. Call bell within reach. 2236 - Pt cleaned after incontinence of small brown/yellow BM. Desitin applied to bottom and foam overtop to protect area from stool. Catherine care completed. Pt turned onto R side with heels floated. Heel boot placed on pt. Pt denies any pain at this time. VSS. Call bell within reach. Family at bedside. 2240 - Placed 1L O2 NC on pt. Pt o2 saturation decreased to low 80% on RA while sleeping. When arrived to pt room, Pt O2 sat returned to 94%. Pt family member states \"this always happens when pt is snoring. \" Will continue to monitor. 0038 - Pt turned onto L side with heels floated. L heel in boot. Vancomycin administered. Call bell within reach. Family at bedside. Will continue to monitor. 0335 - Labs drawn and sent, VSS.  Pt turned onto R side with heels elevated and L heel in boot. Call bell within reach. Family at bedside. Will continue to monitor. 0700 - Shift report given to Malik Doty RN.

## 2020-02-04 NOTE — PROGRESS NOTES
Hospitalist Progress Note NAME: Alexandra Martinez :  7/3/1924 MRN:  [id number] 63-year-old female with past medical history of recent treatment for pneumonia presented to hospital with diarrhea and was noted to have C. difficile colitis. She also developed atrial fibrillation with rapid ventricular rate. She is improving on antibiotics. Assessment / Plan: 
C. difficile colitis Severe sepsis due to above resolved 
-Continue vancomycin  500 mg 4 times daily. Continue IV Flagyl. -CT shows severe colitis  
-Continue IV fluids 
-WBC is starting to downtrend 
-Hospice consulted per family request and will follow up on discharge Atrial fibrillation with rapid ventricular rate likely triggered by sepsis -now in NSR 
-Changed to po amiodarone and metoprolol 
-cards following 
-Not a good candidate for anticoagulation due to high risk of bleeding Acute kidney injury likely prerenal 
-Baseline creatinine is around 1.7 and creatinine is 1.5 
-Urine culture is normal 
-Continue IV fluids. Repeat BMP in a.m. Hypokalemia Hypophosphatemia Hypomagnesemia 
-Check electrolytes in a.m. Dementia 
-Continue supportive care Deep tissue injury to left heel 
-Continue wound care Disposition:  
Possible discharge on  if diarrhea is improved and white count is trending down Decreased urine output, increased IVF, monitor renal function CBC tomorrow am  
 
 
Body mass index is 22.14 kg/m².: 18.5 - 24.9 Normal weight Code Status: DNR/DN Surrogate Decision Maker: Children DVT Prophylaxis: SCDs GI Prophylaxis: not indicated 
  
 
Body mass index is 22.14 kg/m². Subjective: Chief Complaint / Reason for Physician Visit Diarrhea slightly better, no chest pain or shortness of breath Review of Systems: 
Symptom Y/N Comments  Symptom Y/N Comments Fever/Chills    Chest Pain Poor Appetite    Edema Cough    Abdominal Pain Sputum    Joint Pain SOB/[place]    Pruritis/Rash Nausea/vomit    Tolerating PT/OT Diarrhea    Tolerating Diet Constipation    Other Could NOT obtain due to:   
 
Objective: VITALS:  
Last 24hrs VS reviewed since prior progress note. Most recent are: 
Patient Vitals for the past 24 hrs: 
 Temp Pulse Resp BP SpO2  
02/04/20 0938  85  136/63   
02/04/20 0735 97.8 °F (36.6 °C) 79 19 119/58 94 % 02/04/20 0335 97.1 °F (36.2 °C) 85 19 134/67 97 % 02/03/20 2236 97 °F (36.1 °C) 66 20 114/53 94 % 02/03/20 2214  66  110/54   
02/03/20 1900 97.5 °F (36.4 °C) 69 20 (!) 119/99 96 % 02/03/20 1501 97 °F (36.1 °C) 67 17 129/70 92 % 02/03/20 1500  67 18  92 % Intake/Output Summary (Last 24 hours) at 2/4/2020 1134 Last data filed at 2/4/2020 1107 Gross per 24 hour Intake 4003.92 ml Output 575 ml Net 3428.92 ml PHYSICAL EXAM: 
General: Thin, frail, elderly ENT:  Tracy Becerra. Anicteric sclerae. MMM Resp:  CTA bilaterally, no wheezing or rales. No accessory muscle use CV:  Regular  rhythm,  No edema GI:  Soft, mild generalized tenderness, no guarding Neurologic:  Alert and awake, normal speech, Psych:   Not anxious nor agitated Skin:  No rashes. No jaundice Reviewed most current lab test results and cultures  YES Reviewed most current radiology test results   YES Review and summation of old records today    NO Reviewed patient's current orders and MAR    YES 
PMH/SH reviewed - no change compared to H&P Current Facility-Administered Medications:  
  0.9% sodium chloride infusion, 100 mL/hr, IntraVENous, CONTINUOUS, Wero Comer MD, Last Rate: 100 mL/hr at 02/04/20 0938, 100 mL/hr at 02/04/20 4191   heparin (porcine) injection 5,000 Units, 5,000 Units, SubCUTAneous, Q8H, Bekah Mayer MD, 5,000 Units at 02/04/20 2059   balsam peru-castor oil (VENELEX) ointment, , Topical, TID, Magalys Weems MD 
  vancomycin [facility]) 50 mg/mL oral solution 500 mg, 500 mg, Oral, Q6H, Ileana No MD, 500 mg at 02/04/20 8392 
  amiodarone (CORDARONE) tablet 200 mg, 200 mg, Oral, TID, Choco Mejia MD, 200 mg at 02/04/20 1501   metoprolol tartrate (LOPRESSOR) tablet 12.5 mg, 12.5 mg, Oral, BID, Siena Carrington MD, 12.5 mg at 02/04/20 9032 
  zinc oxide-cod liver oil (DESITIN) 40 % paste, , Topical, DAILY PRN, Radha Park MD 
  metoprolol (LOPRESSOR) injection 2.5 mg, 2.5 mg, IntraVENous, Q4H PRN, Choco Mejia MD, 2.5 mg at 02/01/20 1134 
  metroNIDAZOLE (FLAGYL) IVPB premix 500 mg, 500 mg, IntraVENous, Q8H, Ileana No MD, Last Rate: 100 mL/hr at 02/04/20 0553, 500 mg at 02/04/20 0810   aspirin chewable tablet 81 mg, 81 mg, Oral, DAILY, Valentino Mayers, MD, 81 mg at 02/04/20 3929 
  melatonin tablet 4.5 mg, 4.5 mg, Oral, QHS PRN, Valentino Mayers, MD 
  pravastatin (PRAVACHOL) tablet 20 mg, 20 mg, Oral, QHS, Valentino Mayers, MD, 20 mg at 02/03/20 2214   acetaminophen (TYLENOL) tablet 650 mg, 650 mg, Oral, Q4H PRN, Valentino Mayers, MD 
  ondansetron [facility]) injection 4 mg, 4 mg, IntraVENous, Q8H PRN, Zeny Eubanks MD, 4 mg at 02/01/20 0132 
________________________________________________________________________ Care Plan discussed with: 
  Comments Patient y Family RN y   
Care Manager Consultant Multidiciplinary team rounds were held today with , nursing, pharmacist and clinical coordinator. Patient's plan of care was discussed; medications were reviewed and discharge planning was addressed. ________________________________________________________________________ Total NON critical care TIME:  35  Minutes Total CRITICAL CARE TIME Spent:   Minutes non procedure based Comments >50% of visit spent in counseling and coordination of care    
________________________________________________________________________ Marcelina Rey MD  
 
Procedures: see electronic medical records for all procedures/Xrays and details which were not copied into this note but were reviewed prior to creation of Plan. LABS: 
I reviewed today's most current labs and imaging studies. Pertinent labs include: 
Recent Labs 02/04/20 
9894 02/03/20 
6945 02/02/20 
7399 WBC 22.9* 29.2* 30.8* HGB 12.6 13.1 12.6 HCT 36.9 40.0 37.1  308 307 Recent Labs 02/04/20 
1030 02/03/20 
6604 02/02/20 
4403 * 131* 131*  
K 3.6 3.7 3.6  104 102 CO2 16* 17* 19* * 190* 169* BUN 31* 30* 22* CREA 1.45* 1.50* 1.04* CA 7.3* 7.6* 7.2*  
MG 2.2  --  2.2 PHOS 2.4*  --  1.7* ALB  --   --  1.5* TBILI  --   --  0.5 SGOT  --   --  13* ALT  --   --  7* Signed: Kevan Lance MD

## 2020-02-04 NOTE — HOSPICE
[facility] 
[phone] Inpatient Nursing PRE Admission Patient Name: Anderson Dunaway YOB: 1924 Age: 80 y.o. Date of PLANNED Hospice Admission: 2020 Hospice Attending:  
Primary Care Physician: Tino Green NP Home Hospice Zip [id number] Expected :  [facility] ADVANCE CARE PLANNING Code Status: DDNR Durable DNR: [x]  Yes  []  No 
Advance Care Planning 1/10/2020 Patient's Healthcare Decision Maker is: Legal Next of Kin Confirm Advance Directive None Patient Would Like to Complete Advance Directive No  
 
 
Sabianism: Mellisa Johnson  Home: TBD HOSPICE SUMMARY  
ER Visits/ Hospitalizations in past year: 4 Hospice Diagnosis: protein calorie malnutrition Onset Date of Hospice Diagnosis: 2020 Summary of Disease Progression Leading to Hospice Diagnosis: 
 Andrzej Holloway NP's Palliative Note:  Anderson Dunaway is a 80 y. o. with a past history of HTN, osteoporosis and a-fib, who was admitted on 2020 from home with a diagnosis c-diff colitis with severe sepsis, a-fib with RVR, diarrhea, and abdominal pain. Patient lives with her son Dudley Hobson who has some mental deficiencies, but her daughters Ross Miller and Funmilayo Kenney are very involved in her care. Marlon Ronquillo has had a dramatic decline since October, but prior to that was completely independent. She was able to recover some of her strength at rehab this last month, but over the last week lost all of her strength from persistent diarrhea. Patient has experienced dramatic decline over the last week- she is now bedridden and  debilitated from her extreme dehydration from diarrhea x 7 days and her already frail state. Co-Morbidities:  
Patient Active Problem List  
Diagnosis Code  Hypercholesteremia E78.00  
 Encounter for long-term (current) use of other medications Z79.899  
 History of appendectomy Z90.49  Essential hypertension I10  
  Chronic back pain M54.9, G89.29  
 Macular degeneration H35.30  At risk for osteoporosis/osteopenia Z91.89  
 Arthritis of both knees M17.0  PNA (pneumonia) J18.9  Hypertensive urgency I16.0  A-fib (HCC) I48.91  
 Inability to walk R26.2  Sepsis ([facility] [address] A41.9 Diagnoses RELATED to the terminal prognosis: protein calorie malnutrition, hypoalbuminemia, c-diff colitis with severe sepsis Other Diagnoses: see above Rationale for a prognosis of life expectancy of 6 months or less if the disease follows its normal course (Disease Specific History):  
 
Drew Julien is a 80 y. o. who was admitted to [facility]. The patient's principle diagnosis of protein-calorie malnutrition has resulted in rapid decline in functional status. Functionally, the patient's Palliative Performance Scale has declined over a period of one week and is estimated at 30. Objective information that support this patients limited prognosis includes: bedridden, poor oral intake, albumin 1.5 . The patient/family chose comfort measures with the support of Hospice. Verbal certification of terminal diagnosis with life expectancy of 6 months or less received from: to be received tomorrow by admission nurse Prognosis estimated based on 02/04/20 clinical assessment is:  
[] Few to Many Hours [] Hours to Days  
[] Few to Many Days [x] Days to Weeks  
[] Few to Many Weeks  
[] Weeks to Months  
[] Few to Many Months CLINICAL INFORMATION Allergies: No Known Allergies Wt Readings from Last 3 Encounters:  
01/31/20 56.7 kg (125 lb) 01/09/20 54.4 kg (120 lb) 11/08/19 58.7 kg (129 lb 8 oz) Ht Readings from Last 3 Encounters:  
01/31/20 5' 3\" (1.6 m)  
01/09/20 5' 3\" (1.6 m)  
11/08/19 5' 4\" (1.626 m) Body mass index is 22.14 kg/m². Visit Vitals /86 Pulse 70 Temp 97.8 °F (36.6 °C) Resp 19 Ht 5' 3\" (1.6 m) Wt 56.7 kg (125 lb) SpO2 97% BMI 22.14 kg/m² LAB VALUES 
   
 CBC W/O DIFF Collection Time: 02/04/20  3:31 AM  
Result Value Ref Range WBC 22.9 (H) 3.6 - 11.0 K/uL  
 RBC 4.51 3.80 - 5.20 M/uL  
 HGB 12.6 11.5 - 16.0 g/dL HCT 36.9 35.0 - 47.0 % MCV 81.8 80.0 - 99.0 FL  
 MCH 27.9 26.0 - 34.0 PG  
 MCHC 34.1 30.0 - 36.5 g/dL  
 RDW 16.0 (H) 11.5 - 14.5 % PLATELET 317 199 - 739 K/uL MPV 10.1 8.9 - 12.9 FL  
 NRBC 0.1 (H) 0  WBC ABSOLUTE NRBC 0.02 (H) 0.00 - 0.01 K/uL No results found for this visit on 01/31/20 (from the past 6 hour(s)). Lab Results Component Value Date/Time Protein, total 4.7 (L) 02/02/2020 04:15 AM  
 Albumin 1.5 (L) 02/02/2020 04:15 AM  
 
 
Currently this patient has: 
[] Supplemental O2 [x] Peripheral IV  [] PICC    [] PORT [x] Catherine Catheter [] NG Tube   [] PEG Tube [] Ostomy   
[] AICD: Has ICD been deactivated? [] Yes [] No:______ Progression to DEPENDENCE WITH ADLs (include time frame): Dependent in all ADL's due to debility - Dependent for bathing: personal hygiene and grooming - Dependent for dressing: dressing and undressing - Dependent for transferring: movement and mobility - Dependent for toileting: continence-related tasks including control and hygiene - Dependent for eating: preparing food and feeding ASSESSMENT & PLAN 1. Symptom Issues Identified: diarrhea, debility 2. Spiritual Issues Identified: TBD, none identified at this time 3. Psych/ Social/ Emotional Issues Identified: Patient's son who has mental disabilities lives with patient. Daughter Cristian Clinton lives nearby. Other daughter Augusto Terrell lives in [place]. Both daughters are involved in patient's care and are very supportive. 4.  Care Coordination:  
Transfer to: home Report given to: hospice intake Transportation by: [facility] Scheduled for 13:30 Medications Needs: Symptom kit (morphine and ativan) ordered through [employer] [facility], to be delivered to patient's room tomorrow prior to discharge DME: hospital bed, overbed table, oxygen, bedside commode ordered from [facility]. DME be delivered tomorrow between 10am and 12noon. (Patient's son has a cardiology appt. Tomorrow morning at 8am and daughter Janiya Saenz requests that DME not be delivered before 10am.  This nurse called [facility] and confirmed with Bell Morris that DME can be delivered between 10am and 12noon tomorrow morning.) Supplies: TBD Pt to be discharged with Catherine cath left in. 
 
ISAEL AlmendarezN, RN-BC Hospice Nurse Liaison [address] [facility] [facility] Mobile:  [phone] Office: [phone]

## 2020-02-04 NOTE — PROGRESS NOTES
Bedside and Verbal shift change report given to Pierce (oncoming nurse) by Papi Patiño (offgoing nurse). Report included the following information SBAR, Kardex, Procedure Summary, Intake/Output, MAR and Recent Results. 1035: Physical therapy at bedside to work with patient 
 
1200: Patient had small formed bowel movement, linens changed and patient repositioned in the bed. Family at bedside. 1425: Hospice liaison nurse at bedside to talk with patient and patient's family. Hospice set up for tomorrow afternoon. 1600: Morning lab orders discontinued per MD, patient to keep james cathter for transition to hospice care tomorrow

## 2020-02-05 NOTE — ADT AUTH CERT NOTES
Patient Demographics Patient Name Gloria Watts , [address] [place] [address] 
[id number] Sex Female  
7/3/1924 Address [facility] [address] Phone [phone] (Home) CSN:  
[id number] Admit Date: Admit Time Room Bed 2020  9:25 AM 2241 [67572] 01 [[id number]] Attending Providers Provider Pager From To Radha Cueto MD  20 Jovani [place] MD  20 Jf Rose MD  20 Emergency Contact(s) Name Relation Home Work Mobile Beny Mejía Daughter [phone] Oris Head Daughter [phone] Colan Re [phone] Utilization Reviews  
 
   
.  CLINICAL by Bree Spencer RN  
 
   
Review Entered Review Status 2020 13:14 In Primary  
   
Criteria Review  
2020 
  
Component     Value   Flag     Ref Range       Units    Status GDH ANTIGEN           POSITIVE       Abnormal         NEG                 Final 
C. difficile toxin            POSITIVE       Abnormal         NEG                 Final 
INTERPRETATION Abnormal                                    NTXCD                         Final 
POSITIVE FOR TOXIGENIC C. DIFFICILE  
  
  
  
  
97.8  70  18  136/69  96%   
  
WBC 30.8, , CO2 19, , BUN 22, CR 1.04, CA 7.2, PHOS 1.7, ALB 1.5, SGOT 13, ALT 7 
  
  
IM: 
Reports diarrhea along with mild abdominal pain. No nausea or vomiting. HR is better Still feels weak Family at bed side and discussed plan  
  
C. difficile colitis Severe sepsis due to above resolved 
-C. difficile came back positive 
-Increase vancomycin to 500 mg 4 times daily. Continue IV Flagyl. -CT shows severe colitis  
-restart iv fluids as she is dry.  
-She is at high risk of decompensation 
-Hospice following 
  
Atrial fibrillation with rapid ventricular rate likely triggered by sepsis -now in NSR 
-Changed to po amiodarone and metoprolol 
-cards following -Not a good candidate for anticoagulation due to high risk of bleeding 
  
Hypokalemia Hypophosphatemia Hypomagnesemia 
-Phos replaced and recheck in am  
  
  
Dementia 
-Continue supportive care 
  
  
Body mass index is 22.14 kg/m².: 18.5 - 24.9 Normal weight Code Status: DNR/DN Surrogate Decision Maker: Children 
  
DVT Prophylaxis: SCDs GI Prophylaxis: not indicated 
  
  
Body mass index is 22.14 kg/m².  
   
VS by Bree Spencer RN  
 
   
Review Entered Review Status 2/5/2020 13:14 In Primary  
   
Criteria Review

## 2020-02-05 NOTE — PROGRESS NOTES
JASEN PLAN--Home with [address]. Patient will be discharging today home with [address]. Spoke with liason to make sure equipment delivered. PCS completed and given to nursing with medicals. Referral sent to [facility] and will await their response. Second medicare im letter reviewed with daughter and verbal consent placed on chart. Samanta Gordillo RN BSN CRM  [phone]

## 2020-02-05 NOTE — PROGRESS NOTES
Bedside and Verbal shift change report given to Roanne Barthel, RN (oncoming nurse) by Michelle Kelly RN (offgoing nurse). Report included the following information SBAR, Kardex, MAR, Recent Results and Cardiac Rhythm NSR. 
 
0800:  Pt resting in bed. No signs of distress. Daughter at bedside and assisted pt with breakfast. Plan today is to discharge with Hospice. Per daughter pt going home with james on hospice. 1315:  Tech discontinued IT and disconnect pt from monitors. 1320:  RN went over discharge instructions with pt daughter. Per MD hospice is taking care of any Rx. Pt clean up from small BM. No signs of distress. 1340:  Pt out on stretcher with [facility]. All belongings with pt.

## 2020-02-05 NOTE — PROGRESS NOTES
JASEN PLAN--Home with family and [employer]. [facility] can transport patient at 1400pm today. Informed patient and her daughter. Informed Dr Chauncey Levine and he will write the discharge order. Address verified. Nursing informed of transport time. Spoke with daughter who is in the room with the patient and she states the company is there now setting up equipment. Samanta Gordillo RN BSN CRM  [phone]

## 2020-02-05 NOTE — PROGRESS NOTES
Problem: Risk for Spread of Infection Goal: Prevent transmission of infectious organism to others Description Prevent the transmission of infectious organisms to other patients, staff members, and visitors. Outcome: Progressing Towards Goal 
  
Problem: Patient Education:  Go to Education Activity Goal: Patient/Family Education Outcome: Progressing Towards Goal 
  
Problem: Falls - Risk of 
Goal: *Absence of Falls Description Document Vero Maxwellmiriam Fall Risk and appropriate interventions in the flowsheet. Outcome: Progressing Towards Goal 
Note: Fall Risk Interventions: 
Mobility Interventions: Assess mobility with egress test 
 
Mentation Interventions: Adequate sleep, hydration, pain control Medication Interventions: Bed/chair exit alarm Elimination Interventions: Bed/chair exit alarm

## 2020-02-05 NOTE — ADT AUTH CERT NOTES
Patient Demographics Patient Name Gloria Watts [address] 
[id number] Sex Female  
7/3/1924 Address [address] Phone [phone] (Home) CSN:  
[id number] Admit Date: Admit Time Room Bed 2020  9:25 AM 2241 [[id number]] 01 [[id number]] Attending Providers Provider Pager From To Cheryl Alvarenga MD  20 Marlo Fothergill, MD  20 Raven Botello MD  20 Emergency Contact(s) Name Relation Home Work Mobile Debbie Pinedo Daughter [phone] Sindhu Arias Daughter [phone] Ervin Silva [phone] Utilization Reviews  
 
   
Sepsis and Other Febrile Illness, without Focal Infection - Care Day 5 (2020) by Bree Spencer RN  
 
   
Review Entered Review Status 2020 15:14 Completed  
   
Criteria Review Care Day: 5 Care Date: 2020 Level of Care: Step Down Guideline Day 2 Level Of Care (X) ICU or floor Clinical Status ( ) * Hemodynamic stability 2020 15:14:54 EST by Bree Spencer   
  97.8  79  18  119/58  94%   
(X) * Hypoxemia absent   
(X) * Tachypnea absent Routes (X) Possible IV fluids 2020 15:14:54 EST by Bree Spencer   
   ML HR CONT IV (X) Parenteral or oral medications Medications (X) Possible antimicrobial treatment 2020 15:14:54 EST by Bree Spencer   
  FLAGYL 500 MG IV EVERY 8 HOURS , VANC 500 MG PO EVERY 6 HOURS   
2020 15:14:54 EST by Bree Spencer Subject: Additional Clinical Information IM:   
  
C. difficile colitis Severe sepsis due to above resolved   
-Continue vancomycin   500 mg 4 times daily.   Continue IV Flagyl. -CT shows severe colitis   
-Continue IV fluids   
-WBC is starting to downtrend   
-Hospice consulted per family request and will follow up on discharge   
     
Atrial fibrillation with rapid ventricular rate likely triggered by sepsis -now in NSR   
-Changed to po amiodarone and metoprolol   
-cards following   
-Not a good candidate for anticoagulation due to high risk of bleeding   
     
Acute kidney injury likely prerenal   
-Baseline creatinine is around 1.7 and creatinine is 1.5   
-Urine culture is normal   
-Continue IV fluids.   Repeat BMP in a.m.   
     
Hypokalemia Hypophosphatemia Hypomagnesemia   
-Check electrolytes in a.m.   
     
     
Dementia   
-Continue supportive care   
     
Deep tissue injury to left heel   
-Continue wound care   
     
Disposition:   
Possible discharge on 2/5 if diarrhea is improved and white count is trending down Decreased urine output, increased IVF, monitor renal function CBC tomorrow am   
     
     
Body mass index is 22.14 kg/m ².:  18.5 - 24.9 Normal weight Code Status:  DNR/DN   
    
Surrogate Decision Maker:  Children   
     
DVT Prophylaxis:  SCDs GI Prophylaxis: not indicated      
     
Body mass index is 22.14 kg/m ².   
     
  
  
2/4/2020 15:14:54 EST by Bree Spencer Subject: Additional Clinical Information WBC 22.9, , CO2 16, , BUN 31, CR 1.45, CA 7.3, PHOS 2.4 * Milestone  
   
Sepsis and Other Febrile Illness, without Focal Infection - Care Day 2 (2/1/2020) by Bree Spencer RN  
 
   
Review Entered Review Status 2/3/2020 12:08 Completed  
   
Criteria Review Care Day: 2 Care Date: 2/1/2020 Level of Care: Step Down Guideline Day 2 Level Of Care (X) ICU or floor 2/3/2020 12:08:07 EST by Bree Spencer   
  STEPDOWN Clinical Status ( ) * Hemodynamic stability 2/3/2020 12:08:07 EST by Bree Spencer   
  97.1  140  18  104/72  96%   
(X) * Hypoxemia absent   
(X) * Tachypnea absent Routes (X) Parenteral or oral medications 2/3/2020 12:08:07 EST by Bree Spencer   
  LOPRESSOR 2.5 MG IV PRN X 1, ZOFRAN 4 MG IV X 1, LSIX 20 MG IV ONCE, MAG SULFATE 1 G IV ONCE , NS 1000 ML IV ONCE ,  ML IV ONCE ,  ML HR CONT IV , AMIODARONE 375 MG VONT IV , CARDIZEM TITRATE IV , LOPRESSOR 2.5 MG IV EVERY 4 HOURS Medications (X) Possible antimicrobial treatment 2/3/2020 12:08:07 EST by Bree Spencer   
  VANC 250 MG PO EVERY 6 HOURS , FLAGYL 500 MG IV EVERY 8 HOURS   
2/3/2020 12:08:07 EST by Bree Spencer Subject: Additional Clinical Information * CARDIOLOGY:   
Agree with  a  rate control strategy.   For now IV diltiazem, I added metoprolol 2.5 IV q6h.   Discontinued lisinopril to make room for the metoprolol which may be increased if her BP allows this.   Eventually to orals. * Avoid digoxin given her advanced age. * Continue amiodarone longterm.   I think rhythm control via  Afib suppression is tim for her. * Discussed anticoagulation options with her and her family.   I would not push for full dosing  in her given the overall falls/bleeding risk, but reduced dose anticoagulation instead of aspirin could be considered  if desired.    I am leaving the current strategy in place for now. * Agree with repleting the potassium.   This will help out *      
  
Suspect it will take a day to get enough BP reserve to consistently get metoprolol atop her diltiazem for better rate control.    All questions answered for the patient and most importantly the family. 2/1: Marginal BPs with occ high HR with afib. WBC 27; Hg 12; Na 134 from 132; K 3.2; Cr 0.7. LA 2 from 2.5. Remains on asa; metoprolol 2.5 IV q6; dilt gtt @ 15; amio gtt @ 0.5. Remains off lisinopril.   
     
Controlling HR will be difficult with sepsis/C diff; wean dilt gtt as able: increase standing IV metoprolol with PRNs. 2/3/2020 12:08:07 EST by Julio Arredondo Subject: Additional Clinical Information CDIFF +   
  
2/3/2020 12:08:07 EST by Bree Spencer Subject: Additional Clinical Information WBC 26.9, , K 3.2, CO2 19, , CA 6.7, PHOS 1.7   
  
2/3/2020 12:08:07 EST by Bree Spencer Subject: Additional Clinical Information IM:   
  
C. difficile colitis Severe sepsis due to above   
-C. difficile came back positive   
-Increase vancomycin to 250 mg 4 times daily.   Continue IV Flagyl.   
-Check CT of abdomen to rule out pancolitis   
-Continue IV fluids.   Repeat CBC in a.m.   Leukocytosis is worsening.   
-She is at high risk of decompensation   
-Hospice consulted per family request   
     
Atrial fibrillation with rapid ventricular rate likely triggered by sepsis   
-Continue amiodarone drip.  Continue Cardizem GTT.  Continue IV metoprolol.   
-Cardiology is following   
-Closely monitor blood pressure due to borderline hypotension   
-Not a good candidate for anticoagulation due to high risk of bleeding   
     
Hypokalemia Hypophosphatemia Hypomagnesemia   
-Electrolytes replaced.   Recheck in a.m.   
     
     
Dementia   
-Continue supportive care      
     
Body mass index is 22.14 kg/m ².:  18.5 - 24.9 Normal weight Code Status:  DNR/DN   
    
Surrogate Decision Maker:  Children   
     
DVT Prophylaxis:  SCDs GI Prophylaxis: not indicated      
     
Body mass index is 22.14 kg/m ².   
     
  
  
* Milestone

## 2020-02-05 NOTE — DISCHARGE SUMMARY
Hospitalist Discharge Summary Patient ID: 
Calin Doll 
[id number] 
56 y.o. 
7/3/1924 
1/31/2020 PCP on record: Jenna Hernandez NP Admit date: 1/31/2020 Discharge date and time: 2/5/2020 DISCHARGE DIAGNOSIS: 
 c diff colitis  
afib RVR 
CONSULTATIONS: 
IP CONSULT TO CARDIOLOGY 
IP CONSULT TO HOSPITALIST 
IP CONSULT TO PALLIATIVE CARE - PROVIDER Excerpted HPI from H&P of Manan Moctezuma MD: 
HISTORY OF PRESENT ILLNESS:    
Eren Gimenez is a 80 y.o.  female who presents with past medical history of A. fib, hypertension, dementia who was recently discharged from hospital after she was treated for pneumonia and sent to rehab. While in rehab patient developed diarrhea and was discharged home. While at home she continues to have frequent diarrhea up to 5-6 times a day. Today she complain of some chest tightness and pain radiating to her jaw. When patient was brought to the ED she was found to have tachycardia in the range of 180-200. She also had fever. [employer] send C. difficile test yesterday but there is no report and at the same time the home health company started the patient on vancomycin capsule. In the ED she was found to have elevated white blood cells 22,000. Has tachycardia. Patient started on Cardizem drip but her heart rate did not improve. ED physician contacted patient's primary cardiologist and started amiodarone in addition to Cardizem. Patient received a bolus of 2 L of crystalloid Patient to be admitted to the hospital for management of A. fib with RVR as well as suspected C. difficile diarrhea. Patient received vancomycin and cefepime but there is no other systemic infection at this point 
  
We were asked to admit for work up and evaluation of the above problems.  
  
 
______________________________________________________________________ DISCHARGE SUMMARY/HOSPITAL COURSE:  for full details see H&P, daily progress notes, labs, consult notes. C. difficile colitis Severe sepsis due to above resolved 
-Continue vancomycin  500 mg 4 times daily. Continue IV Flagyl. -CT shows severe colitis  
-Continue IV fluids 
-WBC is starting to downtrend 
-Hospice consulted per family request and will follow up on discharge 
  
Atrial fibrillation with rapid ventricular rate likely triggered by sepsis -now in NSR 
-Changed to po amiodarone and metoprolol 
-cards following 
-Not a good candidate for anticoagulation due to high risk of bleeding 
  
Acute kidney injury likely prerenal 
-Baseline creatinine is around 1.7 and creatinine is 1.5 
-Urine culture is normal 
-Continue IV fluids. Repeat BMP in a.m. 
  
Hypokalemia Hypophosphatemia Hypomagnesemia 
-Check electrolytes in a.m. 
  
  
Dementia 
-Continue supportive care 
  
Deep tissue injury to left heel 
-Continue wound care 
  
Disposition:  
Possible discharge on 2/5 if diarrhea is improved and white count is trending down Decreased urine output, increased IVF, monitor renal function CBC tomorrow am  
  
  
Body mass index is 22.14 kg/m².: 18.5 - 24.9 Normal weight Code Status: DNR/DN Surrogate Decision Maker: Children 
  
DVT Prophylaxis: SCDs GI Prophylaxis: not indicated 
  
  
Body mass index is 22.14 kg/m². 
  
  
 
 
 
 
_______________________________________________________________________ Patient seen and examined by me on discharge day. Pertinent Findings: 
Gen:    Not in distress Chest: Clear lungs CVS:   Regular rhythm. No edema Abd:  Soft, not distended, not tender Neuro:  Alert, oriented  
_______________________________________________________________________ DISCHARGE MEDICATIONS:  
Current Discharge Medication List  
  
START taking these medications Details  
vancomycin (FIRVANQ) 50 mg/mL oral solution Take 10 mL by mouth every six (6) hours for 8 days. Qty: 320 mL, Refills: 0 amiodarone (CORDARONE) 200 mg tablet Take 1 Tab by mouth three (3) times daily. Qty: 90 Tab, Refills: 0  
  
zinc oxide-cod liver oil (DESITIN) 40 % paste Apply  to affected area as needed for Skin Irritation. Qty: 1 Tube, Refills: 0  
  
ondansetron (ZOFRAN) 4 mg/2 mL soln 2 mL by IntraVENous route every eight (8) hours as needed for Nausea. Qty: 1 mL, Refills: 0  
  
metoprolol tartrate (LOPRESSOR) 25 mg tablet Take 0.5 Tabs by mouth two (2) times a day. Qty: 60 Tab, Refills: 0  
  
balsam peru-castor oil (VENELEX) ointment Apply  to affected area three (3) times daily. Qty: 1 Tube, Refills: 0 CONTINUE these medications which have NOT CHANGED Details  
melatonin 5 mg tablet Take 5 mg by mouth nightly as needed (sleep distubrance). pravastatin (PRAVACHOL) 20 mg tablet Take 1 Tab by mouth nightly for 60 days. Qty: 30 Tab, Refills: 1  
  
aspirin 81 mg chewable tablet Take 1 Tab by mouth daily. STOP taking these medications  
  
 escitalopram oxalate (LEXAPRO) 5 mg tablet Comments:  
Reason for Stopping:   
   
 vancomycin (VANCOCIN) 125 mg capsule Comments:  
Reason for Stopping:   
   
 dilTIAZem CD (CARDIZEM CD) 180 mg ER capsule Comments:  
Reason for Stopping:   
   
 lisinopril (PRINIVIL, ZESTRIL) 20 mg tablet Comments:  
Reason for Stopping:   
   
  
 
 
 
Patient Follow Up Instructions: Activity: PT/OT per Home Health Diet: Regular Diet Wound Care: None needed Follow-up with Hospice shortly after discharge Follow-up tests/labs Follow-up Information Follow up With Specialties Details Why Contact [facility]   This is your home hospice. [phone] Tino Green NP Nurse Practitioner   [address] [place] [id number] 
[phone] Joni Flannery MD Palliative Medicine   [facility] [address] [facility] [address] 
[phone] 
  
  
 
________________________________________________________________ Risk of deterioration: High 
 
Condition at Discharge:  Stable 
__________________________________________________________________ Disposition Home Hospice  
 
____________________________________________________________________ Code Status: DNR/DNI 
___________________________________________________________________ Total time in minutes spent coordinating this discharge (includes going over instructions, follow-up, prescriptions, and preparing report for sign off to her PCP) :  35 minutes Signed: 
Marcelina Rey MD

## 2020-02-21 ENCOUNTER — HOME CARE VISIT (OUTPATIENT)
Dept: HOSPICE | Facility: HOSPICE | Age: 85
End: 2020-02-21
Payer: MEDICARE